# Patient Record
Sex: MALE | Race: ASIAN | Employment: UNEMPLOYED | URBAN - METROPOLITAN AREA
[De-identification: names, ages, dates, MRNs, and addresses within clinical notes are randomized per-mention and may not be internally consistent; named-entity substitution may affect disease eponyms.]

---

## 2017-01-27 RX ORDER — CHOLECALCIFEROL (VITAMIN D3) 125 MCG
CAPSULE ORAL
Qty: 100 CAP | Refills: 0 | Status: SHIPPED | OUTPATIENT
Start: 2017-01-27 | End: 2017-02-28 | Stop reason: SDUPTHER

## 2017-02-28 DIAGNOSIS — J06.9 VIRAL URI: ICD-10-CM

## 2017-02-28 RX ORDER — MONTELUKAST SODIUM 10 MG/1
TABLET ORAL
Qty: 30 TAB | Refills: 1 | Status: SHIPPED | OUTPATIENT
Start: 2017-02-28 | End: 2017-05-10 | Stop reason: ALTCHOICE

## 2017-02-28 RX ORDER — CHOLECALCIFEROL (VITAMIN D3) 125 MCG
CAPSULE ORAL
Qty: 100 CAP | Refills: 0 | Status: SHIPPED | OUTPATIENT
Start: 2017-02-28 | End: 2017-05-25 | Stop reason: SDUPTHER

## 2017-05-10 ENCOUNTER — OFFICE VISIT (OUTPATIENT)
Dept: FAMILY MEDICINE CLINIC | Age: 67
End: 2017-05-10

## 2017-05-10 VITALS
DIASTOLIC BLOOD PRESSURE: 88 MMHG | WEIGHT: 122 LBS | HEIGHT: 64 IN | HEART RATE: 64 BPM | TEMPERATURE: 97.6 F | RESPIRATION RATE: 14 BRPM | OXYGEN SATURATION: 100 % | SYSTOLIC BLOOD PRESSURE: 136 MMHG | BODY MASS INDEX: 20.83 KG/M2

## 2017-05-10 DIAGNOSIS — R27.8 SENSORY ATAXIA: ICD-10-CM

## 2017-05-10 DIAGNOSIS — E55.9 VITAMIN D DEFICIENCY: ICD-10-CM

## 2017-05-10 DIAGNOSIS — Z23 NEED FOR PNEUMOCOCCAL VACCINATION: ICD-10-CM

## 2017-05-10 DIAGNOSIS — E78.1 HYPERTRIGLYCERIDEMIA: ICD-10-CM

## 2017-05-10 DIAGNOSIS — I10 ESSENTIAL HYPERTENSION: Primary | ICD-10-CM

## 2017-05-10 DIAGNOSIS — E78.5 HYPERLIPIDEMIA WITH TARGET LDL LESS THAN 130: ICD-10-CM

## 2017-05-10 NOTE — MR AVS SNAPSHOT
Visit Information Date & Time Provider Department Dept. Phone Encounter #  
 5/10/2017  8:00 AM Derrick Connor MD [address] [place] [address] [phone] [id number] Follow-up Instructions Return in about 4 months (around 9/10/2017) for fasting, physical.  
  
Upcoming Health Maintenance Date Due ZOSTER VACCINE AGE 60> 1/1/2012 GLAUCOMA SCREENING Q2Y 1/1/2017 Pneumococcal 65+ Low/Medium Risk (1 of 2 - PCV13) 1/1/2017 MEDICARE YEARLY EXAM 1/1/2017 INFLUENZA AGE 9 TO ADULT 8/1/2017 COLONOSCOPY 1/21/2018 DTaP/Tdap/Td series (2 - Td) 6/27/2024 Allergies as of 5/10/2017  Review Complete On: 5/10/2017 By: Derrick Connor MD  
 No Known Allergies Current Immunizations  Reviewed on 8/4/2016 Name Date Influenza Vaccine 12/10/2015 Influenza Vaccine (Quad) PF 12/10/2015 Pneumococcal Polysaccharide (PPSV-23)  Incomplete Td 6/27/2014, 7/2/2012 Tdap 6/13/2013 Not reviewed this visit You Were Diagnosed With   
  
 Codes Comments Essential hypertension    -  Primary ICD-10-CM: I10 
ICD-9-CM: 401.9 Hyperlipidemia with target LDL less than 130     ICD-10-CM: E78.5 ICD-9-CM: 272.4 Hypertriglyceridemia     ICD-10-CM: E78.1 ICD-9-CM: 272.1 Vitamin D deficiency     ICD-10-CM: E55.9 ICD-9-CM: 268.9 Sensory ataxia     ICD-10-CM: R27.8 ICD-9-CM: 781.3 Need for pneumococcal vaccination     ICD-10-CM: A13 ICD-9-CM: V03.82 Vitals BP Pulse Temp Resp Height(growth percentile) Weight(growth percentile) 136/88 (BP 1 Location: Right arm, BP Patient Position: Sitting) 64 97.6 °F (36.4 °C) (Oral) 14 5' 4\" (1.626 m) 122 lb (55.3 kg) SpO2 BMI Smoking Status 100% 20.94 kg/m2 Never Smoker Vitals History BMI and BSA Data Body Mass Index Body Surface Area  
 20.94 kg/m 2 1.58 m 2 Preferred Pharmacy Pharmacy Name Phone [facility]/PHARMACY #[facility] ROSE, [address] [facility] [phone] Your Updated Medication List  
  
   
This list is accurate as of: 5/10/17  8:35 AM.  Always use your most recent med list.  
  
  
  
  
 atenolol 25 mg tablet Commonly known as:  TENORMIN  
TAKE 1 TABLET BY MOUTH DAILY. atorvastatin 10 mg tablet Commonly known as:  LIPITOR  
TAKE 1 TABLET BY MOUTH EVERY DAY  
  
 cholecalciferol 5,000 unit capsule Commonly known as:  VITAMIN D3  
TAKE ONE CAPSULE BY MOUTH EVERY DAY  
  
 cromolyn 4 % ophthalmic solution Commonly known as:  OPTICROM Administer 1 Drop to both eyes four (4) times daily. Use in affected eye(s)  
  
 cyanocobalamin 500 mcg tablet Commonly known as:  VITAMIN B12  
TAKE 1 TAB BY MOUTH DAILY. naproxen 375 mg tablet Commonly known as:  NAPROSYN Take 1 Tab by mouth two (2) times daily (with meals). We Performed the Following LIPID PANEL [58346 CPT(R)] METABOLIC PANEL, COMPREHENSIVE [64213 CPT(R)] PNEUMOCOCCAL POLYSACCHARIDE VACCINE, 23-VALENT, ADULT OR IMMUNOSUPPRESSED PT DOSE, [92920 CPT(R)] SD IMMUNIZ ADMIN,1 SINGLE/COMB VAC/TOXOID [id number] CPT(R)] VITAMIN B12 & FOLATE [97069 CPT(R)] VITAMIN D, 25 HYDROXY [id number] CPT(R)] Follow-up Instructions Return in about 4 months (around 9/10/2017) for fasting, physical.  
  
  
Patient Instructions DASH Diet: Care Instructions Your Care Instructions The DASH diet is an eating plan that can help lower your blood pressure. DASH stands for Dietary Approaches to Stop Hypertension. Hypertension is high blood pressure. The DASH diet focuses on eating foods that are high in calcium, potassium, and magnesium. These nutrients can lower blood pressure. The foods that are highest in these nutrients are fruits, vegetables, low-fat dairy products, nuts, seeds, and legumes.  But taking calcium, potassium, and magnesium supplements instead of eating foods that are high in those nutrients does not have the same effect. The DASH diet also includes whole grains, fish, and poultry. The DASH diet is one of several lifestyle changes your doctor may recommend to lower your high blood pressure. Your doctor may also want you to decrease the amount of sodium in your diet. Lowering sodium while following the DASH diet can lower blood pressure even further than just the DASH diet alone. Follow-up care is a key part of your treatment and safety. Be sure to make and go to all appointments, and call your doctor if you are having problems. It's also a good idea to know your test results and keep a list of the medicines you take. How can you care for yourself at home? Following the DASH diet · Eat 4 to 5 servings of fruit each day. A serving is 1 medium-sized piece of fruit, ½ cup chopped or canned fruit, 1/4 cup dried fruit, or 4 ounces (½ cup) of fruit juice. Choose fruit more often than fruit juice. · Eat 4 to 5 servings of vegetables each day. A serving is 1 cup of lettuce or raw leafy vegetables, ½ cup of chopped or cooked vegetables, or 4 ounces (½ cup) of vegetable juice. Choose vegetables more often than vegetable juice. · Get 2 to 3 servings of low-fat and fat-free dairy each day. A serving is 8 ounces of milk, 1 cup of yogurt, or 1 ½ ounces of cheese. · Eat 6 to 8 servings of grains each day. A serving is 1 slice of bread, 1 ounce of dry cereal, or ½ cup of cooked rice, pasta, or cooked cereal. Try to choose whole-grain products as much as possible. · Limit lean meat, poultry, and fish to 2 servings each day. A serving is 3 ounces, about the size of a deck of cards. · Eat 4 to 5 servings of nuts, seeds, and legumes (cooked dried beans, lentils, and split peas) each week. A serving is 1/3 cup of nuts, 2 tablespoons of seeds, or ½ cup of cooked beans or peas. · Limit fats and oils to 2 to 3 servings each day. A serving is 1 teaspoon of vegetable oil or 2 tablespoons of salad dressing. · Limit sweets and added sugars to 5 servings or less a week. A serving is 1 tablespoon jelly or jam, ½ cup sorbet, or 1 cup of lemonade. · Eat less than 2,300 milligrams (mg) of sodium a day. If you limit your sodium to 1,500 mg a day, you can lower your blood pressure even more. Tips for success · Start small. Do not try to make dramatic changes to your diet all at once. You might feel that you are missing out on your favorite foods and then be more likely to not follow the plan. Make small changes, and stick with them. Once those changes become habit, add a few more changes. · Try some of the following: ¨ Make it a goal to eat a fruit or vegetable at every meal and at snacks. This will make it easy to get the recommended amount of fruits and vegetables each day. ¨ Try yogurt topped with fruit and nuts for a snack or healthy dessert. ¨ Add lettuce, tomato, cucumber, and onion to sandwiches. ¨ Combine a ready-made pizza crust with low-fat mozzarella cheese and lots of vegetable toppings. Try using tomatoes, squash, spinach, broccoli, carrots, cauliflower, and onions. ¨ Have a variety of cut-up vegetables with a low-fat dip as an appetizer instead of chips and dip. ¨ Sprinkle sunflower seeds or chopped almonds over salads. Or try adding chopped walnuts or almonds to cooked vegetables. ¨ Try some vegetarian meals using beans and peas. Add garbanzo or kidney beans to salads. Make burritos and tacos with mashed knapp beans or black beans. Where can you learn more? Go to http://bri-riley.info/. Enter C119 in the search box to learn more about \"DASH Diet: Care Instructions. \" Current as of: March 23, 2016 Content Version: 11.2 © 2797-4234 [employer], [employer].  Care instructions adapted under license by [address] (which disclaims liability or warranty for this information). If you have questions about a medical condition or this instruction, always ask your healthcare professional. [address] any warranty or liability for your use of this information. Introducing \[facility]\"" & HEALTH SERVICES! Staci Mulligan introduces [employer]iCure patient portal. Now you can access parts of your medical record, email your doctor's office, and request medication refills online. 1. In your internet browser, go to [email or web address] 2. Click on the First Time User? Click Here link in the Sign In box. You will see the New Member Sign Up page. 3. Enter your CartiCure Access Code exactly as it appears below. You will not need to use this code after youve completed the sign-up process. If you do not sign up before the expiration date, you must request a new code. · [employer] Access Code: [email or web address] Expires: 8/8/2017  8:35 AM 
 
4. Enter the last four digits of your Social Security Number (xxxx) and Date of Birth (mm/dd/yyyy) as indicated and click Submit. You will be taken to the next sign-up page. 5. Create a CartiCure ID. This will be your CartiCure login ID and cannot be changed, so think of one that is secure and easy to remember. 6. Create a Carti[employer] password. You can change your password at any time. 7. Enter your Password Reset Question and Answer. This can be used at a later time if you forget your password. 8. Enter your e-mail address. You will receive e-mail notification when new information is available in [address] 9. Click Sign Up. You can now view and download portions of your medical record. 10. Click the Download Summary menu link to download a portable copy of your medical information. If you have questions, please visit the Frequently Asked Questions section of the [employer] website.  Remember, CartiCure is NOT to be used for urgent needs. For medical emergencies, dial 911. Now available from your iPhone and Android! Please provide this summary of care documentation to your next provider. Your primary care clinician is listed as Lonnie Coleman. If you have any questions after today's visit, please call [phone].

## 2017-05-10 NOTE — PROGRESS NOTES
Chief Complaint   Patient presents with    Cholesterol Problem     Follow up, fasting     Hypertension    Immunization/Injection     Pneumovax 23       1. Have you been to the ER, urgent care clinic since your last visit? Hospitalized since your last visit? No    2. Have you seen or consulted any other health care providers outside of the [address] [place] [address] since your last visit? Include any pap smears or colon screening. No    Angelica QUINONES Gabriel Ohara  is a 74 y.o.  male  who present for Pneumovax 23 immunizations/injections. He/she denies any symptoms , reactions or allergies that would exclude them from being immunized today. Risks and adverse reactions were discussed and the VIS was given if applicable to them. All questions were addressed. He/She was observed for 10 min post injection. There were no reactions observed.     Adelina Alvarez LPN

## 2017-05-10 NOTE — PATIENT INSTRUCTIONS

## 2017-05-10 NOTE — PROGRESS NOTES
HISTORY OF PRESENT ILLNESS  Angelica Btaes is a 74 y.o. male. He was seen for 4 months follow up on HTN, dyslipidemia, sensory ataxia. Significant improvement in gait. HPI  Cardiovascular Review  The patient has hypertension and hyperlipidemia. He reports taking medications as instructed, no medication side effects noted, no TIA's, no chest pain on exertion, no dyspnea on exertion, no swelling of ankles, no orthostatic dizziness or lightheadedness, no orthopnea or paroxysmal nocturnal dyspnea, no palpitations, no intermittent claudication symptoms, no muscle aches or pain. Diet and Lifestyle: not attempting to follow a low fat, low cholesterol diet, not attempting to follow a low sodium diet, sedentary, alcohol intake 3-4 beers/day. Lab review: labs reviewed and discussed with patient. 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 He had MRI of head, Cervical spine and lumbar spine. 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 He was also referred for PT. He has negative RPR, normal homocystine and methylmalonic acid levels. He  had EMG and NCV which showed sensory neuropathy in both LE. Lab Results   Component Value Date/Time    VITAMIN D, 25-HYDROXY 56.2 12/06/2016 09:52 AM         Review of Systems   Constitutional: Negative for chills, fever and malaise/fatigue. HENT: Negative for congestion, ear pain, sore throat and tinnitus. Eyes: Negative for blurred vision, double vision, pain and discharge. Respiratory: Negative for cough, shortness of breath and wheezing.     Cardiovascular: Negative for chest pain, palpitations and leg swelling. Gastrointestinal: Negative for abdominal pain, blood in stool, constipation, diarrhea, nausea and vomiting. Genitourinary: Negative for dysuria, frequency, hematuria and urgency. Musculoskeletal: Negative for back pain, joint pain and myalgias. Skin: Negative for rash. Neurological: Negative for dizziness, tremors, seizures and headaches. Endo/Heme/Allergies: Negative for polydipsia. Does not bruise/bleed easily. Psychiatric/Behavioral: Negative for depression and substance abuse. The patient is not nervous/anxious. Physical Exam   Constitutional: He is oriented to person, place, and time. He appears well-developed and well-nourished. HENT:   Head: Normocephalic and atraumatic. Nose: Nose normal.   Mouth/Throat: Oropharynx is clear and moist.   Neck: Normal range of motion. Neck supple. No JVD present. Cardiovascular: Normal rate, regular rhythm, normal heart sounds and intact distal pulses. Exam reveals no gallop and no friction rub. No murmur heard. Pulmonary/Chest: Effort normal and breath sounds normal. He has no wheezes. Abdominal: Soft. Bowel sounds are normal.   Musculoskeletal: Normal range of motion. He exhibits no edema or tenderness. Neurological: He is alert and oriented to person, place, and time. He displays abnormal reflex. No cranial nerve deficit. Coordination normal.   Reflex Scores:       Tricep reflexes are 2+ on the right side and 2+ on the left side. Bicep reflexes are 2+ on the right side and 2+ on the left side. Brachioradialis reflexes are 2+ on the right side and 2+ on the left side. Patellar reflexes are 3+ on the right side and 3+ on the left side. Achilles reflexes are 3+ on the right side and 3+ on the left side. Decreased sensations on both lower extremities. Exaggerated knee reflex on left side. He is using cane to support when he walks. Gait has been significantly improved consistently. Skin: Skin is warm and dry. No rash noted. Psychiatric: He has a normal mood and affect. Nursing note and vitals reviewed. ASSESSMENT and PLAN  Mon was seen today for cholesterol problem, hypertension and immunization/injection. Diagnoses and all orders for this visit:    Essential hypertension  -     METABOLIC PANEL, COMPREHENSIVE  -     LIPID PANEL    Hyperlipidemia with target LDL less than 144  -     METABOLIC PANEL, COMPREHENSIVE  -     LIPID PANEL    Hypertriglyceridemia  -     METABOLIC PANEL, COMPREHENSIVE  -     LIPID PANEL    Vitamin D deficiency  -     VITAMIN D, 25 HYDROXY    Sensory ataxia  -     VITAMIN B12 & FOLATE    Need for pneumococcal vaccination  -     PNEUMOCOCCAL POLYSACCHARIDE VACCINE, 23-VALENT, ADULT OR IMMUNOSUPPRESSED PT DOSE,  -     [place] IMMUNIZ ADMIN,1 SINGLE/COMB VAC/TOXOID    Discussed lifestyle issues and health guidance given  Patient was given an after visit summary which includes diagnoses, vital signs, current medications, instructions and references & authorized prescriptions . Results of labs will be conveyed to patient, once available. Pt verbalized instructions I provided and expressed understanding of discussion that was held today.   Follow-up Disposition:  Return in about 4 months (around 9/10/2017) for fasting, physical.

## 2017-05-11 LAB
25(OH)D3+25(OH)D2 SERPL-MCNC: 66.7 NG/ML (ref 30–100)
ALBUMIN SERPL-MCNC: 4.5 G/DL (ref 3.6–4.8)
ALBUMIN/GLOB SERPL: 1.6 {RATIO} (ref 1.2–2.2)
ALP SERPL-CCNC: 74 IU/L (ref 39–117)
ALT SERPL-CCNC: 34 IU/L (ref 0–44)
AST SERPL-CCNC: 28 IU/L (ref 0–40)
BILIRUB SERPL-MCNC: 1.1 MG/DL (ref 0–1.2)
BUN SERPL-MCNC: 6 MG/DL (ref 8–27)
BUN/CREAT SERPL: 6 (ref 10–24)
CALCIUM SERPL-MCNC: 9.6 MG/DL (ref 8.6–10.2)
CHLORIDE SERPL-SCNC: 101 MMOL/L (ref 96–106)
CHOLEST SERPL-MCNC: 119 MG/DL (ref 100–199)
CO2 SERPL-SCNC: 24 MMOL/L (ref 18–29)
CREAT SERPL-MCNC: 0.98 MG/DL (ref 0.76–1.27)
FOLATE SERPL-MCNC: 9 NG/ML
GLOBULIN SER CALC-MCNC: 2.8 G/DL (ref 1.5–4.5)
GLUCOSE SERPL-MCNC: 94 MG/DL (ref 65–99)
HDLC SERPL-MCNC: 40 MG/DL
INTERPRETATION, 910389: NORMAL
LDLC SERPL CALC-MCNC: 25 MG/DL (ref 0–99)
POTASSIUM SERPL-SCNC: 4.2 MMOL/L (ref 3.5–5.2)
PROT SERPL-MCNC: 7.3 G/DL (ref 6–8.5)
SODIUM SERPL-SCNC: 141 MMOL/L (ref 134–144)
TRIGL SERPL-MCNC: 271 MG/DL (ref 0–149)
VIT B12 SERPL-MCNC: 607 PG/ML (ref 211–946)
VLDLC SERPL CALC-MCNC: 54 MG/DL (ref 5–40)

## 2017-05-18 ENCOUNTER — HOSPITAL ENCOUNTER (EMERGENCY)
Age: 67
Discharge: HOME | End: 2017-05-18
Attending: EMERGENCY MEDICINE
Payer: MEDICAID

## 2017-05-18 ENCOUNTER — APPOINTMENT (OUTPATIENT)
Dept: CT IMAGING | Age: 67
End: 2017-05-18
Attending: EMERGENCY MEDICINE
Payer: MEDICAID

## 2017-05-18 VITALS
SYSTOLIC BLOOD PRESSURE: 153 MMHG | RESPIRATION RATE: 22 BRPM | HEIGHT: 64 IN | BODY MASS INDEX: 19.72 KG/M2 | DIASTOLIC BLOOD PRESSURE: 76 MMHG | HEART RATE: 64 BPM | WEIGHT: 115.5 LBS | OXYGEN SATURATION: 99 %

## 2017-05-18 DIAGNOSIS — F10.920 ALCOHOL INTOXICATION, UNCOMPLICATED (HCC): ICD-10-CM

## 2017-05-18 DIAGNOSIS — S20.211A CONTUSION OF CHEST WALL, RIGHT, INITIAL ENCOUNTER: ICD-10-CM
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
## 2017-05-18 LAB
ALBUMIN SERPL BCP-MCNC: 3.9 G/DL (ref 3.5–5)
ALBUMIN/GLOB SERPL: 0.9 {RATIO} (ref 1.1–2.2)
ALP SERPL-CCNC: 79 U/L (ref 45–117)
ALT SERPL-CCNC: 49 U/L (ref 12–78)
ANION GAP BLD CALC-SCNC: 10 MMOL/L (ref 5–15)
APTT PPP: 27.3 SEC (ref 22.1–32.5)
AST SERPL W P-5'-P-CCNC: 41 U/L (ref 15–37)
ATRIAL RATE: 54 BPM
BASOPHILS # BLD AUTO: 0 K/UL (ref 0–0.1)
BASOPHILS # BLD: 0 % (ref 0–1)
BILIRUB SERPL-MCNC: 0.4 MG/DL (ref 0.2–1)
BUN SERPL-MCNC: 10 MG/DL (ref 6–20)
BUN/CREAT SERPL: 9 (ref 12–20)
CALCIUM SERPL-MCNC: 9.9 MG/DL (ref 8.5–10.1)
CALCULATED P AXIS, ECG09: 61 DEGREES
CALCULATED R AXIS, ECG10: 42 DEGREES
CALCULATED T AXIS, ECG11: 18 DEGREES
CHLORIDE SERPL-SCNC: 107 MMOL/L (ref 97–108)
CO2 SERPL-SCNC: 26 MMOL/L (ref 21–32)
CREAT SERPL-MCNC: 1.09 MG/DL (ref 0.7–1.3)
DIAGNOSIS, 93000: NORMAL
EOSINOPHIL # BLD: 0.1 K/UL (ref 0–0.4)
EOSINOPHIL NFR BLD: 3 % (ref 0–7)
ERYTHROCYTE [DISTWIDTH] IN BLOOD BY AUTOMATED COUNT: 13.1 % (ref 11.5–14.5)
ETHANOL SERPL-MCNC: 218 MG/DL
GLOBULIN SER CALC-MCNC: 4.3 G/DL (ref 2–4)
GLUCOSE SERPL-MCNC: 112 MG/DL (ref 65–100)
HCT VFR BLD AUTO: 43 % (ref 36.6–50.3)
HGB BLD-MCNC: 15 G/DL (ref 12.1–17)
INR PPP: 0.9 (ref 0.9–1.1)
LYMPHOCYTES # BLD AUTO: 46 % (ref 12–49)
LYMPHOCYTES # BLD: 2.1 K/UL (ref 0.8–3.5)
MCH RBC QN AUTO: 29.6 PG (ref 26–34)
MCHC RBC AUTO-ENTMCNC: 34.9 G/DL (ref 30–36.5)
MCV RBC AUTO: 85 FL (ref 80–99)
MONOCYTES # BLD: 0.3 K/UL (ref 0–1)
MONOCYTES NFR BLD AUTO: 7 % (ref 5–13)
NEUTS SEG # BLD: 2 K/UL (ref 1.8–8)
NEUTS SEG NFR BLD AUTO: 44 % (ref 32–75)
P-R INTERVAL, ECG05: 154 MS
PLATELET # BLD AUTO: 170 K/UL (ref 150–400)
POTASSIUM SERPL-SCNC: 3.3 MMOL/L (ref 3.5–5.1)
PROT SERPL-MCNC: 8.2 G/DL (ref 6.4–8.2)
PROTHROMBIN TIME: 9.6 SEC (ref 9–11.1)
Q-T INTERVAL, ECG07: 414 MS
QRS DURATION, ECG06: 90 MS
QTC CALCULATION (BEZET), ECG08: 392 MS
RBC # BLD AUTO: 5.06 M/UL (ref 4.1–5.7)
SODIUM SERPL-SCNC: 143 MMOL/L (ref 136–145)
THERAPEUTIC RANGE,PTTT: NORMAL SECS (ref 58–77)
TROPONIN I SERPL-MCNC: <0.04 NG/ML
VENTRICULAR RATE, ECG03: 54 BPM
WBC # BLD AUTO: 4.5 K/UL (ref 4.1–11.1)

## 2017-05-18 PROCEDURE — 80053 COMPREHEN METABOLIC PANEL: CPT | Performed by: EMERGENCY MEDICINE

## 2017-05-18 PROCEDURE — 80307 DRUG TEST PRSMV CHEM ANLYZR: CPT | Performed by: EMERGENCY MEDICINE

## 2017-05-18 PROCEDURE — 85610 PROTHROMBIN TIME: CPT | Performed by: EMERGENCY MEDICINE

## 2017-05-18 PROCEDURE — 71250 CT THORAX DX C-: CPT

## 2017-05-18 PROCEDURE — 96374 THER/PROPH/DIAG INJ IV PUSH: CPT

## 2017-05-18 PROCEDURE — 93005 ELECTROCARDIOGRAM TRACING: CPT

## 2017-05-18 PROCEDURE — 84484 ASSAY OF TROPONIN QUANT: CPT | Performed by: EMERGENCY MEDICINE

## 2017-05-18 PROCEDURE — 85025 COMPLETE CBC W/AUTO DIFF WBC: CPT | Performed by: EMERGENCY MEDICINE

## 2017-05-18 PROCEDURE — 70450 CT HEAD/BRAIN W/O DYE: CPT

## 2017-05-18 PROCEDURE — 99284 EMERGENCY DEPT VISIT MOD MDM: CPT

## 2017-05-18 PROCEDURE — 74011250636 HC RX REV CODE- 250/636: Performed by: EMERGENCY MEDICINE

## 2017-05-18 PROCEDURE — 85730 THROMBOPLASTIN TIME PARTIAL: CPT | Performed by: EMERGENCY MEDICINE

## 2017-05-18 PROCEDURE — 96361 HYDRATE IV INFUSION ADD-ON: CPT

## 2017-05-18 PROCEDURE — 36415 COLL VENOUS BLD VENIPUNCTURE: CPT | Performed by: EMERGENCY MEDICINE

## 2017-05-18 RX ORDER — KETOROLAC TROMETHAMINE 30 MG/ML
15 INJECTION, SOLUTION INTRAMUSCULAR; INTRAVENOUS
Status: COMPLETED | OUTPATIENT
Start: 2017-05-18 | End: 2017-05-18

## 2017-05-18 RX ADMIN — KETOROLAC TROMETHAMINE 15 MG: 30 INJECTION, SOLUTION INTRAMUSCULAR at 02:16

## 2017-05-18 RX ADMIN — SODIUM CHLORIDE 500 ML: 900 INJECTION, SOLUTION INTRAVENOUS at 02:15

## 2017-05-18 NOTE — DISCHARGE INSTRUCTIONS
Acute Alcohol Intoxication: Care Instructions  Your Care Instructions  You have had treatment to help your body rid itself of alcohol. Too much alcohol upsets the body's fluid balance. Your doctor may have given you fluids and vitamins. For some people, drinking too much alcohol is a one-time event. For others, it is an ongoing problem. In either case, it is serious. It can be life-threatening. Follow-up care is a key part of your treatment and safety. Be sure to make and go to all appointments, and call your doctor if you are having problems. It's also a good idea to know your test results and keep a list of the medicines you take. How can you care for yourself at home? · Be safe with medicines. Take your medicines exactly as prescribed. Call your doctor if you think you are having a problem with your medicine. · Your doctor may have prescribed disulfiram (Antabuse). Do not drink any alcohol while you are taking this medicine. You may have severe or even life-threatening side effects from even small amounts of alcohol. · If you were given medicine to prevent nausea, be sure to take it exactly as prescribed. · Before you take any medicine, tell your doctor if:  ¨ You have had a bad reaction to any medicines in the past.  ¨ You are taking other medicines, including over-the-counter ones, or have other health problems. ¨ You are or could be pregnant. · Be prepared to have some symptoms of withdrawal in the next few days. · Drink plenty of liquids in the next few days. · Seek help if you need it to stop drinking. Getting counseling and joining a support group can help you stay sober. Try a support group such as Alcoholics Anonymous. · Avoid alcohol when you take medicines. It can react with many medicines and cause serious problems. When should you call for help? Call 911 anytime you think you may need emergency care.  For example, call if:  · You feel confused and are seeing things that are not there.  · You are thinking about killing yourself or hurting others. · You have a seizure. · You vomit blood or what looks like coffee grounds. Call your doctor now or seek immediate medical care if:  · You have trembling, restlessness, sweating, and other withdrawal symptoms that are new or that get worse. · Your withdrawal symptoms come back after not bothering you for days or weeks. · You can't stop vomiting. Watch closely for changes in your health, and be sure to contact your doctor if:  · You need help to stop drinking. Where can you learn more? Go to http://bri-riley.info/. Enter T102 in the search box to learn more about \"Acute Alcohol Intoxication: Care Instructions. \"  Current as of: November 3, 2016  Content Version: 11.2  © 4260-7379 [employer]. Care instructions adapted under license by [employer] (which disclaims liability or warranty for this information). If you have questions about a medical condition or this instruction, always ask your healthcare professional. [place] [address] any warranty or liability for your use of this information. Chest Contusion: Care Instructions  Your Care Instructions  A chest contusion, or bruise, is caused by a fall or direct blow to the chest. Car crashes, falls, getting punched, and injury from bicycle handlebars are common causes of chest contusions. A very forceful blow to the chest can injure the heart or blood vessels in the chest, the lungs, the airway, the liver, or the spleen. Pain may be caused by an injury to muscles, cartilage, or ribs. Deep breathing, coughing, or sneezing can increase your pain. Lying on the injured area also can cause pain. Follow-up care is a key part of your treatment and safety. Be sure to make and go to all appointments, and call your doctor if you are having problems.  It's also a good idea to know your test results and keep a list of the medicines you take.  How can you care for yourself at home? · Rest and protect the injured or sore area. Stop, change, or take a break from any activity that may be causing your pain. · Put ice or a cold pack on the area for 10 to 20 minutes at a time. Put a thin cloth between the ice and your skin. · After 2 or 3 days, if your swelling is gone, apply a heating pad set on low or a warm cloth to your chest. Some doctors suggest that you go back and forth between hot and cold. Put a thin cloth between the heating pad and your skin. · Do not wrap or tape your ribs for support. This may cause you to take smaller breaths, which could increase your risk of pneumonia and lung collapse. · Ask your doctor if you can take an over-the-counter pain medicine, such as acetaminophen (Tylenol), ibuprofen (Advil, Motrin), or naproxen (Aleve). Be safe with medicines. Read and follow all instructions on the label. · Take your medicines exactly as prescribed. Call your doctor if you think you are having a problem with your medicine. · Gentle stretching and massage may help you feel better after a few days of rest. Stretch slowly to the point just before discomfort begins, then hold the stretch for at least 15 to 30 seconds. Do this 3 or 4 times per day. · As your pain gets better, slowly return to your normal activities. Be patient, because chest bruises can take weeks or months to heal. Any increased pain may be a sign that you need to rest a while longer. When should you call for help? Call 911 anytime you think you may need emergency care. For example, call if:  · You have severe trouble breathing. · You cough up blood. Call your doctor now or seek immediate medical care if:  · You have belly pain. · You are dizzy or lightheaded, or you feel like you may faint. · You develop new symptoms with the chest pain. · Your chest pain gets worse. · You have a fever. · You have some shortness of breath.   · You have a cough that brings up mucus from the lungs. Watch closely for changes in your health, and be sure to contact your doctor if:  · Your chest pain is not improving after 1 week. Where can you learn more? Go to http://bri-riley.info/. Enter I174 in the search box to learn more about \"Chest Contusion: Care Instructions. \"  Current as of: May 27, 2016  Content Version: 11.2  © 7114-8891 [employer]. Care instructions adapted under license by [employer] (which disclaims liability or warranty for this information). If you have questions about a medical condition or this instruction, always ask your healthcare professional. [place] [address] any warranty or liability for your use of this information. We hope that we have addressed all of your medical concerns. The examination and treatment you received in the Emergency Department were for an emergent problem and were not intended as complete care. It is important that you follow up with your healthcare provider(s) for ongoing care. If your symptoms worsen or do not improve as expected, and you are unable to reach your usual health care provider(s), you should return to the Emergency Department. Today's healthcare is undergoing tremendous change, and patient satisfaction surveys are one of the many tools to assess the quality of medical care. You may receive a survey from the [employer] regarding your experience in the Emergency Department. I hope that your experience has been completely positive, particularly the medical care that I provided. As such, please participate in the survey; anything less than excellent does not meet my expectations or intentions. [facility] and [facility] participate in nationally recognized quality of care measures.   If your blood pressure is greater than 120/80, as reported below, we urge that you seek medical care to address the potential of high blood pressure, commonly known as hypertension. Hypertension can be hereditary or can be caused by certain medical conditions, pain, stress, or \"white coat syndrome. \"       Please make an appointment with your health care provider(s) for follow up of your Emergency Department visit. VITALS:   Patient Vitals for the past 8 hrs:   Pulse Resp BP SpO2   05/18/17 0300 64 22 153/76 99 %   05/18/17 0154 (!) 57 16 165/84 97 %          Thank you for allowing us to provide you with medical care today. We realize that you have many choices for your emergency care needs. Please choose us in the future for any continued health care needs. Melissa Coleman MD    [facility]   Office: [phone]            Recent Results (from the past 24 hour(s))   EKG, 12 LEAD, INITIAL    Collection Time: 05/18/17  2:10 AM   Result Value Ref Range    Ventricular Rate 54 BPM    Atrial Rate 54 BPM    P-R Interval 154 ms    QRS Duration 90 ms    Q-T Interval 414 ms    QTC Calculation (Bezet) 392 ms    Calculated P Axis 61 degrees    Calculated R Axis 42 degrees    Calculated T Axis 18 degrees    Diagnosis       Sinus bradycardia  When compared with ECG of 13-FEB-2015 22:02,  Vent. rate has decreased BY  55 BPM  QT has shortened     CBC WITH AUTOMATED DIFF    Collection Time: 05/18/17  2:14 AM   Result Value Ref Range    WBC 4.5 4.1 - 11.1 K/uL    RBC 5.06 4.10 - 5.70 M/uL    HGB 15.0 12.1 - 17.0 g/dL    HCT 43.0 36.6 - 50.3 %    MCV 85.0 80.0 - 99.0 FL    MCH 29.6 26.0 - 34.0 PG    MCHC 34.9 30.0 - 36.5 g/dL    RDW 13.1 11.5 - 14.5 %    PLATELET 303 304 - 978 K/uL    NEUTROPHILS 44 32 - 75 %    LYMPHOCYTES 46 12 - 49 %    MONOCYTES 7 5 - 13 %    EOSINOPHILS 3 0 - 7 %    BASOPHILS 0 0 - 1 %    ABS. NEUTROPHILS 2.0 1.8 - 8.0 K/UL    ABS. LYMPHOCYTES 2.1 0.8 - 3.5 K/UL    ABS. MONOCYTES 0.3 0.0 - 1.0 K/UL    ABS. EOSINOPHILS 0.1 0.0 - 0.4 K/UL    ABS.  BASOPHILS 0.0 0.0 - 0.1 K/UL   METABOLIC PANEL, COMPREHENSIVE    Collection Time: 05/18/17  2:14 AM   Result Value Ref Range    Sodium 143 136 - 145 mmol/L    Potassium 3.3 (L) 3.5 - 5.1 mmol/L    Chloride 107 97 - 108 mmol/L    CO2 26 21 - 32 mmol/L    Anion gap 10 5 - 15 mmol/L    Glucose 112 (H) 65 - 100 mg/dL    BUN 10 6 - 20 MG/DL    Creatinine 1.09 0.70 - 1.30 MG/DL    BUN/Creatinine ratio 9 (L) 12 - 20      GFR est AA >60 >60 ml/min/1.73m2    GFR est non-AA >60 >60 ml/min/1.73m2    Calcium 9.9 8.5 - 10.1 MG/DL    Bilirubin, total 0.4 0.2 - 1.0 MG/DL    ALT (SGPT) 49 12 - 78 U/L    AST (SGOT) 41 (H) 15 - 37 U/L    Alk. phosphatase 79 45 - 117 U/L    Protein, total 8.2 6.4 - 8.2 g/dL    Albumin 3.9 3.5 - 5.0 g/dL    Globulin 4.3 (H) 2.0 - 4.0 g/dL    A-G Ratio 0.9 (L) 1.1 - 2.2     ETHYL ALCOHOL    Collection Time: 05/18/17  2:14 AM   Result Value Ref Range    ALCOHOL(ETHYL),SERUM 218 (H) <10 MG/DL   TROPONIN I    Collection Time: 05/18/17  2:14 AM   Result Value Ref Range    Troponin-I, Qt. <0.04 <0.05 ng/mL   PROTHROMBIN TIME + INR    Collection Time: 05/18/17  2:14 AM   Result Value Ref Range    INR 0.9 0.9 - 1.1      Prothrombin time 9.6 9.0 - 11.1 sec   PTT    Collection Time: 05/18/17  2:14 AM   Result Value Ref Range    aPTT 27.3 22.1 - 32.5 sec    aPTT, therapeutic range     58.0 - 77.0 SECS       Ct Head Wo Cont    Result Date: 5/18/2017  Indication:  Fall, head injury Comparison: None Findings: 5 mm axial images were obtained from the skull base through the vertex. CT dose reduction was achieved through the use of a standardized protocol tailored for this examination and automatic exposure control for dose modulation. The ventricles and cortical sulci are appropriate in size and configuration. There is no evidence of intracranial hemorrhage, mass, mass effect, or acute infarct. No extra-axial fluid collections are seen. The visualized paranasal sinuses and mastoid air cells are clear.  The orbital structures are unremarkable. No osseous abnormalities are seen. Impression: No acute intracranial abnormality. Ct Chest Wo Cont    Result Date: 5/18/2017  INDICATION: Right chest wall pain, fall COMPARISON: CT chest 8/14/2014 TECHNIQUE:  5 mm axial images were obtained through the chest. Coronal and sagittal reconstructions were generated. CT dose reduction was achieved through use of a standardized protocol tailored for this examination and automatic exposure control for dose modulation. The absence of intravenous contrast reduces the sensitivity for evaluation of the mediastinum and upper abdominal organs. FINDINGS: THYROID: No nodule. MEDIASTINUM: No mass or lymphadenopathy. NITIN: No mass or lymphadenopathy. THORACIC AORTA: No aneurysm. MAIN PULMONARY ARTERY: Normal in caliber. TRACHEA/BRONCHI: Patent. ESOPHAGUS: No wall thickening or dilatation. HEART: Mild cardiomegaly. Coronary artery atherosclerosis is noted. PLEURA: No effusion or pneumothorax. LUNGS: No pulmonary mass or focal airspace disease. 4 mm right middle lobe anterior pulmonary nodule image 4-33 is not significantly changed INCIDENTALLY IMAGED UPPER ABDOMEN: No focal abnormality. BONES: No destructive bone lesion. IMPRESSION: No acute thoracic pathology.

## 2017-05-18 NOTE — ED NOTES
I have reviewed discharge instructions with the patient. The patient verbalized understanding. Time allotted for questions. VSS. Pt wheeled out of unit with son.

## 2017-05-18 NOTE — ED PROVIDER NOTES
HPI Comments: 74 y.o. male with past medical history significant for HTN, stroke who presents accompanied by son with chief complaint of rib pain s/p GLF. Per son's translation, patient had an unwitnessed fall in the bathroom and hit his right ribs/flank on something earlier today. Per son, patient is unsure what he hit though he denies hitting his head. Per son, patient believes he was unconscious for an unspecified amount of time because he does not remember what happened to him. Son denies other complaints. There are no other acute medical concerns at this time. Social hx: never smoker, social alcohol drinker  PCP: Kenji Alcocer MD    Note written by Daylin Hamlin, as dictated by Jesus Alberto Maxwell MD 2:06 AM     The history is provided by a relative. A  was used. Past Medical History:   Diagnosis Date    B12 deficiency 08/08/13    level 0    Hypercholesterolemia     Hypertension     Stroke [facility])        History reviewed. No pertinent surgical history. History reviewed. No pertinent family history. Social History     Social History    Marital status:      Spouse name: N/A    Number of children: N/A    Years of education: N/A     Occupational History    Not on file. Social History Main Topics    Smoking status: Never Smoker    Smokeless tobacco: Current User     Types: Snuff    Alcohol use 0.0 oz/week     0 Standard drinks or equivalent per week      Comment: Social    Drug use: No    Sexual activity: Not on file     Other Topics Concern    Not on file     Social History Narrative         ALLERGIES: Review of patient's allergies indicates no known allergies. Review of Systems   Constitutional: Negative for diaphoresis and fever. HENT: Negative for facial swelling. Eyes: Negative for visual disturbance. Respiratory: Negative for cough. Cardiovascular: Negative for chest pain. Gastrointestinal: Negative for abdominal pain. Genitourinary: Positive for flank pain. Negative for dysuria. Musculoskeletal: Negative for joint swelling. Skin: Negative for rash. Neurological: Negative for headaches. Hematological: Negative for adenopathy. Psychiatric/Behavioral: Negative for suicidal ideas. Vitals:    05/18/17 0154   BP: 165/84   Pulse: (!) 57   Resp: 16   SpO2: 97%   Weight: 52.4 kg (115 lb 8 oz)   Height: 5' 4\" (1.626 m)            Physical Exam   Constitutional: He is oriented to person, place, and time. He appears well-developed and well-nourished. No distress. HENT:   Head: Normocephalic and atraumatic. Mouth/Throat: Oropharynx is clear and moist.   Eyes: Pupils are equal, round, and reactive to light. Neck: Normal range of motion. Neck supple. Cardiovascular: Normal rate, regular rhythm, normal heart sounds and intact distal pulses. Pulmonary/Chest: Effort normal and breath sounds normal. Tachypnea noted. No respiratory distress. Abdominal: Soft. Bowel sounds are normal. He exhibits no distension. There is no tenderness. Musculoskeletal: Normal range of motion. He exhibits no edema. Tender over right flank   Neurological: He is alert and oriented to person, place, and time. Skin: Skin is warm and dry. Nursing note and vitals reviewed. Note written by Daylin Cottrell, as dictated by Kamar Finnegan MD 2:06 AM      MDM  Number of Diagnoses or Management Options  Alcohol intoxication, uncomplicated [facility]):   Contusion of chest wall, right, initial encounter:   Fall, initial encounter:   Diagnosis management comments: A:  Pt fell in bathroom with contusion/pain to R chest wall. Denies head injury but patient isn't sure. VS stable. Exam reassuring.  + odor of alcohol noted. P:  ecg  Labs  Head ct  Ct chest    ED Course       Procedures    ED EKG interpretation:  Rhythm: normal sinus rhythm. Rate (approx.): 54. Axis: normal.  ST segment:  No concerning ST elevations or depressions.  This EKG was interpreted by Hermelindo Benson MD,ED Provider. Labs and imaging studies unremarkable. VS remain stable. Pt stable for discharge home with son.

## 2017-05-19 ENCOUNTER — PATIENT OUTREACH (OUTPATIENT)
Dept: FAMILY MEDICINE CLINIC | Age: 67
End: 2017-05-19

## 2017-05-19 NOTE — PROGRESS NOTES
NNTOC-ED    Patient listed on discharge [facility]) report dated 17. Patient discharged from [facility] for fall, chest wall contusion and ETOH incoxication. Contacted patient to perform post ED/UC discharge assessment. Verified  and address with patient's son-in-law, Hayder Che (listed on HIPPA)  as identifiers. Provided introduction to self, and explanation of the NN role. Mr. Donnie Jacob states that the patient is doing well and is taking his medication. Performed medication reconciliation with Mr. Donnie Jacob, and he verbalizes understanding of administration of home medications. Reviewed discharge instructions with Mr. Donnie Jacob, and he verbalizes understand of discharge instructions and follow-up care. Mr. Donnie Jacob requests an appointment with the PCP. This writer informed him that a message would be sent over to the PCP's nurse regarding the appointment, and he verbalized understanding. Future Appointments  Date Time Provider Trisha Tirado   2017 9:40 AM MD Carmencita Arnold   Reviewed red flags with Mr. Donnie Jacob, and he verbalizes understanding. No other clinical/social/functional needs noted. Patient given an opportunity to ask questions. Contact information provided to the patient for future reference or further questions. Red Flags:  Call 911 anytime you think you may need emergency care. For example, call if:  · You feel confused and are seeing things that are not there. · You are thinking about killing yourself or hurting others. · You have a seizure. · You vomit blood or what looks like coffee grounds. · You have severe trouble breathing. · You cough up blood. Call your doctor now or seek immediate medical care if:  · You have trembling, restlessness, sweating, and other withdrawal symptoms that are new or that get worse. · Your withdrawal symptoms come back after not bothering you for days or weeks. · You can't stop vomiting. · You have belly pain.   · You are dizzy or lightheaded, or you feel like you may faint. · You develop new symptoms with the chest pain. · Your chest pain gets worse. · You have a fever. · You have some shortness of breath. · You have a cough that brings up mucus from the lungs. Watch closely for changes in your health, and be sure to contact your doctor if:  · You need help to stop drinking. · Your chest pain is not improving after 1 week.

## 2017-05-22 ENCOUNTER — APPOINTMENT (OUTPATIENT)
Dept: CT IMAGING | Age: 67
End: 2017-05-22
Attending: EMERGENCY MEDICINE
Payer: MEDICAID

## 2017-05-22 ENCOUNTER — APPOINTMENT (OUTPATIENT)
Dept: GENERAL RADIOLOGY | Age: 67
End: 2017-05-22
Attending: EMERGENCY MEDICINE
Payer: MEDICAID

## 2017-05-22 ENCOUNTER — HOSPITAL ENCOUNTER (EMERGENCY)
Age: 67
Discharge: HOME | End: 2017-05-22
Attending: EMERGENCY MEDICINE | Admitting: EMERGENCY MEDICINE
Payer: MEDICAID

## 2017-05-22 VITALS
SYSTOLIC BLOOD PRESSURE: 141 MMHG | DIASTOLIC BLOOD PRESSURE: 69 MMHG | OXYGEN SATURATION: 98 % | TEMPERATURE: 97.8 F | HEART RATE: 51 BPM | HEIGHT: 64 IN | RESPIRATION RATE: 16 BRPM

## 2017-05-22 DIAGNOSIS — R07.89 CHEST WALL PAIN: Primary | ICD-10-CM

## 2017-05-22 LAB
ALBUMIN SERPL BCP-MCNC: 3.9 G/DL (ref 3.5–5)
ALBUMIN/GLOB SERPL: 1 {RATIO} (ref 1.1–2.2)
ALP SERPL-CCNC: 73 U/L (ref 45–117)
ALT SERPL-CCNC: 31 U/L (ref 12–78)
ANION GAP BLD CALC-SCNC: 10 MMOL/L (ref 5–15)
ARTERIAL PATENCY WRIST A: ABNORMAL
AST SERPL W P-5'-P-CCNC: 20 U/L (ref 15–37)
ATRIAL RATE: 63 BPM
BASE DEFICIT BLDV-SCNC: 2 MMOL/L
BASOPHILS # BLD AUTO: 0 K/UL (ref 0–0.1)
BASOPHILS # BLD: 0 % (ref 0–1)
BDY SITE: ABNORMAL
BILIRUB SERPL-MCNC: 0.9 MG/DL (ref 0.2–1)
BUN SERPL-MCNC: 16 MG/DL (ref 6–20)
BUN/CREAT SERPL: 13 (ref 12–20)
CALCIUM SERPL-MCNC: 10 MG/DL (ref 8.5–10.1)
CALCULATED P AXIS, ECG09: 53 DEGREES
CALCULATED R AXIS, ECG10: 31 DEGREES
CALCULATED T AXIS, ECG11: 33 DEGREES
CHLORIDE SERPL-SCNC: 105 MMOL/L (ref 97–108)
CK MB CFR SERPL CALC: 1.6 % (ref 0–2.5)
CK MB SERPL-MCNC: 1.1 NG/ML (ref 5–25)
CK SERPL-CCNC: 68 U/L (ref 39–308)
CO2 SERPL-SCNC: 24 MMOL/L (ref 21–32)
CREAT SERPL-MCNC: 1.28 MG/DL (ref 0.7–1.3)
DIAGNOSIS, 93000: NORMAL
EOSINOPHIL # BLD: 0.1 K/UL (ref 0–0.4)
EOSINOPHIL NFR BLD: 2 % (ref 0–7)
ERYTHROCYTE [DISTWIDTH] IN BLOOD BY AUTOMATED COUNT: 12.9 % (ref 11.5–14.5)
ETHANOL SERPL-MCNC: <10 MG/DL
GAS FLOW.O2 O2 DELIVERY SYS: ABNORMAL L/MIN
GLOBULIN SER CALC-MCNC: 3.9 G/DL (ref 2–4)
GLUCOSE SERPL-MCNC: 189 MG/DL (ref 65–100)
HCO3 BLDV-SCNC: 22.1 MMOL/L (ref 23–28)
HCT VFR BLD AUTO: 41.9 % (ref 36.6–50.3)
HGB BLD-MCNC: 14.8 G/DL (ref 12.1–17)
LYMPHOCYTES # BLD AUTO: 28 % (ref 12–49)
LYMPHOCYTES # BLD: 1.8 K/UL (ref 0.8–3.5)
MCH RBC QN AUTO: 29.8 PG (ref 26–34)
MCHC RBC AUTO-ENTMCNC: 35.3 G/DL (ref 30–36.5)
MCV RBC AUTO: 84.5 FL (ref 80–99)
MONOCYTES # BLD: 0.4 K/UL (ref 0–1)
MONOCYTES NFR BLD AUTO: 6 % (ref 5–13)
NEUTS SEG # BLD: 4.1 K/UL (ref 1.8–8)
NEUTS SEG NFR BLD AUTO: 64 % (ref 32–75)
O2/TOTAL GAS SETTING VFR VENT: 21 %
P-R INTERVAL, ECG05: 140 MS
PCO2 BLDV: 30.3 MMHG (ref 41–51)
PH BLDV: 7.47 [PH] (ref 7.32–7.42)
PLATELET # BLD AUTO: 194 K/UL (ref 150–400)
PO2 BLDV: 34 MMHG (ref 25–40)
POTASSIUM SERPL-SCNC: 4 MMOL/L (ref 3.5–5.1)
PROT SERPL-MCNC: 7.8 G/DL (ref 6.4–8.2)
Q-T INTERVAL, ECG07: 400 MS
QRS DURATION, ECG06: 86 MS
QTC CALCULATION (BEZET), ECG08: 409 MS
RBC # BLD AUTO: 4.96 M/UL (ref 4.1–5.7)
SALICYLATES SERPL-MCNC: <1.7 MG/DL (ref 2.8–20)
SAO2 % BLDV: 70 % (ref 65–88)
SODIUM SERPL-SCNC: 139 MMOL/L (ref 136–145)
SPECIMEN TYPE: ABNORMAL
TOTAL RESP. RATE, ITRR: 28
TROPONIN I SERPL-MCNC: <0.04 NG/ML
VENTRICULAR RATE, ECG03: 63 BPM
WBC # BLD AUTO: 6.4 K/UL (ref 4.1–11.1)

## 2017-05-22 PROCEDURE — 93005 ELECTROCARDIOGRAM TRACING: CPT

## 2017-05-22 PROCEDURE — 82803 BLOOD GASES ANY COMBINATION: CPT

## 2017-05-22 PROCEDURE — 84484 ASSAY OF TROPONIN QUANT: CPT | Performed by: EMERGENCY MEDICINE

## 2017-05-22 PROCEDURE — 80053 COMPREHEN METABOLIC PANEL: CPT | Performed by: EMERGENCY MEDICINE

## 2017-05-22 PROCEDURE — 74011636320 HC RX REV CODE- 636/320: Performed by: EMERGENCY MEDICINE

## 2017-05-22 PROCEDURE — 99284 EMERGENCY DEPT VISIT MOD MDM: CPT

## 2017-05-22 PROCEDURE — 85025 COMPLETE CBC W/AUTO DIFF WBC: CPT | Performed by: EMERGENCY MEDICINE

## 2017-05-22 PROCEDURE — 71010 XR CHEST PORT: CPT

## 2017-05-22 PROCEDURE — 71275 CT ANGIOGRAPHY CHEST: CPT

## 2017-05-22 PROCEDURE — 96374 THER/PROPH/DIAG INJ IV PUSH: CPT

## 2017-05-22 PROCEDURE — 80307 DRUG TEST PRSMV CHEM ANLYZR: CPT | Performed by: EMERGENCY MEDICINE

## 2017-05-22 PROCEDURE — 74011000258 HC RX REV CODE- 258: Performed by: EMERGENCY MEDICINE

## 2017-05-22 PROCEDURE — 74011250636 HC RX REV CODE- 250/636: Performed by: EMERGENCY MEDICINE

## 2017-05-22 PROCEDURE — 36415 COLL VENOUS BLD VENIPUNCTURE: CPT | Performed by: EMERGENCY MEDICINE

## 2017-05-22 PROCEDURE — 82550 ASSAY OF CK (CPK): CPT | Performed by: EMERGENCY MEDICINE

## 2017-05-22 RX ORDER — MORPHINE SULFATE 4 MG/ML
4 INJECTION, SOLUTION INTRAMUSCULAR; INTRAVENOUS
Status: COMPLETED | OUTPATIENT
Start: 2017-05-22 | End: 2017-05-22

## 2017-05-22 RX ORDER — SODIUM CHLORIDE 0.9 % (FLUSH) 0.9 %
10 SYRINGE (ML) INJECTION
Status: COMPLETED | OUTPATIENT
Start: 2017-05-22 | End: 2017-05-22

## 2017-05-22 RX ADMIN — SODIUM CHLORIDE 1000 ML: 900 INJECTION, SOLUTION INTRAVENOUS at 14:33

## 2017-05-22 RX ADMIN — Medication 4 MG: at 14:34

## 2017-05-22 RX ADMIN — IOPAMIDOL 80 ML: 755 INJECTION, SOLUTION INTRAVENOUS at 15:27

## 2017-05-22 RX ADMIN — SODIUM CHLORIDE 100 ML: 900 INJECTION, SOLUTION INTRAVENOUS at 15:27

## 2017-05-22 RX ADMIN — Medication 10 ML: at 15:27

## 2017-05-22 NOTE — DISCHARGE INSTRUCTIONS
Chest Pain: Care Instructions  Your Care Instructions  There are many things that can cause chest pain. Some are not serious and will get better on their own in a few days. But some kinds of chest pain need more testing and treatment. Your doctor may have recommended a follow-up visit in the next 8 to 12 hours. If you are not getting better, you may need more tests or treatment. Even though your doctor has released you, you still need to watch for any problems. The doctor carefully checked you, but sometimes problems can develop later. If you have new symptoms or if your symptoms do not get better, get medical care right away. If you have worse or different chest pain or pressure that lasts more than 5 minutes or you passed out (lost consciousness), call 911 or seek other emergency help right away. A medical visit is only one step in your treatment. Even if you feel better, you still need to do what your doctor recommends, such as going to all suggested follow-up appointments and taking medicines exactly as directed. This will help you recover and help prevent future problems. How can you care for yourself at home? · Rest until you feel better. · Take your medicine exactly as prescribed. Call your doctor if you think you are having a problem with your medicine. · Do not drive after taking a prescription pain medicine. When should you call for help? Call 911 if:  · You passed out (lost consciousness). · You have severe difficulty breathing. · You have symptoms of a heart attack. These may include:  ¨ Chest pain or pressure, or a strange feeling in your chest.  ¨ Sweating. ¨ Shortness of breath. ¨ Nausea or vomiting. ¨ Pain, pressure, or a strange feeling in your back, neck, jaw, or upper belly or in one or both shoulders or arms. ¨ Lightheadedness or sudden weakness. ¨ A fast or irregular heartbeat.   After you call 911, the  may tell you to chew 1 adult-strength or 2 to 4 low-dose aspirin. Wait for an ambulance. Do not try to drive yourself. Call your doctor today if:  · You have any trouble breathing. · Your chest pain gets worse. · You are dizzy or lightheaded, or you feel like you may faint. · You are not getting better as expected. · You are having new or different chest pain. Where can you learn more? Go to http://bri-riley.info/. Enter A120 in the search box to learn more about \"Chest Pain: Care Instructions. \"  Current as of: May 27, 2016  Content Version: 11.2  © 3965-7230 [employer]. Care instructions adapted under license by [employer] (which disclaims liability or warranty for this information). If you have questions about a medical condition or this instruction, always ask your healthcare professional. [address] any warranty or liability for your use of this information. We hope that we have addressed all of your medical concerns. The examination and treatment you received in the Emergency Department were for an emergent problem and were not intended as complete care. It is important that you follow up with your healthcare provider(s) for ongoing care. If your symptoms worsen or do not improve as expected, and you are unable to reach your usual health care provider(s), you should return to the Emergency Department. Today's healthcare is undergoing tremendous change, and patient satisfaction surveys are one of the many tools to assess the quality of medical care. You may receive a survey from the [employer] organization regarding your experience in the Emergency Department. I hope that your experience has been completely positive, particularly the medical care that I provided. As such, please participate in the survey; anything less than excellent does not meet my expectations or intentions.         [facility] and [facility] participate in nationally recognized quality of care measures. If your blood pressure is greater than 120/80, as reported below, we urge that you seek medical care to address the potential of high blood pressure, commonly known as hypertension. Hypertension can be hereditary or can be caused by certain medical conditions, pain, stress, or \"white coat syndrome. \"       Please make an appointment with your health care provider(s) for follow up of your Emergency Department visit. VITALS:   Patient Vitals for the past 8 hrs:   Temp Pulse Resp BP SpO2   05/22/17 1540 - (!) 54 20 150/77 99 %   05/22/17 1423 97.4 °F (36.3 °C) 64 (!) 32 148/79 100 %          Thank you for allowing us to provide you with medical care today. We realize that you have many choices for your emergency care needs. Please choose us in the future for any continued health care needs.       Sherita Marcial MD    [facility], [facility].   Office: [phone]            Recent Results (from the past 24 hour(s))   EKG, 12 LEAD, INITIAL    Collection Time: 05/22/17  2:21 PM   Result Value Ref Range    Ventricular Rate 63 BPM    Atrial Rate 63 BPM    P-R Interval 140 ms    QRS Duration 86 ms    Q-T Interval 400 ms    QTC Calculation (Bezet) 409 ms    Calculated P Axis 53 degrees    Calculated R Axis 31 degrees    Calculated T Axis 33 degrees    Diagnosis       Normal sinus rhythm  When compared with ECG of 18-MAY-2017 02:10,  T wave amplitude has decreased in Lateral leads     CBC WITH AUTOMATED DIFF    Collection Time: 05/22/17  2:22 PM   Result Value Ref Range    WBC 6.4 4.1 - 11.1 K/uL    RBC 4.96 4.10 - 5.70 M/uL    HGB 14.8 12.1 - 17.0 g/dL    HCT 41.9 36.6 - 50.3 %    MCV 84.5 80.0 - 99.0 FL    MCH 29.8 26.0 - 34.0 PG    MCHC 35.3 30.0 - 36.5 g/dL    RDW 12.9 11.5 - 14.5 %    PLATELET 956 649 - 838 K/uL    NEUTROPHILS 64 32 - 75 %    LYMPHOCYTES 28 12 - 49 %    MONOCYTES 6 5 - 13 %    EOSINOPHILS 2 0 - 7 %    BASOPHILS 0 0 - 1 %    ABS. NEUTROPHILS 4.1 1.8 - 8.0 K/UL    ABS. LYMPHOCYTES 1.8 0.8 - 3.5 K/UL    ABS. MONOCYTES 0.4 0.0 - 1.0 K/UL    ABS. EOSINOPHILS 0.1 0.0 - 0.4 K/UL    ABS. BASOPHILS 0.0 0.0 - 0.1 K/UL   METABOLIC PANEL, COMPREHENSIVE    Collection Time: 05/22/17  2:22 PM   Result Value Ref Range    Sodium 139 136 - 145 mmol/L    Potassium 4.0 3.5 - 5.1 mmol/L    Chloride 105 97 - 108 mmol/L    CO2 24 21 - 32 mmol/L    Anion gap 10 5 - 15 mmol/L    Glucose 189 (H) 65 - 100 mg/dL    BUN 16 6 - 20 MG/DL    Creatinine 1.28 0.70 - 1.30 MG/DL    BUN/Creatinine ratio 13 12 - 20      GFR est AA >60 >60 ml/min/1.73m2    GFR est non-AA 56 (L) >60 ml/min/1.73m2    Calcium 10.0 8.5 - 10.1 MG/DL    Bilirubin, total 0.9 0.2 - 1.0 MG/DL    ALT (SGPT) 31 12 - 78 U/L    AST (SGOT) 20 15 - 37 U/L    Alk. phosphatase 73 45 - 117 U/L    Protein, total 7.8 6.4 - 8.2 g/dL    Albumin 3.9 3.5 - 5.0 g/dL    Globulin 3.9 2.0 - 4.0 g/dL    A-G Ratio 1.0 (L) 1.1 - 2.2     TROPONIN I    Collection Time: 05/22/17  2:22 PM   Result Value Ref Range    Troponin-I, Qt. <0.04 <0.05 ng/mL   CK W/ CKMB & INDEX    Collection Time: 05/22/17  2:22 PM   Result Value Ref Range    CK 68 39 - 308 U/L    CK - MB 1.1 <3.6 NG/ML    CK-MB Index 1.6 0 - 2.5     SALICYLATE    Collection Time: 05/22/17  2:22 PM   Result Value Ref Range    SALICYLATE <8.5 (L) 2.8 - 20.0 MG/DL       Cta Chest W Or W Wo Cont    Result Date: 5/22/2017  INDICATION:   R sided cp, tachypnea COMPARISON: None. TECHNIQUE: Precontrast  images were obtained to localize the volume for acquisition. Multislice helical CT arteriography was performed from the diaphragm to the thoracic inlet during uneventful rapid bolus intravenous administration of 80 mL of Isovue-370. Lung and soft tissue windows were generated. Post processing was performed and coronal reformatted images were also generated. 3 D imaging was performed.   CT dose reduction was achieved through use of a standardized protocol tailored for this examination and automatic exposure control for dose modulation. FINDINGS: Thyroid gland is unremarkable. Lungs demonstrate slight dependent atelectasis. No pulmonary masses are identified. There is a 4 mm nodule image 55 right middle lobe which is unchanged from 2014. There is a 2 mm there is a 2 mm nodule image 60 right midlung which is unchanged. The pulmonary arteries are well enhanced and no pulmonary emboli are identified. There is no mediastinal or hilar adenopathy or mass. The aorta enhances normally without evidence of aneurysm or dissection. There is a small hiatal hernia. IMPRESSION: No acute abnormality identified. There is no pulmonary emboli identified. There is slight dependent atelectasis lung bases. [place] Meter Chest Port    Result Date: 5/22/2017  EXAM:  XR CHEST PORT INDICATION:  fall, sob COMPARISON:  2015 FINDINGS: A portable AP radiograph of the chest was obtained at 1429 hours. The patient is on a cardiac monitor. The lungs are clear. The cardiac and mediastinal contours and pulmonary vascularity are normal.  The bones and soft tissues are grossly within normal limits.      IMPRESSION: No acute abnormality identified

## 2017-05-22 NOTE — ED NOTES
Using translation phone discharge instructions reviewed with and given to pt. and son. No obvious distress noted at time of discharge. Ambulatory on own accord for discharge.

## 2017-05-22 NOTE — ED PROVIDER NOTES
HPI Comments: 74 y.o. male with past medical history significant for hypertension, CVA, B12 deficiency and hypercholesterolemia who presents from home with chief complaint of shortness of breath. Patient does not speak [place], history is provided by the son-in-law. Patient was seen in the ED 4 days ago for an unwitnessed fall in the restroom. Per son-in-law, patient hit his right ribs on the side of the bath tub but did not hit his head. Patient had head and chest CTs done during the visit without acute findings. He was discharged home at that time. Patient arrives today for sudden onset shortness of breath that began this morning at 0500 (9 hours PTA) with associated nausea w/o vomiting and right sided rib pain. Per son-in-law, patient had not had any issues until this morning. Patient denies the presence of a fever or abdominal pain. There are no other acute medical concerns at this time. Social hx: Nonsmoker. Social alcohol use. PCP: Ismael Martinez MD    Note written by nuris Guzmán, as dictated by Faye Berumen MD 2:30 PM      The history is provided by the patient. Past Medical History:   Diagnosis Date    B12 deficiency 08/08/13    level 0    Hypercholesterolemia     Hypertension     Stroke [facility])        History reviewed. No pertinent surgical history. History reviewed. No pertinent family history. Social History     Social History    Marital status:      Spouse name: N/A    Number of children: N/A    Years of education: N/A     Occupational History    Not on file.      Social History Main Topics    Smoking status: Never Smoker    Smokeless tobacco: Current User     Types: Snuff    Alcohol use 0.0 oz/week     0 Standard drinks or equivalent per week      Comment: Social    Drug use: No    Sexual activity: Not on file     Other Topics Concern    Not on file     Social History Narrative         ALLERGIES: Review of patient's allergies indicates no known allergies. Review of Systems   Constitutional: Negative for diaphoresis and fever. HENT: Negative for facial swelling. Eyes: Negative for visual disturbance. Respiratory: Positive for shortness of breath. Negative for cough. Cardiovascular: Negative for chest pain. Gastrointestinal: Positive for nausea. Negative for abdominal pain and vomiting. Genitourinary: Negative for dysuria. Musculoskeletal: Negative for joint swelling. Right sided rib pain   Skin: Negative for rash. Neurological: Negative for headaches. Hematological: Negative for adenopathy. Psychiatric/Behavioral: Negative for suicidal ideas. All other systems reviewed and are negative. There were no vitals filed for this visit. Physical Exam   Constitutional: He is oriented to person, place, and time. He appears well-developed and well-nourished. No distress. HENT:   Head: Normocephalic and atraumatic. Mouth/Throat: Oropharynx is clear and moist.   Eyes: Pupils are equal, round, and reactive to light. Neck: Normal range of motion. Neck supple. Cardiovascular: Normal rate, regular rhythm, normal heart sounds and intact distal pulses. Pulmonary/Chest: Effort normal and breath sounds normal. Tachypnea noted. Bradypneic: Lungs clear. No respiratory distress. Tachypneic. Lungs clear. Abdominal: Soft. Bowel sounds are normal. He exhibits no distension. There is no tenderness. Musculoskeletal: Normal range of motion. He exhibits no edema. Neurological: He is alert and oriented to person, place, and time. Skin: Skin is warm and dry. Nursing note and vitals reviewed. Note written by nuris Lowe, as dictated by Angeles Graham MD 2:31 PM      MDM  Number of Diagnoses or Management Options  Diagnosis management comments: A:  72yo M woke up this AM with sob. tachypnic in ED. Other VS stable. Pt had fall in bathroom injurying R side and was seen here on 5/18.   CXR and CT chest w/o contrast neg at that time. DDx:  Pneumonia, effusion, occult fracture or injury, PE, acidosis    P:  Labs  cxr  ecg  cta chest  reassess    ED Course       Procedures    ED EKG interpretation:  Rhythm: normal sinus rhythm. Rate (approx.): 63.  Axis: normal.  ST segment:  No concerning ST elevations or depressions. This EKG was interpreted by Ab Short MD,ED Provider. Labs unremarkable. Trop neg    Portable Chest Xray:  Portable chest xray showing no signs of acute disease. This CXR was interpreted by Ab Short MD, ED Provider. CT Angio Chest for PE:  IMPRESSION  IMPRESSION: No acute abnormality identified. There is no pulmonary emboli  identified. There is slight dependent atelectasis lung bases. Shelvy Setting 4:05 PM  Patient resting comfortably with no complaints at this time. VS remain stable. Repeat physical exam is unremarkable. Pt ambulatory without difficulty and tolerating po's well. Pt now with normal VS.  NO complaints. Likely intoxicated similarly to previous visit. Stable for discharge home.

## 2017-05-22 NOTE — ED TRIAGE NOTES
Shortness or breath started this am, pt luo and tachypneic in triage, denies cp, denies fever, +nausea, pt had a fall 2 days ago and hit his right rib

## 2017-05-23 ENCOUNTER — PATIENT OUTREACH (OUTPATIENT)
Dept: FAMILY MEDICINE CLINIC | Age: 67
End: 2017-05-23

## 2017-05-23 NOTE — PROGRESS NOTES
NNTOC-ED    Patient listed on discharge [facility]) report dated 2017. Patient discharged from [facility] for chest wall pain. Contacted patient to perform post ED/UC discharge assessment. Verified  and address with patient's son-in-law Sherwin Shipley(listed on HIPPA) as identifiers. Provided introduction to self, and explanation of the NN role. Mr. Esa Gomez states, \"He's not right. They said everything was find with the test yesterday. We need him to be seen by his doctor. \"  This writer informed Mr. Esa Gomez that a message will be sent to the provider's nurse regarding scheduling an appointment. Performed medication reconciliation with patient, and patient verbalizes understanding of administration of home medications. Unable to review discharge instructions with Mr. Esa Gomez at this time, as he was \"in the hospital\" and did not have the papers with him. He has requested an appointment with the PCP. This write has sent a message to the nurse for the PCP regarding the requested appointment, and contacted the office to assist with facilitating the follow-up. Mr. Araya Look understanding of follow-up care. Future Appointments  Date Time Provider Trisha Tirado   2017 9:40 AM [facility]   Unable to review red flags with Mr. Esa Gomez at this time, as he was in the hospital and was not with the patient. No other clinical/social/functional needs noted. Patient given an opportunity to ask questions. Contact information provided to the patient for future reference or further questions. Spoke with MELISSA Stewart regarding the patient's need for a sooner appointment.

## 2017-05-24 ENCOUNTER — PATIENT OUTREACH (OUTPATIENT)
Dept: FAMILY MEDICINE CLINIC | Age: 67
End: 2017-05-24

## 2017-05-24 RX ORDER — ATORVASTATIN CALCIUM 10 MG/1
TABLET, FILM COATED ORAL
Qty: 30 TAB | Refills: 0 | Status: SHIPPED | OUTPATIENT
Start: 2017-05-24 | End: 2017-07-14 | Stop reason: SDUPTHER

## 2017-05-24 NOTE — PROGRESS NOTES
NN STEPAN note for:  Rama Flood, 74 y.o., 1950-NN spoke with  to coordinate an earlier appointment for this patient. Patient is scheduled for 17. Pt scheduled on 2017 at 5:00pm with Dr Kayce Dunbar for ED Follow up, Per Chucky Gaytan. 10:05 AM- NN called patient to inform him of follow up visit. Patient verified by , Name and address.  NN contact information given for questions or concerns.

## 2017-05-24 NOTE — PROGRESS NOTES
NN STEPAN note for:  Shanta Dalal, 74 y.o., 1950    Call received from Marline Montgomery RN  regarding need to schedule an appointment for this patient on 17, after she was unable to get an appointment scheduled. Patient has had two ED admissions this month. He has not followed up with his PCP after either Ed admission . Patient had Ed admission of 17 and 17. For a fall and Chest wall pain. Last seen by PCP on 5/10/17. Patient has extreme weakness,  Message sent to PCP requesting  If  We could bring him in for a follow-up in the next few days and evaluate for a home care and pulmonary consult? Please see most recent Ed note below:     Reference Note by:  Imelda Joe MD   Emergency Medicine   Expand All Collapse All        HPI Comments: 74 y.o. male with past medical history significant for HTN, stroke who presents accompanied by son with chief complaint of rib pain s/p GLF. Per son's translation, patient had an unwitnessed fall in the bathroom and hit his right ribs/flank on something earlier today. Per son, patient is unsure what he hit though he denies hitting his head. Per son, patient believes he was unconscious for an unspecified amount of time because he does not remember what happened to him. Son denies other complaints. There are no other acute medical concerns at this time.       Social hx: never smoker, social alcohol drinker   PCP: Rebecca Lopez MD     17-NN spoke with  to coordinate an earlier appointment for this patient. Patient is scheduled for 17. Pt scheduled on 2017 at 5:00pm with Dr Pardeep Perez for ED Follow up, Per Frankie Damon. 10:05 AM- NN called patient to inform him of follow up visit. Patient verified by , Name and address. NN contact information given for questions or concerns.

## 2017-05-24 NOTE — TELEPHONE ENCOUNTER
Received faxed refill request for this medication from the pharmacy that is on file.     atorvastatin (LIPITOR) 10 mg tablet [Pharmacy Med Name: ATORVASTATIN 10 MG TABLET]  TAKE 1 TABLET BY MOUTH EVERY DAY, Normal, Disp-30 Tab, R-5

## 2017-05-29 ENCOUNTER — APPOINTMENT (OUTPATIENT)
Dept: CT IMAGING | Age: 67
End: 2017-05-29
Attending: EMERGENCY MEDICINE
Payer: MEDICAID

## 2017-05-29 ENCOUNTER — HOSPITAL ENCOUNTER (EMERGENCY)
Age: 67
Discharge: HOME | End: 2017-05-29
Attending: EMERGENCY MEDICINE
Payer: MEDICAID

## 2017-05-29 ENCOUNTER — APPOINTMENT (OUTPATIENT)
Dept: GENERAL RADIOLOGY | Age: 67
End: 2017-05-29
Attending: EMERGENCY MEDICINE
Payer: MEDICAID

## 2017-05-29 VITALS
OXYGEN SATURATION: 100 % | TEMPERATURE: 98 F | SYSTOLIC BLOOD PRESSURE: 161 MMHG | RESPIRATION RATE: 18 BRPM | DIASTOLIC BLOOD PRESSURE: 86 MMHG | HEART RATE: 62 BPM

## 2017-05-29 DIAGNOSIS — R07.9 ACUTE CHEST PAIN: ICD-10-CM

## 2017-05-29 DIAGNOSIS — M25.511 ACUTE PAIN OF RIGHT SHOULDER: Primary | ICD-10-CM

## 2017-05-29 LAB
ALBUMIN SERPL BCP-MCNC: 3.9 G/DL (ref 3.5–5)
ALBUMIN/GLOB SERPL: 1 {RATIO} (ref 1.1–2.2)
ALP SERPL-CCNC: 78 U/L (ref 45–117)
ALT SERPL-CCNC: 27 U/L (ref 12–78)
ANION GAP BLD CALC-SCNC: 12 MMOL/L (ref 5–15)
AST SERPL W P-5'-P-CCNC: 19 U/L (ref 15–37)
BASOPHILS # BLD AUTO: 0 K/UL (ref 0–0.1)
BASOPHILS # BLD: 0 % (ref 0–1)
BILIRUB SERPL-MCNC: 1.6 MG/DL (ref 0.2–1)
BUN SERPL-MCNC: 7 MG/DL (ref 6–20)
BUN/CREAT SERPL: 6 (ref 12–20)
CALCIUM SERPL-MCNC: 9.8 MG/DL (ref 8.5–10.1)
CHLORIDE SERPL-SCNC: 109 MMOL/L (ref 97–108)
CK SERPL-CCNC: 81 U/L (ref 39–308)
CO2 SERPL-SCNC: 18 MMOL/L (ref 21–32)
CREAT SERPL-MCNC: 1.14 MG/DL (ref 0.7–1.3)
EOSINOPHIL # BLD: 0.1 K/UL (ref 0–0.4)
EOSINOPHIL NFR BLD: 1 % (ref 0–7)
ERYTHROCYTE [DISTWIDTH] IN BLOOD BY AUTOMATED COUNT: 13.1 % (ref 11.5–14.5)
GLOBULIN SER CALC-MCNC: 4.1 G/DL (ref 2–4)
GLUCOSE SERPL-MCNC: 99 MG/DL (ref 65–100)
HCT VFR BLD AUTO: 39.4 % (ref 36.6–50.3)
HGB BLD-MCNC: 14 G/DL (ref 12.1–17)
LYMPHOCYTES # BLD AUTO: 22 % (ref 12–49)
LYMPHOCYTES # BLD: 1.7 K/UL (ref 0.8–3.5)
MCH RBC QN AUTO: 29.9 PG (ref 26–34)
MCHC RBC AUTO-ENTMCNC: 35.5 G/DL (ref 30–36.5)
MCV RBC AUTO: 84 FL (ref 80–99)
MONOCYTES # BLD: 0.9 K/UL (ref 0–1)
MONOCYTES NFR BLD AUTO: 11 % (ref 5–13)
NEUTS SEG # BLD: 5.2 K/UL (ref 1.8–8)
NEUTS SEG NFR BLD AUTO: 66 % (ref 32–75)
PLATELET # BLD AUTO: 208 K/UL (ref 150–400)
POTASSIUM SERPL-SCNC: 3.5 MMOL/L (ref 3.5–5.1)
PROT SERPL-MCNC: 8 G/DL (ref 6.4–8.2)
RBC # BLD AUTO: 4.69 M/UL (ref 4.1–5.7)
SODIUM SERPL-SCNC: 139 MMOL/L (ref 136–145)
TROPONIN I SERPL-MCNC: <0.04 NG/ML
WBC # BLD AUTO: 7.9 K/UL (ref 4.1–11.1)

## 2017-05-29 PROCEDURE — 96374 THER/PROPH/DIAG INJ IV PUSH: CPT

## 2017-05-29 PROCEDURE — 93005 ELECTROCARDIOGRAM TRACING: CPT

## 2017-05-29 PROCEDURE — 74011250636 HC RX REV CODE- 250/636: Performed by: EMERGENCY MEDICINE

## 2017-05-29 PROCEDURE — 80053 COMPREHEN METABOLIC PANEL: CPT | Performed by: EMERGENCY MEDICINE

## 2017-05-29 PROCEDURE — 85025 COMPLETE CBC W/AUTO DIFF WBC: CPT | Performed by: EMERGENCY MEDICINE

## 2017-05-29 PROCEDURE — 96361 HYDRATE IV INFUSION ADD-ON: CPT

## 2017-05-29 PROCEDURE — 36415 COLL VENOUS BLD VENIPUNCTURE: CPT | Performed by: EMERGENCY MEDICINE

## 2017-05-29 PROCEDURE — 99284 EMERGENCY DEPT VISIT MOD MDM: CPT

## 2017-05-29 PROCEDURE — 71275 CT ANGIOGRAPHY CHEST: CPT

## 2017-05-29 PROCEDURE — 74011000258 HC RX REV CODE- 258: Performed by: EMERGENCY MEDICINE

## 2017-05-29 PROCEDURE — 84484 ASSAY OF TROPONIN QUANT: CPT | Performed by: EMERGENCY MEDICINE

## 2017-05-29 PROCEDURE — 74011636320 HC RX REV CODE- 636/320: Performed by: EMERGENCY MEDICINE

## 2017-05-29 PROCEDURE — 82550 ASSAY OF CK (CPK): CPT | Performed by: EMERGENCY MEDICINE

## 2017-05-29 PROCEDURE — 71020 XR CHEST PA LAT: CPT

## 2017-05-29 RX ORDER — SODIUM CHLORIDE 0.9 % (FLUSH) 0.9 %
10 SYRINGE (ML) INJECTION
Status: COMPLETED | OUTPATIENT
Start: 2017-05-29 | End: 2017-05-29

## 2017-05-29 RX ORDER — CHOLECALCIFEROL (VITAMIN D3) 125 MCG
CAPSULE ORAL
Qty: 100 CAP | Refills: 0 | Status: SHIPPED | OUTPATIENT
Start: 2017-05-29 | End: 2017-09-03 | Stop reason: SDUPTHER

## 2017-05-29 RX ORDER — HYDROCODONE BITARTRATE AND ACETAMINOPHEN 5; 325 MG/1; MG/1
1 TABLET ORAL
Qty: 20 TAB | Refills: 0 | Status: SHIPPED | OUTPATIENT
Start: 2017-05-29 | End: 2017-09-11 | Stop reason: ALTCHOICE

## 2017-05-29 RX ORDER — MORPHINE SULFATE 2 MG/ML
4 INJECTION, SOLUTION INTRAMUSCULAR; INTRAVENOUS
Status: COMPLETED | OUTPATIENT
Start: 2017-05-29 | End: 2017-05-29

## 2017-05-29 RX ADMIN — IOPAMIDOL 75 ML: 755 INJECTION, SOLUTION INTRAVENOUS at 10:51

## 2017-05-29 RX ADMIN — SODIUM CHLORIDE 1000 ML: 900 INJECTION, SOLUTION INTRAVENOUS at 11:05

## 2017-05-29 RX ADMIN — Medication 10 ML: at 10:51

## 2017-05-29 RX ADMIN — SODIUM CHLORIDE 100 ML: 900 INJECTION, SOLUTION INTRAVENOUS at 10:51

## 2017-05-29 RX ADMIN — Medication 4 MG: at 11:05

## 2017-05-29 NOTE — ED NOTES
Discharge instructions given to patient by MD and nurse. Pt has been given counseling on medication use and verbalizes understanding. IV d/c. Pt ambulated off of unit in no signs of distress.

## 2017-05-29 NOTE — ED TRIAGE NOTES
Triage: Sister-in-law speaking with  on blue phone #[phone]  d/t pt unable to speak for himself d/t pain and SOB. Per sister-in-law, pt c/o SOB with intermittent dry cough starting fifteen days ago and right shoulder pain starting three days ago. Denies chest pain, fever, N/V/D, injury or fall. On blue phone x24 minutes.

## 2017-05-29 NOTE — ED PROVIDER NOTES
HPI Comments: 74 y.o. male with past medical history significant for HTN, stroke, hypercholesterolemia, and B12 insuffiency who presents from home with chief complaint of SOB. Pt does not speak english so history is provided by his family member and through use of a blue phone. Pt states that he has had SOB for 3-4 weeks. He has also had and intermittent cough for the past 2 weeks and R shoulder pain for the past couple days that he has never had before. The shoulder pain radiates into his his and in to his R shoulder. He has not taken any medications for the pain and the pain is not worsened by movement. He denies any chest pain, fever, nausea, vomiting, or diarrhea. There are no other acute medical concerns at this time. PCP: Chai Perdomo MD    Note written by Daylin Hloder, as dictated by Yuly Hardy MD 10:54 AM      The history is provided by the patient. A  was used [employer] used). Past Medical History:   Diagnosis Date    B12 deficiency 08/08/13    level 0    Hypercholesterolemia     Hypertension     Stroke [facility])        History reviewed. No pertinent surgical history. History reviewed. No pertinent family history. Social History     Social History    Marital status:      Spouse name: N/A    Number of children: N/A    Years of education: N/A     Occupational History    Not on file. Social History Main Topics    Smoking status: Never Smoker    Smokeless tobacco: Current User     Types: Snuff    Alcohol use 0.0 oz/week     0 Standard drinks or equivalent per week      Comment: Social    Drug use: No    Sexual activity: Not on file     Other Topics Concern    Not on file     Social History Narrative         ALLERGIES: Review of patient's allergies indicates no known allergies. Review of Systems   Constitutional: Negative for chills and fever. HENT: Positive for rhinorrhea. Negative for sore throat.     Respiratory: Positive for cough and shortness of breath. Cardiovascular: Negative for chest pain. Gastrointestinal: Negative for abdominal pain, diarrhea, nausea and vomiting. Genitourinary: Negative for dysuria and hematuria. Musculoskeletal: Positive for arthralgias and neck pain. Negative for myalgias. Skin: Negative for pallor and rash. Neurological: Negative for dizziness, weakness and light-headedness. All other systems reviewed and are negative. Vitals:    05/29/17 0947 05/29/17 1000 05/29/17 1015   BP: 131/66     Pulse: 95     Resp: 16     Temp: 97.5 °F (36.4 °C)     SpO2: 99% 100% 100%            Physical Exam   Const:  Appears uncomfortable, well developed, well nourished  Head:  Atraumatic, normocephalic  Eyes:  PERRL, conjunctiva normal, no scleral icterus  Neck:  Supple, trachea midline  Cardiovascular:  RRR, no murmurs, no gallops, no rubs  Resp:  No resp distress, no increased work of breathing, no wheezes, no rhonchi, no rales,  Abd:  Soft, non-tender, non-distended, no rebound, no guarding, no CVA tenderness  :  Deferred  MSK:  No pedal edema, normal ROM, No c-spine, elbow, or shoulder tenderness. Full ROM of c-spine, elbow, and shoulder tenderness. No ecchymosis. Neuro:  Alert and oriented x3, no cranial nerve defect  Skin:  Warm, dry, intact  Psych: normal mood and affect, behavior is normal, judgement and thought content is normal    Note written by Daylin Díaz, as dictated by Jewell Bonds MD 10:57 AM    MDM  Number of Diagnoses or Management Options  Acute chest pain:   Acute pain of right shoulder:      Amount and/or Complexity of Data Reviewed  Clinical lab tests: ordered and reviewed  Tests in the radiology section of CPT®: ordered and reviewed  Review and summarize past medical records: yes    Patient Progress  Patient progress: stable    ED Course     Pt. Presents to the ER with right shoulder/chest pain and SOB. He is well appearing in the ER.   Pt. Says that he is pain free at the time of discharge. No PE or CT. No pneumonia. Pt. Has f/u with her PCP this week. I will start her on norco.  Pt. To f/u with his PCP or return to the ER with worsening sx.       Procedures

## 2017-05-29 NOTE — DISCHARGE INSTRUCTIONS
Chest Pain: Care Instructions  Your Care Instructions  There are many things that can cause chest pain. Some are not serious and will get better on their own in a few days. But some kinds of chest pain need more testing and treatment. Your doctor may have recommended a follow-up visit in the next 8 to 12 hours. If you are not getting better, you may need more tests or treatment. Even though your doctor has released you, you still need to watch for any problems. The doctor carefully checked you, but sometimes problems can develop later. If you have new symptoms or if your symptoms do not get better, get medical care right away. If you have worse or different chest pain or pressure that lasts more than 5 minutes or you passed out (lost consciousness), call 911 or seek other emergency help right away. A medical visit is only one step in your treatment. Even if you feel better, you still need to do what your doctor recommends, such as going to all suggested follow-up appointments and taking medicines exactly as directed. This will help you recover and help prevent future problems. How can you care for yourself at home? · Rest until you feel better. · Take your medicine exactly as prescribed. Call your doctor if you think you are having a problem with your medicine. · Do not drive after taking a prescription pain medicine. When should you call for help? Call 911 if:  · You passed out (lost consciousness). · You have severe difficulty breathing. · You have symptoms of a heart attack. These may include:  ¨ Chest pain or pressure, or a strange feeling in your chest.  ¨ Sweating. ¨ Shortness of breath. ¨ Nausea or vomiting. ¨ Pain, pressure, or a strange feeling in your back, neck, jaw, or upper belly or in one or both shoulders or arms. ¨ Lightheadedness or sudden weakness. ¨ A fast or irregular heartbeat.   After you call 911, the  may tell you to chew 1 adult-strength or 2 to 4 low-dose aspirin. Wait for an ambulance. Do not try to drive yourself. Call your doctor today if:  · You have any trouble breathing. · Your chest pain gets worse. · You are dizzy or lightheaded, or you feel like you may faint. · You are not getting better as expected. · You are having new or different chest pain. Where can you learn more? Go to http://bri-riley.info/. Enter A120 in the search box to learn more about \"Chest Pain: Care Instructions. \"  Current as of: May 27, 2016  Content Version: 11.2  © 7106-2588 [employer]. Care instructions adapted under license by [employer] (which disclaims liability or warranty for this information). If you have questions about a medical condition or this instruction, always ask your healthcare professional. [address] any warranty or liability for your use of this information. We hope that we have addressed all of your medical concerns. The examination and treatment you received in the Emergency Department were for an emergent problem and were not intended as complete care. It is important that you follow up with your healthcare provider(s) for ongoing care. If your symptoms worsen or do not improve as expected, and you are unable to reach your usual health care provider(s), you should return to the Emergency Department. Today's healthcare is undergoing tremendous change, and patient satisfaction surveys are one of the many tools to assess the quality of medical care. You may receive a survey from the [employer] organization regarding your experience in the Emergency Department. I hope that your experience has been completely positive, particularly the medical care that I provided. As such, please participate in the survey; anything less than excellent does not meet my expectations or intentions.         [facility] and 8 [facility] participate in nationally recognized quality of care measures. If your blood pressure is greater than 120/80, as reported below, we urge that you seek medical care to address the potential of high blood pressure, commonly known as hypertension. Hypertension can be hereditary or can be caused by certain medical conditions, pain, stress, or \"white coat syndrome. \"       Please make an appointment with your health care provider(s) for follow up of your Emergency Department visit. VITALS:   Patient Vitals for the past 8 hrs:   Temp Pulse Resp BP SpO2   05/29/17 1155 - 63 25 - 100 %   05/29/17 1145 - - - - 100 %   05/29/17 1130 - - - 142/75 100 %   05/29/17 1123 98.3 °F (36.8 °C) (!) 59 16 155/77 100 %   05/29/17 1030 - - - 141/78 100 %   05/29/17 1015 - - - - 100 %   05/29/17 1000 - - - - 100 %   05/29/17 0947 97.5 °F (36.4 °C) 95 16 131/66 99 %          Thank you for allowing us to provide you with medical care today. We realize that you have many choices for your emergency care needs. Please choose us in the future for any continued health care needs. Mo Orlandoing Chelsy Scale, 16 [facility].   Office: [phone]            Recent Results (from the past 24 hour(s))   CBC WITH AUTOMATED DIFF    Collection Time: 05/29/17 10:10 AM   Result Value Ref Range    WBC 7.9 4.1 - 11.1 K/uL    RBC 4.69 4.10 - 5.70 M/uL    HGB 14.0 12.1 - 17.0 g/dL    HCT 39.4 36.6 - 50.3 %    MCV 84.0 80.0 - 99.0 FL    MCH 29.9 26.0 - 34.0 PG    MCHC 35.5 30.0 - 36.5 g/dL    RDW 13.1 11.5 - 14.5 %    PLATELET 964 155 - 643 K/uL    NEUTROPHILS 66 32 - 75 %    LYMPHOCYTES 22 12 - 49 %    MONOCYTES 11 5 - 13 %    EOSINOPHILS 1 0 - 7 %    BASOPHILS 0 0 - 1 %    ABS. NEUTROPHILS 5.2 1.8 - 8.0 K/UL    ABS. LYMPHOCYTES 1.7 0.8 - 3.5 K/UL    ABS. MONOCYTES 0.9 0.0 - 1.0 K/UL    ABS. EOSINOPHILS 0.1 0.0 - 0.4 K/UL    ABS.  BASOPHILS 0.0 0.0 - 0.1 K/UL   METABOLIC PANEL, COMPREHENSIVE    Collection Time: 05/29/17 10:10 AM   Result Value Ref Range    Sodium 139 136 - 145 mmol/L    Potassium 3.5 3.5 - 5.1 mmol/L    Chloride 109 (H) 97 - 108 mmol/L    CO2 18 (L) 21 - 32 mmol/L    Anion gap 12 5 - 15 mmol/L    Glucose 99 65 - 100 mg/dL    BUN 7 6 - 20 MG/DL    Creatinine 1.14 0.70 - 1.30 MG/DL    BUN/Creatinine ratio 6 (L) 12 - 20      GFR est AA >60 >60 ml/min/1.73m2    GFR est non-AA >60 >60 ml/min/1.73m2    Calcium 9.8 8.5 - 10.1 MG/DL    Bilirubin, total 1.6 (H) 0.2 - 1.0 MG/DL    ALT (SGPT) 27 12 - 78 U/L    AST (SGOT) 19 15 - 37 U/L    Alk. phosphatase 78 45 - 117 U/L    Protein, total 8.0 6.4 - 8.2 g/dL    Albumin 3.9 3.5 - 5.0 g/dL    Globulin 4.1 (H) 2.0 - 4.0 g/dL    A-G Ratio 1.0 (L) 1.1 - 2.2     CK W/ REFLX CKMB    Collection Time: 05/29/17 10:10 AM   Result Value Ref Range    CK 81 39 - 308 U/L   TROPONIN I    Collection Time: 05/29/17 10:10 AM   Result Value Ref Range    Troponin-I, Qt. <0.04 <0.05 ng/mL   EKG, 12 LEAD, INITIAL    Collection Time: 05/29/17 10:20 AM   Result Value Ref Range    Ventricular Rate 76 BPM    Atrial Rate 76 BPM    P-R Interval 136 ms    QRS Duration 92 ms    Q-T Interval 388 ms    QTC Calculation (Bezet) 436 ms    Calculated P Axis 65 degrees    Calculated R Axis 45 degrees    Calculated T Axis 28 degrees    Diagnosis       Normal sinus rhythm  When compared with ECG of 22-MAY-2017 14:21,  No significant change was found         Xr Chest Pa Lat    Result Date: 5/29/2017  EXAM:  XR CHEST PA LAT INDICATION:   sob COMPARISON: 5/22/2017. FINDINGS: AP and lateral radiographs of the chest demonstrate lungs clear of an acute process. Heart size is upper limits of normal.  Bony structures are intact. IMPRESSION: No acute abnormality is identified. Cta Chest W Or W Wo Cont    Result Date: 5/29/2017  EXAM:  CTA CHEST W OR W WO CONT INDICATION:   Shortness of breath and shoulder pain. Dry cough. COMPARISON: 5/22/2017. CONTRAST:  75 mL of Isovue-370.  TECHNIQUE: Precontrast  images were obtained to localize the volume for acquisition. Multislice helical CT arteriography was performed from the diaphragm to the thoracic inlet during uneventful rapid bolus intravenous contrast administration. Lung and soft tissue windows were generated. Coronal and sagittal images were generated and 3D post processing consisting of coronal maximum intensity images was performed. CT dose reduction was achieved through use of a standardized protocol tailored for this examination and automatic exposure control for dose modulation. FINDINGS: LUNGS:  Calcified granulomas noted in the right middle lobe. There is no soft tissue nodule. No acute findings seen in the lung parenchyma. Jose Ferrer PLEURA: No pleural effusion or pneumothorax. TRACHEA/BRONCHI: Patent. PULMONARY ARTERIES: The pulmonary arteries are well enhanced and no pulmonary emboli are identified. MEDIASTINUM/NITIN: There is no mediastinal or hilar adenopathy or mass. AORTA: The aorta enhances normally without evidence of aneurysm or dissection. UPPER ABDOMEN: The visualized portions of the upper abdominal organs are normal. BONES: No sclerotic or lytic lesion. IMPRESSION: No acute finding or evidence of pulmonary embolus. Jose Ferrer

## 2017-05-29 NOTE — ED NOTES
Bedside and Verbal shift change report given to Ruth Mcgregor RN (oncoming nurse) by Nhi Navarro RN (offgoing nurse). Report included the following information ED Summary, MAR and Recent Results.

## 2017-05-30 ENCOUNTER — TELEPHONE (OUTPATIENT)
Dept: FAMILY MEDICINE CLINIC | Age: 67
End: 2017-05-30

## 2017-05-30 LAB
ATRIAL RATE: 76 BPM
CALCULATED P AXIS, ECG09: 65 DEGREES
CALCULATED R AXIS, ECG10: 45 DEGREES
CALCULATED T AXIS, ECG11: 28 DEGREES
DIAGNOSIS, 93000: NORMAL
P-R INTERVAL, ECG05: 136 MS
Q-T INTERVAL, ECG07: 388 MS
QRS DURATION, ECG06: 92 MS
QTC CALCULATION (BEZET), ECG08: 436 MS
VENTRICULAR RATE, ECG03: 76 BPM

## 2017-05-30 NOTE — TELEPHONE ENCOUNTER
----- Message from Mikey Fagan MD sent at 5/29/2017  1:00 PM EDT -----  Regarding: RE: Re-admit to [facility],  Actually my appointment slots for 05/31/2017 has been blocked from 5 PM onwards as I have provider meeting to attend, and that was reason it was blocked by [facility]. thanks  ----- Message -----     From: Gregorio Lo LPN     Sent: 6/56/7230   9:43 AM       To: Maribel Seay RN, Mikey Fagan MD  Subject: Roopa Augustin: Re-admit to Ed                               Pt scheduled on 05/31/2017 at 5:00pm with Dr Terra Cohen for ED Follow up, Per Rocky Chang.        ----- Message -----     From: Maribel Seay RN     Sent: 5/23/2017   2:52 PM       To: Kaylynn Vera RN, #  Subject: Re-admit to Ed                                   Patient had Ed admission of 5/18/17 and 5/22/17. For a fall and Chest wall pain. Last seen by PCP on 5/10/17. Patient has extreme weakness, can we bring him in for a follow-up in the next few days and evaluate for a home care and pulmonary consult? Please see note below. ... Dash Espinoza MD   Emergency Medicine   Expand All Collapse All      Hide copied text  Hover for attribution information  HPI Comments: 74 y.o. male with past medical history significant for HTN, stroke who presents accompanied by son with chief complaint of rib pain s/p GLF. Per son's translation, patient had an unwitnessed fall in the bathroom and hit his right ribs/flank on something earlier today. Per son, patient is unsure what he hit though he denies hitting his head. Per son, patient believes he was unconscious for an unspecified amount of time because he does not remember what happened to him. Son denies other complaints.  There are no other acute medical concerns at this time.     Social hx: never smoker, social alcohol drinker  PCP: Mikey Fagan MD      Thanks

## 2017-05-30 NOTE — TELEPHONE ENCOUNTER
Chief Complaint   Patient presents with   [facility] follow up ( transitional care ) at 4:20 pm 5/31/17 with Dr. Denton Ying. Patient's son Manny Esqueda was informed of appointment time with Dr. Denton Ying for hospital follow up. May 31, 2017 at 4:20 pm.  Manny Esqueda had no other questions at time of call.

## 2017-05-31 ENCOUNTER — OFFICE VISIT (OUTPATIENT)
Dept: FAMILY MEDICINE CLINIC | Age: 67
End: 2017-05-31

## 2017-05-31 VITALS
TEMPERATURE: 97.1 F | BODY MASS INDEX: 20.66 KG/M2 | DIASTOLIC BLOOD PRESSURE: 74 MMHG | OXYGEN SATURATION: 99 % | HEIGHT: 64 IN | SYSTOLIC BLOOD PRESSURE: 120 MMHG | HEART RATE: 60 BPM | WEIGHT: 121 LBS | RESPIRATION RATE: 16 BRPM

## 2017-05-31 DIAGNOSIS — S20.211A CONTUSION OF RIGHT CHEST WALL, INITIAL ENCOUNTER: ICD-10-CM

## 2017-05-31 DIAGNOSIS — Z09 HOSPITAL DISCHARGE FOLLOW-UP: Primary | ICD-10-CM

## 2017-05-31 DIAGNOSIS — J06.9 VIRAL URI: ICD-10-CM

## 2017-05-31 DIAGNOSIS — F10.920 ALCOHOL INTOXICATION, UNCOMPLICATED (HCC): ICD-10-CM

## 2017-05-31 RX ORDER — MONTELUKAST SODIUM 10 MG/1
TABLET ORAL
Refills: 1 | COMMUNITY
Start: 2017-04-25 | End: 2017-06-01 | Stop reason: SDUPTHER

## 2017-05-31 RX ORDER — ETODOLAC 400 MG/1
400 TABLET, FILM COATED ORAL 2 TIMES DAILY
Qty: 20 TAB | Refills: 0 | Status: SHIPPED | OUTPATIENT
Start: 2017-05-31 | End: 2017-09-11 | Stop reason: ALTCHOICE

## 2017-05-31 RX ORDER — DICLOFENAC SODIUM 30 MG/G
GEL TOPICAL 2 TIMES DAILY
Qty: 100 G | Refills: 0 | Status: SHIPPED | OUTPATIENT
Start: 2017-05-31 | End: 2017-09-11

## 2017-05-31 NOTE — TELEPHONE ENCOUNTER
Received faxed refill request for this medication from the pharmacy that is on file.     montelukast (SINGULAIR) 10 mg tablet  Normal   PLEASE SEND ASAP   Disp-30 Tab, R-1

## 2017-05-31 NOTE — MR AVS SNAPSHOT
Visit Information Date & Time Provider Department Dept. Phone Encounter #  
 5/31/2017  4:20 PM Ellie Frost, [address] [facility] [phone] [id number] Follow-up Instructions Return if symptoms worsen or fail to improve. Your Appointments 9/11/2017  9:40 AM  
COMPLETE 40 with Ellie Frost MD  
[facility]) Appt Note: cpe  
 [phone]  
[phone]  
  
   
 [address] [address] Upcoming Health Maintenance Date Due ZOSTER VACCINE AGE 60> 1/1/2012 GLAUCOMA SCREENING Q2Y 1/1/2017 MEDICARE YEARLY EXAM 1/1/2017 INFLUENZA AGE 9 TO ADULT 8/1/2017 COLONOSCOPY 1/21/2018 Pneumococcal 65+ Low/Medium Risk (2 of 2 - PCV13) 5/10/2018 DTaP/Tdap/Td series (2 - Td) 6/27/2024 Allergies as of 5/31/2017  Review Complete On: 5/31/2017 By: Ellie Frost MD  
 No Known Allergies Current Immunizations  Reviewed on 5/10/2017 Name Date Influenza Vaccine 12/10/2015 Influenza Vaccine (Quad) PF 12/10/2015 Pneumococcal Polysaccharide (PPSV-23) 5/10/2017 Td 6/27/2014, 7/2/2012 Tdap 6/13/2013 Not reviewed this visit You Were Diagnosed With   
  
 Codes Comments Hospital discharge follow-up    -  Primary ICD-10-CM: Citlaly Bello ICD-9-CM: V67.59 Alcohol intoxication, uncomplicated ([facility] 75.)     M-62-VX: F10.120 ICD-9-CM: 305.00 Contusion of right chest wall, initial encounter     ICD-10-CM: S20.211A ICD-9-CM: 922.1 Vitals BP Pulse Temp Resp Height(growth percentile) Weight(growth percentile) 120/74 (BP 1 Location: Left arm, BP Patient Position: Sitting) 60 97.1 °F (36.2 °C) (Oral) 16 5' 4\" (1.626 m) 121 lb (54.9 kg) SpO2 BMI Smoking Status 99% 20.77 kg/m2 Never Smoker Vitals History BMI and BSA Data Body Mass Index Body Surface Area  20.77 kg/m 2 1.57 m 2  
  
  
 Preferred Pharmacy Pharmacy Name Phone [facility]/PHARMACY #[id number]- [address] [employer] [facility] [phone] Your Updated Medication List  
  
   
This list is accurate as of: 5/31/17  4:52 PM.  Always use your most recent med list.  
  
  
  
  
 atenolol 25 mg tablet Commonly known as:  TENORMIN  
TAKE 1 TABLET BY MOUTH DAILY. atorvastatin 10 mg tablet Commonly known as:  LIPITOR  
TAKE 1 TABLET BY MOUTH EVERY DAY  
  
 cholecalciferol 5,000 unit capsule Commonly known as:  VITAMIN D3  
TAKE ONE CAPSULE BY MOUTH EVERY DAY  
  
 cromolyn 4 % ophthalmic solution Commonly known as:  OPTICROM Administer 1 Drop to both eyes four (4) times daily. Use in affected eye(s)  
  
 cyanocobalamin 500 mcg tablet Commonly known as:  VITAMIN B12  
TAKE 1 TAB BY MOUTH DAILY. diclofenac 3 % topical gel Commonly known as:  Jayde Opal Apply  to affected area two (2) times a day. etodolac 400 mg tablet Commonly known as:  LODINE Take 400 mg by mouth two (2) times a day. HYDROcodone-acetaminophen 5-325 mg per tablet Commonly known as:  Sherley Safe Take 1 Tab by mouth every four (4) hours as needed for Pain. Max Daily Amount: 6 Tabs. montelukast 10 mg tablet Commonly known as:  SINGULAIR  
TAKE 1 TABLET BY MOUTH EVERY DAY Prescriptions Sent to Pharmacy Refills  
 diclofenac (SOLARAZE) 3 % topical gel 0 Sig: Apply  to affected area two (2) times a day. Class: Normal  
 Pharmacy: [facility]/pharmacy #[id number]- [place] [address] [facility]: [phone] Route: Topical  
 etodolac (LODINE) 400 mg tablet 0 Sig: Take 400 mg by mouth two (2) times a day. Class: Normal  
 Pharmacy: [facility]/pharmacy #[id number]- [place] [address] [facility]: [phone] Route: Oral  
  
Follow-up Instructions Return if symptoms worsen or fail to improve. Patient Instructions Chest Contusion: Care Instructions Your Care Instructions A chest contusion, or bruise, is caused by a fall or direct blow to the chest. Car crashes, falls, getting punched, and injury from bicycle handlebars are common causes of chest contusions. A very forceful blow to the chest can injure the heart or blood vessels in the chest, the lungs, the airway, the liver, or the spleen. Pain may be caused by an injury to muscles, cartilage, or ribs. Deep breathing, coughing, or sneezing can increase your pain. Lying on the injured area also can cause pain. Follow-up care is a key part of your treatment and safety. Be sure to make and go to all appointments, and call your doctor if you are having problems. It's also a good idea to know your test results and keep a list of the medicines you take. How can you care for yourself at home? · Rest and protect the injured or sore area. Stop, change, or take a break from any activity that may be causing your pain. · Put ice or a cold pack on the area for 10 to 20 minutes at a time. Put a thin cloth between the ice and your skin. · After 2 or 3 days, if your swelling is gone, apply a heating pad set on low or a warm cloth to your chest. Some doctors suggest that you go back and forth between hot and cold. Put a thin cloth between the heating pad and your skin. · Do not wrap or tape your ribs for support. This may cause you to take smaller breaths, which could increase your risk of pneumonia and lung collapse. · Ask your doctor if you can take an over-the-counter pain medicine, such as acetaminophen (Tylenol), ibuprofen (Advil, Motrin), or naproxen (Aleve). Be safe with medicines. Read and follow all instructions on the label. · Take your medicines exactly as prescribed. Call your doctor if you think you are having a problem with your medicine.  
· Gentle stretching and massage may help you feel better after a few days of rest. Stretch slowly to the point just before discomfort begins, then hold the stretch for at least 15 to 30 seconds. Do this 3 or 4 times per day. · As your pain gets better, slowly return to your normal activities. Be patient, because chest bruises can take weeks or months to heal. Any increased pain may be a sign that you need to rest a while longer. When should you call for help? Call 911 anytime you think you may need emergency care. For example, call if: 
· You have severe trouble breathing. · You cough up blood. Call your doctor now or seek immediate medical care if: 
· You have belly pain. · You are dizzy or lightheaded, or you feel like you may faint. · You develop new symptoms with the chest pain. · Your chest pain gets worse. · You have a fever. · You have some shortness of breath. · You have a cough that brings up mucus from the lungs. Watch closely for changes in your health, and be sure to contact your doctor if: 
· Your chest pain is not improving after 1 week. Where can you learn more? Go to http://bri-riley.info/. Enter I174 in the search box to learn more about \"Chest Contusion: Care Instructions. \" Current as of: May 27, 2016 Content Version: 11.2 © 1373-5118 [employer]. Care instructions adapted under license by [employer] (which disclaims liability or warranty for this information). If you have questions about a medical condition or this instruction, always ask your healthcare professional. [place] [address] any warranty or liability for your use of this information. Introducing [facility]! [employer] introduces [employer] patient portal. Now you can access parts of your medical record, email your doctor's office, and request medication refills online. 1. In your internet browser, go to [email or web address] 2. Click on the First Time User? Click Here link in the Sign In box.  You will see the New Member Sign Up page. 3. Enter your link bird Access Code exactly as it appears below. You will not need to use this code after youve completed the sign-up process. If you do not sign up before the expiration date, you must request a new code. · link bird Access Code: [email or web address] Expires: 8/8/2017  8:35 AM 
 
4. Enter the last four digits of your Social Security Number (xxxx) and Date of Birth (mm/dd/yyyy) as indicated and click Submit. You will be taken to the next sign-up page. 5. Create a link bird ID. This will be your link bird login ID and cannot be changed, so think of one that is secure and easy to remember. 6. Create a link bird password. You can change your password at any time. 7. Enter your Password Reset Question and Answer. This can be used at a later time if you forget your password. 8. Enter your e-mail address. You will receive e-mail notification when new information is available in [address]. 9. Click Sign Up. You can now view and download portions of your medical record. 10. Click the Download Summary menu link to download a portable copy of your medical information. If you have questions, please visit the Frequently Asked Questions section of the link bird website. Remember, link bird is NOT to be used for urgent needs. For medical emergencies, dial 911. Now available from your iPhone and Android! Please provide this summary of care documentation to your next provider. Your primary care clinician is listed as Jerry Oneal. If you have any questions after today's visit, please call [phone].

## 2017-05-31 NOTE — PATIENT INSTRUCTIONS
Chest Contusion: Care Instructions  Your Care Instructions  A chest contusion, or bruise, is caused by a fall or direct blow to the chest. Car crashes, falls, getting punched, and injury from bicycle handlebars are common causes of chest contusions. A very forceful blow to the chest can injure the heart or blood vessels in the chest, the lungs, the airway, the liver, or the spleen. Pain may be caused by an injury to muscles, cartilage, or ribs. Deep breathing, coughing, or sneezing can increase your pain. Lying on the injured area also can cause pain. Follow-up care is a key part of your treatment and safety. Be sure to make and go to all appointments, and call your doctor if you are having problems. It's also a good idea to know your test results and keep a list of the medicines you take. How can you care for yourself at home? · Rest and protect the injured or sore area. Stop, change, or take a break from any activity that may be causing your pain. · Put ice or a cold pack on the area for 10 to 20 minutes at a time. Put a thin cloth between the ice and your skin. · After 2 or 3 days, if your swelling is gone, apply a heating pad set on low or a warm cloth to your chest. Some doctors suggest that you go back and forth between hot and cold. Put a thin cloth between the heating pad and your skin. · Do not wrap or tape your ribs for support. This may cause you to take smaller breaths, which could increase your risk of pneumonia and lung collapse. · Ask your doctor if you can take an over-the-counter pain medicine, such as acetaminophen (Tylenol), ibuprofen (Advil, Motrin), or naproxen (Aleve). Be safe with medicines. Read and follow all instructions on the label. · Take your medicines exactly as prescribed. Call your doctor if you think you are having a problem with your medicine.   · Gentle stretching and massage may help you feel better after a few days of rest. Stretch slowly to the point just before discomfort begins, then hold the stretch for at least 15 to 30 seconds. Do this 3 or 4 times per day. · As your pain gets better, slowly return to your normal activities. Be patient, because chest bruises can take weeks or months to heal. Any increased pain may be a sign that you need to rest a while longer. When should you call for help? Call 911 anytime you think you may need emergency care. For example, call if:  · You have severe trouble breathing. · You cough up blood. Call your doctor now or seek immediate medical care if:  · You have belly pain. · You are dizzy or lightheaded, or you feel like you may faint. · You develop new symptoms with the chest pain. · Your chest pain gets worse. · You have a fever. · You have some shortness of breath. · You have a cough that brings up mucus from the lungs. Watch closely for changes in your health, and be sure to contact your doctor if:  · Your chest pain is not improving after 1 week. Where can you learn more? Go to http://bri-riley.info/. Enter I174 in the search box to learn more about \"Chest Contusion: Care Instructions. \"  Current as of: May 27, 2016  Content Version: 11.2  © 6905-4703 Healthwise, Incorporated. Care instructions adapted under license by [employer] (which disclaims liability or warranty for this information). If you have questions about a medical condition or this instruction, always ask your healthcare professional. [place] [address] any warranty or liability for your use of this information.

## 2017-05-31 NOTE — PROGRESS NOTES
HISTORY OF PRESENT ILLNESS  Angelica Mata is a 74 y.o. male. He was seen for follow up on recent 3 ER visits, back to back. 05/18/ 05/22/ and 05/29[facility] to [facility] on 05/18,05/22 and 05/29/2017  We reviewed the active problem list, medication list, notes from last encounter, lab results, imaging, ER summary. He presented with fall on right side in bath tub, initially on 05/18/2017. He was unconscious and doesn't remember how long he stayed there. In ER, CT head and CT chest, Xray chest, EKG,CBC and CMP were normal. Alcohol level was high. He has h/o alcohol addiction. He was discharged with narcotics. Again on 05/22/2017, he was brought to ER as relative observed him with SOB. Again, xray chest, CTA chest,EKG, CBC,CMP normal..he was discharged with pain RX. Again he was presented to ER with chest pain and rt side shoulder pain. Xray chest, EKG, CTA chest, CBC,CMP all normal gain. Vitals stayed stable with O2 sat in 100%. He is taking his Norco as directed & without any side effects. He reports symptoms are improved. Review of Systems   Constitutional: Negative for chills, fever and malaise/fatigue. HENT: Negative for congestion, ear pain, sore throat and tinnitus. Eyes: Negative for blurred vision, double vision, pain and discharge. Respiratory: Negative for cough, shortness of breath and wheezing. Cardiovascular: Positive for chest pain. Negative for palpitations and leg swelling. Gastrointestinal: Negative for abdominal pain, blood in stool, constipation, diarrhea, nausea and vomiting. Genitourinary: Negative for dysuria, frequency, hematuria and urgency. Musculoskeletal: Positive for neck pain. Negative for back pain, joint pain and myalgias. Skin: Negative for rash. Neurological: Negative for dizziness, tremors, seizures and headaches. Endo/Heme/Allergies: Negative for polydipsia.  Does not bruise/bleed easily. Psychiatric/Behavioral: Negative for depression and substance abuse. The patient is not nervous/anxious. Physical Exam   Constitutional: He is oriented to person, place, and time. He appears well-developed and well-nourished. HENT:   Head: Normocephalic and atraumatic. Right Ear: External ear normal.   Mouth/Throat: Oropharynx is clear and moist. No oropharyngeal exudate. Eyes: Conjunctivae and EOM are normal. Pupils are equal, round, and reactive to light. No scleral icterus. Neck: Normal range of motion. Neck supple. No JVD present. Muscular tenderness present. No thyromegaly present. Cardiovascular: Normal rate, regular rhythm, normal heart sounds and intact distal pulses. No murmur heard. Pulmonary/Chest: Effort normal and breath sounds normal. He has no wheezes. He exhibits tenderness. Abdominal: Soft. Bowel sounds are normal. He exhibits no distension and no mass. Musculoskeletal: Normal range of motion. He exhibits no edema or tenderness. Lymphadenopathy:     He has no cervical adenopathy. Neurological: He is alert and oriented to person, place, and time. He has normal reflexes. No cranial nerve deficit. Skin: Skin is warm and dry. No rash noted. He is not diaphoretic. Psychiatric: He has a normal mood and affect. Nursing note and vitals reviewed. ASSESSMENT and PLAN  Mon was seen today for hospital follow up and dizziness. Diagnoses and all orders for this visit:    Hospital discharge follow-up    Alcohol intoxication, uncomplicated ([address]    Contusion of right chest wall, initial encounter  -     diclofenac (SOLARAZE) 3 % topical gel; Apply  to affected area two (2) times a day. -     etodolac (LODINE) 400 mg tablet;  Take 400 mg by mouth two (2) times a day.    had long discussion on stopping alcohol and maintaining balance due to his h/o neuropathy  Discussed chest PT and blowing balloons at home along with heating pad  Discussed lifestyle issues and health guidance given  Patient was given an after visit summary which includes diagnoses, vital signs, current medications, instructions and references & authorized prescriptions . Pt verbalized instructions I provided and expressed understanding of discussion that was held today. Follow-up Disposition:  Return if symptoms worsen or fail to improve.

## 2017-05-31 NOTE — PROGRESS NOTES
Chief Complaint   Patient presents with   [facility] Follow Up     ER Follow up. Falling in tub , side is tender     Dizziness     1. Have you been to the ER, urgent care clinic since your last visit? Yes , ER- [facility]. Hospitalized since your last visit? No    2. Have you seen or consulted any other health care providers outside of the [address] [place] [address] since your last visit? Include any pap smears or colon screening.  No

## 2017-06-01 RX ORDER — MONTELUKAST SODIUM 10 MG/1
TABLET ORAL
Qty: 30 TAB | Refills: 1 | Status: SHIPPED | OUTPATIENT
Start: 2017-06-01 | End: 2017-08-16 | Stop reason: SDUPTHER

## 2017-06-06 ENCOUNTER — TELEPHONE (OUTPATIENT)
Dept: FAMILY MEDICINE CLINIC | Age: 67
End: 2017-06-06

## 2017-06-07 NOTE — TELEPHONE ENCOUNTER
----- Message from Jayce Willett sent at 6/6/2017  9:19 PM EDT -----  Regarding: Dr. Catarina Schlatter,     Pt states someone need to contact his insurance company for approval for \"Diclofenac\". [facility] [phone]. Pt states the pharmacy states he needs approval prior to issuing the medication. Best contact number is [phone].     Thanks,  Gamal Srinivasan

## 2017-06-16 ENCOUNTER — TELEPHONE (OUTPATIENT)
Dept: FAMILY MEDICINE CLINIC | Age: 67
End: 2017-06-16

## 2017-06-19 ENCOUNTER — PATIENT OUTREACH (OUTPATIENT)
Dept: FAMILY MEDICINE CLINIC | Age: 67
End: 2017-06-19

## 2017-06-19 NOTE — PROGRESS NOTES
Transitions of care complete. Per EMR review, there have been no further ED/UC or hospital admissions within 30 days post discharge. Patient has attended follow-up appointment with PCP on 5/31/17. There has been no outreach from patient to this writer. Resolving current episode.

## 2017-07-14 RX ORDER — ATORVASTATIN CALCIUM 10 MG/1
TABLET, FILM COATED ORAL
Qty: 30 TAB | Refills: 0 | Status: SHIPPED | COMMUNITY
Start: 2017-07-14 | End: 2017-08-11 | Stop reason: SDUPTHER

## 2017-07-16 DIAGNOSIS — J06.9 VIRAL URI: ICD-10-CM

## 2017-07-16 RX ORDER — CROMOLYN SODIUM 40 MG/ML
SOLUTION/ DROPS OPHTHALMIC
Qty: 10 ML | Refills: 1 | Status: SHIPPED | OUTPATIENT
Start: 2017-07-16 | End: 2017-08-29 | Stop reason: SDUPTHER

## 2017-08-11 RX ORDER — ATORVASTATIN CALCIUM 10 MG/1
TABLET, FILM COATED ORAL
Qty: 30 TAB | Refills: 0 | Status: SHIPPED | COMMUNITY
Start: 2017-08-11 | End: 2017-09-14 | Stop reason: SDUPTHER

## 2017-08-12 RX ORDER — ATENOLOL 25 MG/1
TABLET ORAL
Qty: 30 TAB | Refills: 4 | Status: SHIPPED | COMMUNITY
Start: 2017-08-12 | End: 2017-12-18 | Stop reason: SDUPTHER

## 2017-08-16 DIAGNOSIS — J06.9 VIRAL URI: ICD-10-CM

## 2017-08-16 RX ORDER — MONTELUKAST SODIUM 10 MG/1
TABLET ORAL
Qty: 30 TAB | Refills: 1 | Status: SHIPPED | OUTPATIENT
Start: 2017-08-16 | End: 2017-09-11 | Stop reason: ALTCHOICE

## 2017-08-29 DIAGNOSIS — J06.9 VIRAL URI: ICD-10-CM

## 2017-08-29 RX ORDER — CROMOLYN SODIUM 40 MG/ML
SOLUTION/ DROPS OPHTHALMIC
Qty: 10 ML | Refills: 1 | Status: SHIPPED | OUTPATIENT
Start: 2017-08-29 | End: 2018-02-10 | Stop reason: SDUPTHER

## 2017-09-03 RX ORDER — CHOLECALCIFEROL (VITAMIN D3) 125 MCG
CAPSULE ORAL
Qty: 100 CAP | Refills: 0 | Status: SHIPPED | COMMUNITY
Start: 2017-09-03 | End: 2017-12-17 | Stop reason: SDUPTHER

## 2017-09-11 ENCOUNTER — OFFICE VISIT (OUTPATIENT)
Dept: FAMILY MEDICINE CLINIC | Age: 67
End: 2017-09-11

## 2017-09-11 VITALS
HEIGHT: 64 IN | SYSTOLIC BLOOD PRESSURE: 116 MMHG | OXYGEN SATURATION: 96 % | RESPIRATION RATE: 16 BRPM | HEART RATE: 60 BPM | WEIGHT: 123 LBS | DIASTOLIC BLOOD PRESSURE: 78 MMHG | BODY MASS INDEX: 21 KG/M2 | TEMPERATURE: 97.6 F

## 2017-09-11 DIAGNOSIS — Z23 ENCOUNTER FOR IMMUNIZATION: ICD-10-CM

## 2017-09-11 DIAGNOSIS — Z13.5 GLAUCOMA SCREENING: ICD-10-CM

## 2017-09-11 DIAGNOSIS — K14.8 TONGUE MASS: ICD-10-CM

## 2017-09-11 DIAGNOSIS — Z00.00 ROUTINE PHYSICAL EXAMINATION: Primary | ICD-10-CM

## 2017-09-11 NOTE — PATIENT INSTRUCTIONS

## 2017-09-11 NOTE — MR AVS SNAPSHOT
Visit Information Date & Time Provider Department Dept. Phone Encounter #  
 9/11/2017  9:40 AM Bing Lawrence  [facility] [phone] [id number] Upcoming Health Maintenance Date Due ZOSTER VACCINE AGE 60> 11/1/2011 GLAUCOMA SCREENING Q2Y 1/1/2017 INFLUENZA AGE 9 TO ADULT 8/1/2017 Pneumococcal 65+ Low/Medium Risk (2 of 2 - PCV13) 5/10/2018 MEDICARE YEARLY EXAM 9/12/2018 COLONOSCOPY 1/21/2020 DTaP/Tdap/Td series (2 - Td) 6/27/2024 Allergies as of 9/11/2017  Review Complete On: 9/11/2017 By: Jackie Rees LPN No Known Allergies Current Immunizations  Reviewed on 5/10/2017 Name Date Influenza High Dose Vaccine PF  Incomplete Influenza Vaccine 12/10/2015 Influenza Vaccine (Quad) PF 12/10/2015 Pneumococcal Polysaccharide (PPSV-23) 5/10/2017 Td 6/27/2014, 7/2/2012 Tdap 6/13/2013 Not reviewed this visit You Were Diagnosed With   
  
 Codes Comments Routine physical examination    -  Primary ICD-10-CM: Z00.00 ICD-9-CM: V70.0 Tongue mass     ICD-10-CM: R22.0 ICD-9-CM: 784.2 Glaucoma screening     ICD-10-CM: Z13.5 ICD-9-CM: V80.1 Encounter for immunization     ICD-10-CM: S75 ICD-9-CM: V03.89 Vitals BP Pulse Temp Resp Height(growth percentile) Weight(growth percentile) 116/78 (BP 1 Location: Right arm, BP Patient Position: Sitting) 60 97.6 °F (36.4 °C) (Oral) 16 5' 4\" (1.626 m) 123 lb (55.8 kg) SpO2 BMI Smoking Status 96% 21.11 kg/m2 Never Smoker Vitals History BMI and BSA Data Body Mass Index Body Surface Area  
 21.11 kg/m 2 1.59 m 2 Preferred Pharmacy Pharmacy Name Phone [id number] [place] [address] [phone] Your Updated Medication List  
  
   
This list is accurate as of: 9/11/17 10:27 AM.  Always use your most recent med list.  
  
  
  
  
 atenolol 25 mg tablet Commonly known as:  TENORMIN  
TAKE 1 TABLET BY MOUTH DAILY. atorvastatin 10 mg tablet Commonly known as:  LIPITOR  
TAKE 1 TABLET BY MOUTH EVERY DAY  
  
 cholecalciferol 5,000 unit capsule Commonly known as:  VITAMIN D3  
TAKE ONE CAPSULE BY MOUTH EVERY DAY  
  
 cromolyn 4 % ophthalmic solution Commonly known as:  OPTICROM  
PLACE 1 DROP IN BOTH EYES 4 TIMES A DAY  
  
 cyanocobalamin 500 mcg tablet Commonly known as:  VITAMIN B12  
TAKE 1 TAB BY MOUTH DAILY. We Performed the Following CBC WITH AUTOMATED DIFF [93865 CPT(R)] HEMOGLOBIN A1C WITH EAG [42056 CPT(R)] INFLUENZA VIRUS VACCINE, HIGH DOSE SEASONAL, PRESERVATIVE FREE [18451 CPT(R)] LIPID PANEL [05331 CPT(R)] METABOLIC PANEL, COMPREHENSIVE [69215 CPT(R)] CO IMMUNIZ ADMIN,1 SINGLE/COMB VAC/TOXOID A5423638 CPT(R)] PSA W/ REFLX FREE PSA [08798 CPT(R)] REFERRAL TO OPHTHALMOLOGY [REF57 Custom] Comments:  
 Please evaluate patient for gluacoma screening. REFERRAL TO ORAL MAXILLOFACIAL SURGERY [REF59 Custom] Comments:  
 Please evaluate patient for new oral lesion. TSH 3RD GENERATION [61943 CPT(R)] URINALYSIS W/ RFLX MICROSCOPIC [23146 CPT(R)] Referral Information Referral ID Referred By Referred To  
  
 [id number] [address], [address] [place] [address] Oral & Facial Surgery [address] [place] [address] Visits Status Start Date End Date 1 New Request 9/11/17 9/11/18 If your referral has a status of pending review or denied, additional information will be sent to support the outcome of this decision. Referral ID Referred By Referred To  
 [id number] Cezar Avalos [facility] [address] [facility], [address] [place] [address] Visits Status Start Date End Date 1 New Request 9/11/17 9/11/18  If your referral has a status of pending review or denied, additional information will be sent to support the outcome of this decision. Patient Instructions Well Visit, Over 72: Care Instructions Your Care Instructions Physical exams can help you stay healthy. Your doctor has checked your overall health and may have suggested ways to take good care of yourself. He or she also may have recommended tests. At home, you can help prevent illness with healthy eating, regular exercise, and other steps. Follow-up care is a key part of your treatment and safety. Be sure to make and go to all appointments, and call your doctor if you are having problems. It's also a good idea to know your test results and keep a list of the medicines you take. How can you care for yourself at home? · Reach and stay at a healthy weight. This will lower your risk for many problems, such as obesity, diabetes, heart disease, and high blood pressure. · Get at least 30 minutes of exercise on most days of the week. Walking is a good choice. You also may want to do other activities, such as running, swimming, cycling, or playing tennis or team sports. · Do not smoke. Smoking can make health problems worse. If you need help quitting, talk to your doctor about stop-smoking programs and medicines. These can increase your chances of quitting for good. · Protect your skin from too much sun. When you're outdoors from 10 a.m. to 4 p.m., stay in the shade or cover up with clothing and a hat with a wide brim. Wear sunglasses that block UV rays. Even when it's cloudy, put broad-spectrum sunscreen (SPF 30 or higher) on any exposed skin. · See a dentist one or two times a year for checkups and to have your teeth cleaned. · Wear a seat belt in the car. · Limit alcohol to 2 drinks a day for men and 1 drink a day for women. Too much alcohol can cause health problems. Follow your doctor's advice about when to have certain tests. These tests can spot problems early. For men and women · Cholesterol. Your doctor will tell you how often to have this done based on your overall health and other things that can increase your risk for heart attack and stroke. · Blood pressure. Have your blood pressure checked during a routine doctor visit. Your doctor will tell you how often to check your blood pressure based on your age, your blood pressure results, and other factors. · Diabetes. Ask your doctor whether you should have tests for diabetes. · Vision. Experts recommend that you have yearly exams for glaucoma and other age-related eye problems. · Hearing. Tell your doctor if you notice any change in your hearing. You can have tests to find out how well you hear. · Colon cancer tests. Keep having colon cancer tests as your doctor recommends. You can have one of several types of tests. · Heart attack and stroke risk. At least every 4 to 6 years, you should have your risk for heart attack and stroke assessed. Your doctor uses factors such as your age, blood pressure, cholesterol, and whether you smoke or have diabetes to show what your risk for a heart attack or stroke is over the next 10 years. · Osteoporosis. Talk to your doctor about whether you should have a bone density test to find out whether you have thinning bones. Also ask your doctor about whether you should take calcium and vitamin D supplements. For women · Pap test and pelvic exam. You may no longer need a Pap test. Talk with your doctor about whether to stop or continue to have Pap tests. · Breast exam and mammogram. Ask how often you should have a mammogram, which is an X-ray of your breasts. A mammogram can spot breast cancer before it can be felt and when it is easiest to treat. · Thyroid disease. Talk to your doctor about whether to have your thyroid checked as part of a regular physical exam. Women have an increased chance of a thyroid problem. For men · Prostate exam. Talk to your doctor about whether you should have a blood test (called a PSA test) for prostate cancer. Experts disagree on whether men should have this test. Some experts recommend that you discuss the benefits and risks of the test with your doctor. · Abdominal aortic aneurysm. Ask your doctor whether you should have a test to check for an aneurysm. You may need a test if you ever smoked or if your parent, brother, sister, or child has had an aneurysm. When should you call for help? Watch closely for changes in your health, and be sure to contact your doctor if you have any problems or symptoms that concern you. Where can you learn more? Go to http://bri-riley.info/. Enter Z306 in the search box to learn more about \"Well Visit, Over 65: Care Instructions. \" Current as of: July 19, 2016 Content Version: 11.3 © 0417-4057 [employer]. Care instructions adapted under license by [employer] (which disclaims liability or warranty for this information). If you have questions about a medical condition or this instruction, always ask your healthcare professional. [place] [address] any warranty or liability for your use of this information. Introducing [facility]! [employer] introduces NotesFirst patient portal. Now you can access parts of your medical record, email your doctor's office, and request medication refills online. 1. In your internet browser, go to [email or web address]. [email or web address]/Homeschooling Through the Ages 2. Click on the First Time User? Click Here link in the Sign In box. You will see the New Member Sign Up page. 3. Enter your NotesFirst Access Code exactly as it appears below. You will not need to use this code after youve completed the sign-up process. If you do not sign up before the expiration date, you must request a new code. · NotesFirst Access Code: [email or web address] Expires: 12/10/2017 10:27 AM 
 
4.  Enter the last four digits of your Social Security Number (xxxx) and Date of Birth (mm/dd/yyyy) as indicated and click Submit. You will be taken to the next sign-up page. 5. Create a iPAYst ID. This will be your iPAYst login ID and cannot be changed, so think of one that is secure and easy to remember. 6. Create a iPAYst password. You can change your password at any time. 7. Enter your Password Reset Question and Answer. This can be used at a later time if you forget your password. 8. Enter your e-mail address. You will receive e-mail notification when new information is available in [address] 9. Click Sign Up. You can now view and download portions of your medical record. 10. Click the Download Summary menu link to download a portable copy of your medical information. If you have questions, please visit the Frequently Asked Questions section of the iPAYst website. Remember, iPAYst is NOT to be used for urgent needs. For medical emergencies, dial 911. Now available from your iPhone and Android! Please provide this summary of care documentation to your next provider. Your primary care clinician is listed as Inell Leas. If you have any questions after today's visit, please call [phone].

## 2017-09-11 NOTE — PROGRESS NOTES
Chief Complaint   Patient presents with    Complete Physical     Fasting     Immunization/Injection     Influenza High dose           1. Have you been to the ER, urgent care clinic since your last visit? Hospitalized since your last visit? No    2. Have you seen or consulted any other health care providers outside of the [address] [place] [address] since your last visit? Include any pap smears or colon screening. No    Angelica QUINONES Edin Phoenix  is a 74 y.o.  male  who present for Influenza High dose  immunizations/injections. He/she denies any symptoms , reactions or allergies that would exclude them from being immunized today. Risks and adverse reactions were discussed and the VIS was given if applicable to them. All questions were addressed. He/She was observed for 10 min post injection. There were no reactions observed.     Morelia Holder LPN

## 2017-09-11 NOTE — PROGRESS NOTES
HISTORY OF PRESENT ILLNESS  Angelica Nguyễn is a 74 y.o. male. He was seen for annual physical exam.  HPI  Health Maintenance  Immunizations:     Influenza: will get this done today. Tetanus: up to date. Shingles: declined as not covered by insurance. Pneumonia: up to date. Cancer screening:     Prostate: reviewed guidelines, UTD, will do today. Colon: guidelines reviewed, UTD. Patient Care Team:  Anjel Spencer MD as PCP - General (Family Practice)  Dash Dixon RN as Ambulatory Care Navigator       The following sections were reviewed & updated as appropriate: PMH, PSH, FH, and SH. 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 5XXA    Vitamin D deficiency E55.9    Pulmonary nodule seen on imaging study R91.1    Colon cancer screening Z12.11    Tachycardia R00.0    Alcohol intoxication ([facility] [address] F10.129    Routine physical examination Z00.00    Osteoarthritis of knee, unilateral M17.10    Need for pneumococcal vaccination [address] discharge follow-up Z09    Contusion of right chest wall S20.211A    Tongue mass R22.0          Prior to Admission medications    Medication Sig Start Date End Date Taking? Authorizing Provider   cholecalciferol (VITAMIN D3) 5,000 unit capsule TAKE ONE CAPSULE BY MOUTH EVERY DAY 9/3/17  Yes Anjel Spencer MD   cromolyn (OPTICROM) 4 % ophthalmic solution PLACE 1 DROP IN BOTH EYES 4 TIMES A DAY 8/29/17  Yes Anjel Spencer MD   atenolol (TENORMIN) 25 mg tablet TAKE 1 TABLET BY MOUTH DAILY.  8/12/17  Yes Anjel Spencer MD   atorvastatin (LIPITOR) 10 mg tablet TAKE 1 TABLET BY MOUTH EVERY DAY 8/11/17  Yes Anjel Spencer MD   cyanocobalamin (VITAMIN B12) 500 mcg tablet TAKE 1 TAB BY MOUTH DAILY. 8/31/16  Yes Carly Alcaraz MD          No Known Allergies     Review of Systems   Constitutional: Negative for chills, fever and malaise/fatigue. HENT: Negative for congestion, ear pain, sore throat and tinnitus. Eyes: Negative for blurred vision, double vision, pain and discharge. Respiratory: Negative for cough, shortness of breath and wheezing. Cardiovascular: Negative for chest pain, palpitations and leg swelling. Gastrointestinal: Negative for abdominal pain, blood in stool, constipation, diarrhea, nausea and vomiting. Genitourinary: Negative for dysuria, frequency, hematuria and urgency. Musculoskeletal: Negative for back pain, joint pain and myalgias. Skin: Negative for rash. Neurological: Negative for dizziness, tremors, seizures and headaches. Endo/Heme/Allergies: Negative for polydipsia. Does not bruise/bleed easily. Psychiatric/Behavioral: Negative for depression and substance abuse. The patient is not nervous/anxious. Physical Exam   Constitutional: He is oriented to person, place, and time. He appears well-developed and well-nourished. HENT:   Head: Normocephalic and atraumatic. Right Ear: External ear normal.   Left Ear: External ear normal.   Nose: Nose normal.   Mouth/Throat: Oropharynx is clear and moist. No oropharyngeal exudate. Eyes: Conjunctivae and EOM are normal. Pupils are equal, round, and reactive to light. Neck: Normal range of motion. Neck supple. No thyromegaly present. Cardiovascular: Normal rate, regular rhythm, normal heart sounds and intact distal pulses. No murmur heard. Pulmonary/Chest: Effort normal and breath sounds normal. No respiratory distress. He has no wheezes. Abdominal: Soft. Bowel sounds are normal. He exhibits no distension and no mass. Genitourinary: Testes normal and penis normal. Right testis shows no mass. Left testis shows no mass. Musculoskeletal: Normal range of motion. He exhibits no edema or tenderness. Neurological: He is alert and oriented to person, place, and time. He has normal strength and normal reflexes. No sensory deficit. Cranial nerves 2-12 normal   Skin: Skin is warm and dry. No rash noted. Psychiatric: He has a normal mood and affect. His behavior is normal. Thought content normal.   Nursing note and vitals reviewed. ASSESSMENT and PLAN  Diagnoses and all orders for this visit:    1. Routine physical examination  -     CBC WITH AUTOMATED DIFF  -     METABOLIC PANEL, COMPREHENSIVE  -     LIPID PANEL  -     TSH 3RD GENERATION  -     URINALYSIS W/ RFLX MICROSCOPIC  -     HEMOGLOBIN A1C WITH EAG  -     PSA W/ REFLX FREE PSA    2. Tongue mass  -     REFERRAL TO ORAL MAXILLOFACIAL SURGERY    3. Glaucoma screening  -     REFERRAL TO OPHTHALMOLOGY    4. Encounter for immunization  -     Influenza virus vaccine (FLUZONE HIGH DOSE) 65 years and older (77617)  -     MS IMMUNIZ ADMIN,1 SINGLE/COMB VAC/TOXOID    Discussed lifestyle issues and health guidance given  Patient was given an after visit summary which includes diagnoses, vital signs, current medications, instructions and references & authorized prescriptions . Results of labs will be conveyed to patient, once available. Pt verbalized instructions I provided and expressed understanding of discussion that was held today. Follow-up Disposition:  Return in about 4 months (around 1/11/2018) for fasting, follow up.

## 2017-09-11 NOTE — LETTER
9/14/2017 3:33 PM 
 
Mr. Ireland  [address] [facility] [address] Dear Marcos Alba: 
 
Please find your most recent results below. Resulted Orders CBC WITH AUTOMATED DIFF Result Value Ref Range WBC 6.1 3.4 - 10.8 x10E3/uL  
 RBC 4.84 4.14 - 5.80 x10E6/uL HGB 14.7 12.6 - 17.7 g/dL HCT 43.5 37.5 - 51.0 % MCV 90 79 - 97 fL  
 MCH 30.4 26.6 - 33.0 pg  
 MCHC 33.8 31.5 - 35.7 g/dL  
 RDW 13.3 12.3 - 15.4 % PLATELET 411 514 - 439 x10E3/uL NEUTROPHILS 66 % Lymphocytes 22 % MONOCYTES 8 % EOSINOPHILS 3 % BASOPHILS 1 %  
 ABS. NEUTROPHILS 4.1 1.4 - 7.0 x10E3/uL Abs Lymphocytes 1.3 0.7 - 3.1 x10E3/uL  
 ABS. MONOCYTES 0.5 0.1 - 0.9 x10E3/uL  
 ABS. EOSINOPHILS 0.2 0.0 - 0.4 x10E3/uL  
 ABS. BASOPHILS 0.0 0.0 - 0.2 x10E3/uL IMMATURE GRANULOCYTES 0 %  
 ABS. IMM. GRANS. 0.0 0.0 - 0.1 x10E3/uL Narrative Performed at:  [address]  [id number] : Ebony Hawley MD, Phone:  [phone] METABOLIC PANEL, COMPREHENSIVE Result Value Ref Range Glucose 92 65 - 99 mg/dL BUN 6 (L) 8 - 27 mg/dL Creatinine 0.98 0.76 - 1.27 mg/dL GFR est non-AA 81 >59 mL/min/1.73 GFR est AA 93 >59 mL/min/1.73  
 BUN/Creatinine ratio 6 (L) 10 - 24 Sodium 140 134 - 144 mmol/L Potassium 4.2 3.5 - 5.2 mmol/L Chloride 100 96 - 106 mmol/L  
 CO2 25 18 - 29 mmol/L Calcium 9.4 8.6 - 10.2 mg/dL Protein, total 7.6 6.0 - 8.5 g/dL Albumin 4.7 3.6 - 4.8 g/dL GLOBULIN, TOTAL 2.9 1.5 - 4.5 g/dL A-G Ratio 1.6 1.2 - 2.2 Bilirubin, total 0.7 0.0 - 1.2 mg/dL Alk. phosphatase 84 39 - 117 IU/L  
 AST (SGOT) 25 0 - 40 IU/L  
 ALT (SGPT) 37 0 - 44 IU/L Narrative Performed at:  [address]  [id number] : Ebony Hawley MD, Phone:  [phone] LIPID PANEL Result Value Ref Range Cholesterol, total 106 100 - 199 mg/dL Triglyceride 170 (H) 0 - 149 mg/dL HDL Cholesterol 30 (L) >39 mg/dL VLDL, calculated 34 5 - 40 mg/dL LDL, calculated 42 0 - 99 mg/dL Narrative Performed at:  [address]  [id number] : Cathi Miller MD, Phone:  [phone] TSH 3RD GENERATION Result Value Ref Range TSH 3.100 0.450 - 4.500 uIU/mL Narrative Performed at:  [address]  [id number] : Cathi Miller MD, Phone:  [phone] URINALYSIS W/ RFLX MICROSCOPIC Result Value Ref Range Specific Gravity 1.008 1.005 - 1.030  
 pH (UA) 6.5 5.0 - 7.5 Color Yellow Yellow Appearance Clear Clear Leukocyte Esterase Negative Negative Protein 1+ (A) Negative/Trace Glucose Negative Negative Ketone Negative Negative Blood Negative Negative Bilirubin Negative Negative Urobilinogen 0.2 0.2 - 1.0 mg/dL Nitrites Negative Negative Microscopic Examination See additional order Comment:  
   Microscopic was indicated and was performed. Narrative Performed at:  [address]  [id number] : Cathi Miller MD, Phone:  [phone] HEMOGLOBIN A1C WITH EAG Result Value Ref Range Hemoglobin A1c 5.1 4.8 - 5.6 % Comment:  
            Pre-diabetes: 5.7 - 6.4 Diabetes: >6.4 Glycemic control for adults with diabetes: <7.0 Estimated average glucose 100 mg/dL Narrative Performed at:  [address]  [id number] : Cathi Miller MD, Phone:  [phone] PSA W/ REFLX FREE PSA Result Value Ref Range Prostate Specific Ag 0.7 0.0 - 4.0 ng/mL Comment:  
   Roche ECLIA methodology.  
According to the American Urological Association, Serum PSA should 
decrease and remain at undetectable levels after radical 
 prostatectomy. The AUA defines biochemical recurrence as an initial 
PSA value 0.2 ng/mL or greater followed by a subsequent confirmatory PSA value 0.2 ng/mL or greater. Values obtained with different assay methods or kits cannot be used 
interchangeably. Results cannot be interpreted as absolute evidence 
of the presence or absence of malignant disease. Reflex Criteria Comment Comment:  
   The percent free PSA is performed on a reflex basis only when the 
total PSA is between 4.0 and 10.0 ng/mL. Narrative Performed at:  [address]  [id number] : Sravan George MD, Phone:  [phone] MICROSCOPIC EXAMINATION Result Value Ref Range WBC None seen 0 - 5 /hpf  
 RBC 0-2 0 - 2 /hpf Epithelial cells 0-10 0 - 10 /hpf Casts None seen None seen /lpf Mucus Present Not Estab. Bacteria Few None seen/Few Narrative Performed at:  [address]  [id number] : Sravan George MD, Phone:  [phone] CVD REPORT Result Value Ref Range INTERPRETATION Note Comment:  
   Supplement report is available. Narrative Performed at:  [address] [place] [address]  [id number] : Michael Montague PhD, Phone:  [phone] RECOMMENDATIONS: 
All results including blood count, kidney and liver function, thyroid profile, cholesterol, average blood sugar ( diabetes screening), urine, prostate cancer screening  are normal  
 
Please call me if you have any questions: [phone] Sincerely, Raymond Aragon MD

## 2017-09-12 LAB
ALBUMIN SERPL-MCNC: 4.7 G/DL (ref 3.6–4.8)
ALBUMIN/GLOB SERPL: 1.6 {RATIO} (ref 1.2–2.2)
ALP SERPL-CCNC: 84 IU/L (ref 39–117)
ALT SERPL-CCNC: 37 IU/L (ref 0–44)
APPEARANCE UR: CLEAR
AST SERPL-CCNC: 25 IU/L (ref 0–40)
BACTERIA #/AREA URNS HPF: NORMAL /[HPF]
BASOPHILS # BLD AUTO: 0 X10E3/UL (ref 0–0.2)
BASOPHILS NFR BLD AUTO: 1 %
BILIRUB SERPL-MCNC: 0.7 MG/DL (ref 0–1.2)
BILIRUB UR QL STRIP: NEGATIVE
BUN SERPL-MCNC: 6 MG/DL (ref 8–27)
BUN/CREAT SERPL: 6 (ref 10–24)
CALCIUM SERPL-MCNC: 9.4 MG/DL (ref 8.6–10.2)
CASTS URNS QL MICRO: NORMAL /LPF
CHLORIDE SERPL-SCNC: 100 MMOL/L (ref 96–106)
CHOLEST SERPL-MCNC: 106 MG/DL (ref 100–199)
CO2 SERPL-SCNC: 25 MMOL/L (ref 18–29)
COLOR UR: YELLOW
CREAT SERPL-MCNC: 0.98 MG/DL (ref 0.76–1.27)
EOSINOPHIL # BLD AUTO: 0.2 X10E3/UL (ref 0–0.4)
EOSINOPHIL NFR BLD AUTO: 3 %
EPI CELLS #/AREA URNS HPF: NORMAL /HPF
ERYTHROCYTE [DISTWIDTH] IN BLOOD BY AUTOMATED COUNT: 13.3 % (ref 12.3–15.4)
EST. AVERAGE GLUCOSE BLD GHB EST-MCNC: 100 MG/DL
GLOBULIN SER CALC-MCNC: 2.9 G/DL (ref 1.5–4.5)
GLUCOSE SERPL-MCNC: 92 MG/DL (ref 65–99)
GLUCOSE UR QL: NEGATIVE
HBA1C MFR BLD: 5.1 % (ref 4.8–5.6)
HCT VFR BLD AUTO: 43.5 % (ref 37.5–51)
HDLC SERPL-MCNC: 30 MG/DL
HGB BLD-MCNC: 14.7 G/DL (ref 12.6–17.7)
HGB UR QL STRIP: NEGATIVE
IMM GRANULOCYTES # BLD: 0 X10E3/UL (ref 0–0.1)
IMM GRANULOCYTES NFR BLD: 0 %
INTERPRETATION, 910389: NORMAL
KETONES UR QL STRIP: NEGATIVE
LDLC SERPL CALC-MCNC: 42 MG/DL (ref 0–99)
LEUKOCYTE ESTERASE UR QL STRIP: NEGATIVE
LYMPHOCYTES # BLD AUTO: 1.3 X10E3/UL (ref 0.7–3.1)
LYMPHOCYTES NFR BLD AUTO: 22 %
MCH RBC QN AUTO: 30.4 PG (ref 26.6–33)
MCHC RBC AUTO-ENTMCNC: 33.8 G/DL (ref 31.5–35.7)
MCV RBC AUTO: 90 FL (ref 79–97)
MICRO URNS: ABNORMAL
MONOCYTES # BLD AUTO: 0.5 X10E3/UL (ref 0.1–0.9)
MONOCYTES NFR BLD AUTO: 8 %
MUCOUS THREADS URNS QL MICRO: PRESENT
NEUTROPHILS # BLD AUTO: 4.1 X10E3/UL (ref 1.4–7)
NEUTROPHILS NFR BLD AUTO: 66 %
NITRITE UR QL STRIP: NEGATIVE
PH UR STRIP: 6.5 [PH] (ref 5–7.5)
PLATELET # BLD AUTO: 205 X10E3/UL (ref 150–379)
POTASSIUM SERPL-SCNC: 4.2 MMOL/L (ref 3.5–5.2)
PROT SERPL-MCNC: 7.6 G/DL (ref 6–8.5)
PROT UR QL STRIP: ABNORMAL
PSA SERPL-MCNC: 0.7 NG/ML (ref 0–4)
RBC # BLD AUTO: 4.84 X10E6/UL (ref 4.14–5.8)
RBC #/AREA URNS HPF: NORMAL /HPF
REFLEX CRITERIA: NORMAL
SODIUM SERPL-SCNC: 140 MMOL/L (ref 134–144)
SP GR UR: 1.01 (ref 1–1.03)
TRIGL SERPL-MCNC: 170 MG/DL (ref 0–149)
TSH SERPL DL<=0.005 MIU/L-ACNC: 3.1 UIU/ML (ref 0.45–4.5)
UROBILINOGEN UR STRIP-MCNC: 0.2 MG/DL (ref 0.2–1)
VLDLC SERPL CALC-MCNC: 34 MG/DL (ref 5–40)
WBC # BLD AUTO: 6.1 X10E3/UL (ref 3.4–10.8)
WBC #/AREA URNS HPF: NORMAL /HPF

## 2017-09-12 NOTE — PROGRESS NOTES
Please inform patient or son,  As discussed all results including blood count, kidney and liver function, thyroid profile, cholesterol, average blood sugar ( diabetes screening), urine, prostate cancer screening  are normal

## 2017-09-12 NOTE — PROGRESS NOTES
LM for patient or [facility] (has total access to info per HIPPA) to return call

## 2017-09-14 RX ORDER — ATORVASTATIN CALCIUM 10 MG/1
TABLET, FILM COATED ORAL
Qty: 30 TAB | Refills: 0 | Status: SHIPPED | OUTPATIENT
Start: 2017-09-14 | End: 2017-10-17 | Stop reason: SDUPTHER

## 2017-09-19 ENCOUNTER — TELEPHONE (OUTPATIENT)
Dept: FAMILY MEDICINE CLINIC | Age: 67
End: 2017-09-19

## 2017-09-19 DIAGNOSIS — K14.8 TONGUE MASS: Primary | ICD-10-CM

## 2017-09-19 NOTE — TELEPHONE ENCOUNTER
Seth ALBRIGHT    - [phone]     -  Son stated the eye doctor patient was referred to is not taking any new patients-  Please advise-

## 2017-09-19 NOTE — TELEPHONE ENCOUNTER
Spoke with Pt's son Seth([employer] verified). He states that he had tried calling the Oral Surgeon(Dr. Charla Pak's ) office, but they are not accepting new patients. Will like another name that they could call please.

## 2017-09-20 NOTE — TELEPHONE ENCOUNTER
Attempted to call pt's son Christopher Quintana) with new Oral surgeon's information, unsuccessful, left him a voicemail message to call office back.

## 2017-09-21 NOTE — TELEPHONE ENCOUNTER
I have called Pt's son(Seth) and given information on Dr. Napoleon Britton. He verbalized understanding to call and make appt.

## 2017-10-17 DIAGNOSIS — J06.9 VIRAL URI: ICD-10-CM

## 2017-10-17 RX ORDER — ATORVASTATIN CALCIUM 10 MG/1
TABLET, FILM COATED ORAL
Qty: 30 TAB | Refills: 0 | Status: SHIPPED | OUTPATIENT
Start: 2017-10-17 | End: 2017-11-19 | Stop reason: SDUPTHER

## 2017-10-17 RX ORDER — MONTELUKAST SODIUM 10 MG/1
TABLET ORAL
Qty: 30 TAB | Refills: 1 | Status: SHIPPED | OUTPATIENT
Start: 2017-10-17 | End: 2017-11-05 | Stop reason: SDUPTHER

## 2017-10-25 ENCOUNTER — OFFICE VISIT (OUTPATIENT)
Dept: SURGERY | Age: 67
End: 2017-10-25

## 2017-10-25 VITALS
TEMPERATURE: 97.8 F | SYSTOLIC BLOOD PRESSURE: 120 MMHG | OXYGEN SATURATION: 98 % | WEIGHT: 126 LBS | HEIGHT: 64 IN | DIASTOLIC BLOOD PRESSURE: 80 MMHG | BODY MASS INDEX: 21.51 KG/M2 | RESPIRATION RATE: 16 BRPM | HEART RATE: 70 BPM

## 2017-10-25 DIAGNOSIS — K14.8 TONGUE MASS: Primary | ICD-10-CM

## 2017-10-25 NOTE — PROGRESS NOTES
Surgery Consult    Subjective: Rob Dhaliwal is a 74 y.o.  male who was referred by Dr Dolly Martin for evaluation of a lesion at the base of his tongue, towards the left side. The pt had a male  present with him to interpret for him. The states that the pt denies it being painful at the present time. They are unsure if it is even present. The pt does not experience any pain when eating, nor is it tender to touch. Chief Complaint   Patient presents with    Mass     lesion at base of tongue       Patient Active Problem List    Diagnosis Date Noted    Tongue mass 09/11/2017   [facility] discharge follow-up 05/31/2017    Contusion of right chest wall 05/31/2017    Need for pneumococcal vaccination 05/10/2017    Routine physical examination 04/08/2015    Osteoarthritis of knee, unilateral 04/08/2015    Tachycardia 02/13/2015    Alcohol intoxication ([facility] [address] 02/13/2015    Pulmonary nodule seen on imaging study 12/10/2014    Colon cancer screening 12/10/2014    Vitamin D deficiency 05/09/2014    Acute back pain 05/05/2014    Lumbar sprain 05/05/2014    Hyperlipidemia with target LDL less than 130 01/08/2014    Hypertriglyceridemia 01/07/2014    Sensory ataxia 11/07/2013    Late effect of stroke 09/10/2013    Gait disturbance 09/10/2013    Vitamin B12 deficiency 08/08/2013     Class: Chronic    Essential hypertension 08/08/2013     Class: Chronic    Numbness and tingling of both legs 08/08/2013     Class: Chronic     Past Medical History:   Diagnosis Date    Acid indigestion     B12 deficiency 08/08/13    level 208    Hypercholesterolemia     Hypertension     Stroke [facility])       History reviewed. No pertinent surgical history. Social History   Substance Use Topics    Smoking status: Never Smoker    Smokeless tobacco: Current User     Types: Snuff    Alcohol use No      Comment: Social.  Patient currently is not drinking for 4 months       History reviewed.  No pertinent family history. Current Outpatient Prescriptions   Medication Sig    atorvastatin (LIPITOR) 10 mg tablet TAKE 1 TABLET BY MOUTH EVERY DAY    montelukast (SINGULAIR) 10 mg tablet TAKE 1 TABLET BY MOUTH EVERY DAY    cholecalciferol (VITAMIN D3) 5,000 unit capsule TAKE ONE CAPSULE BY MOUTH EVERY DAY    cromolyn (OPTICROM) 4 % ophthalmic solution PLACE 1 DROP IN BOTH EYES 4 TIMES A DAY    atenolol (TENORMIN) 25 mg tablet TAKE 1 TABLET BY MOUTH DAILY.  cyanocobalamin (VITAMIN B12) 500 mcg tablet TAKE 1 TAB BY MOUTH DAILY. No current facility-administered medications for this visit. No Known Allergies     Review of Systems:    A comprehensive review of systems was negative except for that written in the History of Present Illness. Objective:     Visit Vitals    /80 (BP 1 Location: Left arm, BP Patient Position: Sitting)    Pulse 70    Temp 97.8 °F (36.6 °C) (Oral)    Resp 16    Ht 5' 4\" (1.626 m)    Wt 126 lb (57.2 kg)    SpO2 98%    BMI 21.63 kg/m2         Physical Exam:  General appearance: alert, cooperative, no distress, appears stated age  Head: Normocephalic, without obvious abnormality, atraumatic  Neurologic: Grossly normal  Mouth: Upon visual examination and bimanual palpation, could not feel anything on the mobile tongue, floor of mouth, base of tongue. There is no cervical adenopathy on either side. Assessment:       ICD-10-CM ICD-9-CM    1. Tongue mass R22.0 784. 2          Plan:     Pt will f/u in one month to reevaluate the tongue. Written by nuris Rodríguez, as dictated by Chandler Siemens Brandon Sol, MD.    Total face to face time with patient: 6 minutes. Greater than 50% of the time was spent in counseling. Signed By: Chandler Siemens Brandon Sol, MD     October 25, 2017

## 2017-10-25 NOTE — MR AVS SNAPSHOT
Visit Information Date & Time Provider Department Dept. Phone Encounter #  
 10/25/2017  1:40 PM Zayra Brooks [phone] [id number] Your Appointments 11/29/2017  9:40 AM  
ESTABLISHED PATIENT with MD Zayra Brooks [phone] ([address]) Appt Note: EP; 1mo. F/U w/Rico; Collinilla Mark; 10/25/17  
 [address] [facility] [address]  
[address] [address]  
  
    
 1/2/2018 10:40 AM  
ROUTINE CARE with Candice Bacon MD  
[facility]) Appt Note: FOLLOW UP VISIT  
 [phone]  
[phone]  
  
   
 [address] [phone] Upcoming Health Maintenance Date Due  
 GLAUCOMA SCREENING Q2Y 1/1/2017 Pneumococcal 65+ Low/Medium Risk (2 of 2 - PCV13) 5/10/2018 MEDICARE YEARLY EXAM 9/12/2018 COLONOSCOPY 1/21/2020 DTaP/Tdap/Td series (2 - Td) 6/27/2024 Allergies as of 10/25/2017  Review Complete On: 10/25/2017 By: Brendan Miller MD  
 No Known Allergies Current Immunizations  Reviewed on 9/11/2017 Name Date Influenza High Dose Vaccine PF 9/11/2017 Influenza Vaccine 12/10/2015 Influenza Vaccine (Quad) PF 12/10/2015 Pneumococcal Polysaccharide (PPSV-23) 5/10/2017 Td 6/27/2014, 7/2/2012 Tdap 6/13/2013 Not reviewed this visit You Were Diagnosed With   
  
 Codes Comments Tongue mass    -  Primary ICD-10-CM: R22.0 ICD-9-CM: 834. 2 Vitals BP Pulse Temp Resp Height(growth percentile) Weight(growth percentile) 120/80 (BP 1 Location: Left arm, BP Patient Position: Sitting) 70 97.8 °F (36.6 °C) (Oral) 16 5' 4\" (1.626 m) 126 lb (57.2 kg) SpO2 BMI Smoking Status 98% 21.63 kg/m2 Never Smoker BMI and BSA Data  Body Mass Index Body Surface Area  
 21.63 kg/m 2 1.61 m 2  
  
 Preferred Pharmacy Pharmacy Name Phone [id number] [phone] Your Updated Medication List  
  
   
This list is accurate as of: 10/25/17  1:44 PM.  Always use your most recent med list.  
  
  
  
  
 atenolol 25 mg tablet Commonly known as:  TENORMIN  
TAKE 1 TABLET BY MOUTH DAILY. atorvastatin 10 mg tablet Commonly known as:  LIPITOR  
TAKE 1 TABLET BY MOUTH EVERY DAY  
  
 cholecalciferol 5,000 unit capsule Commonly known as:  VITAMIN D3  
TAKE ONE CAPSULE BY MOUTH EVERY DAY  
  
 cromolyn 4 % ophthalmic solution Commonly known as:  OPTICROM  
PLACE 1 DROP IN BOTH EYES 4 TIMES A DAY  
  
 cyanocobalamin 500 mcg tablet Commonly known as:  VITAMIN B12  
TAKE 1 TAB BY MOUTH DAILY. montelukast 10 mg tablet Commonly known as:  SINGULAIR  
TAKE 1 TABLET BY MOUTH EVERY DAY Introducing [facility]! Ben M[employer] introduces [employer] patient portal. Now you can access parts of your medical record, email your doctor's office, and request medication refills online. 1. In your internet browser, go to [email or web address] 2. Click on the First Time User? Click Here link in the Sign In box. You will see the New Member Sign Up page. 3. Enter your [employer] Access Code exactly as it appears below. You will not need to use this code after youve completed the sign-up process. If you do not sign up before the expiration date, you must request a new code. · [employer] Access Code: [email or web address] Expires: 12/10/2017 10:27 AM 
 
4. Enter the last four digits of your Social Security Number (xxxx) and Date of Birth (mm/dd/yyyy) as indicated and click Submit. You will be taken to the next sign-up page. 5. Create a [employer] ID. This will be your [employer] login ID and cannot be changed, so think of one that is secure and easy to remember. 6. Create a [employer] password. You can change your password at any time. 7. Enter your Password Reset Question and Answer. This can be used at a later time if you forget your password. 8. Enter your e-mail address. You will receive e-mail notification when new information is available in [address] 9. Click Sign Up. You can now view and download portions of your medical record. 10. Click the Download Summary menu link to download a portable copy of your medical information. If you have questions, please visit the Frequently Asked Questions section of the [employer] website. Remember, [employer] is NOT to be used for urgent needs. For medical emergencies, dial 911. Now available from your iPhone and Android! Please provide this summary of care documentation to your next provider. Your primary care clinician is listed as Sergio Koo. If you have any questions after today's visit, please call [phone].

## 2017-10-25 NOTE — PROGRESS NOTES
1. Have you been to the ER, urgent care clinic since your last visit? Hospitalized since your last visit? No    2. Have you seen or consulted any other health care providers outside of the [address] [place] [address] since your last visit? Include any pap smears or colon screening.  No

## 2017-11-04 DIAGNOSIS — J06.9 VIRAL URI: ICD-10-CM

## 2017-11-05 RX ORDER — MONTELUKAST SODIUM 10 MG/1
TABLET ORAL
Qty: 30 TAB | Refills: 1 | Status: SHIPPED | OUTPATIENT
Start: 2017-11-05 | End: 2018-02-10 | Stop reason: SDUPTHER

## 2017-11-19 RX ORDER — ATORVASTATIN CALCIUM 10 MG/1
TABLET, FILM COATED ORAL
Qty: 30 TAB | Refills: 0 | Status: SHIPPED | COMMUNITY
Start: 2017-11-19 | End: 2017-12-17 | Stop reason: SDUPTHER

## 2017-12-17 RX ORDER — ATORVASTATIN CALCIUM 10 MG/1
TABLET, FILM COATED ORAL
Qty: 30 TAB | Refills: 0 | Status: SHIPPED | OUTPATIENT
Start: 2017-12-17 | End: 2018-01-13 | Stop reason: SDUPTHER

## 2017-12-17 RX ORDER — CHOLECALCIFEROL (VITAMIN D3) 125 MCG
CAPSULE ORAL
Qty: 100 CAP | Refills: 0 | Status: SHIPPED | COMMUNITY
Start: 2017-12-17 | End: 2018-04-23 | Stop reason: SDUPTHER

## 2017-12-18 RX ORDER — ATENOLOL 25 MG/1
TABLET ORAL
Qty: 30 TAB | Refills: 4 | Status: SHIPPED | OUTPATIENT
Start: 2017-12-18 | End: 2018-08-01 | Stop reason: SDUPTHER

## 2018-01-13 RX ORDER — ATORVASTATIN CALCIUM 10 MG/1
TABLET, FILM COATED ORAL
Qty: 30 TAB | Refills: 0 | Status: SHIPPED | OUTPATIENT
Start: 2018-01-13 | End: 2018-02-14 | Stop reason: SDUPTHER

## 2018-02-09 DIAGNOSIS — J06.9 VIRAL URI: ICD-10-CM

## 2018-02-10 DIAGNOSIS — J06.9 VIRAL URI: ICD-10-CM

## 2018-02-10 RX ORDER — CROMOLYN SODIUM 40 MG/ML
SOLUTION/ DROPS OPHTHALMIC
Qty: 10 ML | Refills: 1 | Status: SHIPPED | COMMUNITY
Start: 2018-02-10 | End: 2018-04-02 | Stop reason: SDUPTHER

## 2018-02-10 RX ORDER — MONTELUKAST SODIUM 10 MG/1
TABLET ORAL
Qty: 30 TAB | Refills: 1 | Status: SHIPPED | OUTPATIENT
Start: 2018-02-10 | End: 2018-04-05 | Stop reason: ALTCHOICE

## 2018-02-14 RX ORDER — ATORVASTATIN CALCIUM 10 MG/1
TABLET, FILM COATED ORAL
Qty: 30 TAB | Refills: 0 | Status: SHIPPED | OUTPATIENT
Start: 2018-02-14 | End: 2018-03-23 | Stop reason: SDUPTHER

## 2018-04-02 DIAGNOSIS — J06.9 VIRAL URI: ICD-10-CM

## 2018-04-02 RX ORDER — CROMOLYN SODIUM 40 MG/ML
SOLUTION/ DROPS OPHTHALMIC
Qty: 10 ML | Refills: 1 | Status: SHIPPED | OUTPATIENT
Start: 2018-04-02 | End: 2018-07-17 | Stop reason: SDUPTHER

## 2018-04-05 ENCOUNTER — OFFICE VISIT (OUTPATIENT)
Dept: FAMILY MEDICINE CLINIC | Age: 68
End: 2018-04-05

## 2018-04-05 VITALS
DIASTOLIC BLOOD PRESSURE: 80 MMHG | WEIGHT: 121 LBS | RESPIRATION RATE: 14 BRPM | BODY MASS INDEX: 20.66 KG/M2 | OXYGEN SATURATION: 98 % | HEART RATE: 64 BPM | SYSTOLIC BLOOD PRESSURE: 118 MMHG | HEIGHT: 64 IN | TEMPERATURE: 97.6 F

## 2018-04-05 DIAGNOSIS — E78.1 HYPERTRIGLYCERIDEMIA: ICD-10-CM

## 2018-04-05 DIAGNOSIS — I10 ESSENTIAL HYPERTENSION: Primary | ICD-10-CM

## 2018-04-05 DIAGNOSIS — E53.8 VITAMIN B12 DEFICIENCY: Chronic | ICD-10-CM

## 2018-04-05 DIAGNOSIS — I69.30 LATE EFFECT OF STROKE: ICD-10-CM

## 2018-04-05 DIAGNOSIS — E78.5 HYPERLIPIDEMIA WITH TARGET LDL LESS THAN 130: ICD-10-CM

## 2018-04-05 DIAGNOSIS — E55.9 VITAMIN D DEFICIENCY: ICD-10-CM

## 2018-04-05 DIAGNOSIS — R27.8 SENSORY ATAXIA: ICD-10-CM

## 2018-04-05 DIAGNOSIS — K21.9 GASTROESOPHAGEAL REFLUX DISEASE WITHOUT ESOPHAGITIS: ICD-10-CM

## 2018-04-05 RX ORDER — PHENOL/SODIUM PHENOLATE
20 AEROSOL, SPRAY (ML) MUCOUS MEMBRANE DAILY
Qty: 30 TAB | Refills: 2 | Status: SHIPPED | OUTPATIENT
Start: 2018-04-05 | End: 2018-07-10 | Stop reason: SDUPTHER

## 2018-04-05 NOTE — MR AVS SNAPSHOT
[address] 
[phone] Patient: Angelica Rosales MRN: [id number] FSQ:3/4/1927 Visit Information Date & Time Provider Department Dept. Phone Encounter #  
 4/5/2018 10:40 AM Ricardo Medina  [facility] [phone] [id number] Follow-up Instructions Return in about 4 months (around 8/5/2018) for fasting, follow up. Upcoming Health Maintenance Date Due  
 GLAUCOMA SCREENING Q2Y 1/1/2017 Pneumococcal 65+ Low/Medium Risk (2 of 2 - PCV13) 5/10/2018 COLONOSCOPY 1/21/2020 DTaP/Tdap/Td series (2 - Td) 6/27/2024 Allergies as of 4/5/2018  Review Complete On: 4/5/2018 By: Ricardo Medina MD  
 No Known Allergies Current Immunizations  Reviewed on 9/11/2017 Name Date Influenza High Dose Vaccine PF 9/11/2017 Influenza Vaccine 12/10/2015 Influenza Vaccine (Quad) PF 12/10/2015 Pneumococcal Polysaccharide (PPSV-23) 5/10/2017 Td 6/27/2014, 7/2/2012 Tdap 6/13/2013 Not reviewed this visit You Were Diagnosed With   
  
 Codes Comments Essential hypertension    -  Primary ICD-10-CM: I10 
ICD-9-CM: 401.9 Hypertriglyceridemia     ICD-10-CM: E78.1 ICD-9-CM: 272.1 Hyperlipidemia with target LDL less than 130     ICD-10-CM: E78.5 ICD-9-CM: 272.4 Sensory ataxia     ICD-10-CM: R27.8 ICD-9-CM: 250. 3 Vitamin D deficiency     ICD-10-CM: E55.9 ICD-9-CM: 268.9 Vitamin B12 deficiency     ICD-10-CM: E53.8 ICD-9-CM: 266.2 Gastroesophageal reflux disease without esophagitis     ICD-10-CM: K21.9 ICD-9-CM: 530.81 Vitals BP Pulse Temp Resp Height(growth percentile) Weight(growth percentile) 118/80 (BP 1 Location: Left arm, BP Patient Position: Sitting) 64 97.6 °F (36.4 °C) (Oral) 14 5' 4\" (1.626 m) 121 lb (54.9 kg) SpO2 BMI Smoking Status 98% 20.77 kg/m2 Never Smoker Vitals History BMI and BSA Data Body Mass Index Body Surface Area 20.77 kg/m 2 1.57 m 2 Preferred Pharmacy Pharmacy Name Phone [facility]Y #[id number]- [address] [facility] [phone] Your Updated Medication List  
  
   
This list is accurate as of 4/5/18 11:11 AM.  Always use your most recent med list.  
  
  
  
  
 atenolol 25 mg tablet Commonly known as:  TENORMIN  
TAKE 1 TABLET BY MOUTH DAILY. atorvastatin 10 mg tablet Commonly known as:  LIPITOR  
TAKE 1 TABLET BY MOUTH EVERY DAY  
  
 cholecalciferol 5,000 unit capsule Commonly known as:  VITAMIN D3  
TAKE ONE CAPSULE BY MOUTH EVERY DAY  
  
 cromolyn 4 % ophthalmic solution Commonly known as:  OPTICROM  
PLACE 1 DROP IN BOTH EYES 4 TIMES A DAY  
  
 cyanocobalamin 500 mcg tablet Commonly known as:  VITAMIN B12  
TAKE 1 TAB BY MOUTH DAILY. Omeprazole delayed release 20 mg tablet Commonly known as:  PRILOSEC D/R Take 1 Tab by mouth daily. Prescriptions Sent to Pharmacy Refills Omeprazole delayed release (PRILOSEC D/R) 20 mg tablet 2 Sig: Take 1 Tab by mouth daily. Class: Normal  
 Pharmacy: [facility]/pharmacy #[id number]- [address] [facility] Ph #: [phone] Route: Oral  
  
We Performed the Following LIPID PANEL [31252 CPT(R)] METABOLIC PANEL, COMPREHENSIVE [85618 CPT(R)] REFERRAL TO NEUROPSYCHOLOGY [BPC37 Custom] VITAMIN B12 & FOLATE [92755 CPT(R)] VITAMIN D, 25 HYDROXY [id number] CPT(R)] Follow-up Instructions Return in about 4 months (around 8/5/2018) for fasting, follow up. Referral Information Referral ID Referred By Referred To  
  
 [id number], [address] [facility] [address] [facility], [address] [facility] Phone: [phone] Fax: [phone] Visits Status Start Date End Date 1 New Request 4/5/18 4/5/19 If your referral has a status of pending review or denied, additional information will be sent to support the outcome of this decision. Patient Instructions Gastroesophageal Reflux Disease (GERD): Care Instructions Your Care Instructions Gastroesophageal reflux disease (GERD) is the backward flow of stomach acid into the esophagus. The esophagus is the tube that leads from your throat to your stomach. A one-way valve prevents the stomach acid from moving up into this tube. When you have GERD, this valve does not close tightly enough. If you have mild GERD symptoms including heartburn, you may be able to control the problem with antacids or over-the-counter medicine. Changing your diet, losing weight, and making other lifestyle changes can also help reduce symptoms. Follow-up care is a key part of your treatment and safety. Be sure to make and go to all appointments, and call your doctor if you are having problems. It's also a good idea to know your test results and keep a list of the medicines you take. How can you care for yourself at home? · Take your medicines exactly as prescribed. Call your doctor if you think you are having a problem with your medicine. · Your doctor may recommend over-the-counter medicine. For mild or occasional indigestion, antacids, such as Tums, Gaviscon, Mylanta, or Maalox, may help. Your doctor also may recommend over-the-counter acid reducers, such as Pepcid AC, Tagamet HB, Zantac 75, or Prilosec. Read and follow all instructions on the label. If you use these medicines often, talk with your doctor. · Change your eating habits. ¨ It's best to eat several small meals instead of two or three large meals. ¨ After you eat, wait 2 to 3 hours before you lie down. ¨ Chocolate, mint, and alcohol can make GERD worse.  
¨ Spicy foods, foods that have a lot of acid (like tomatoes and oranges), and coffee can make GERD symptoms worse in some people. If your symptoms are worse after you eat a certain food, you may want to stop eating that food to see if your symptoms get better. · Do not smoke or chew tobacco. Smoking can make GERD worse. If you need help quitting, talk to your doctor about stop-smoking programs and medicines. These can increase your chances of quitting for good. · If you have GERD symptoms at night, raise the head of your bed 6 to 8 inches by putting the frame on blocks or placing a foam wedge under the head of your mattress. (Adding extra pillows does not work.) · Do not wear tight clothing around your middle. · Lose weight if you need to. Losing just 5 to 10 pounds can help. When should you call for help? Call your doctor now or seek immediate medical care if: 
? · You have new or different belly pain. ? · Your stools are black and tarlike or have streaks of blood. ? Watch closely for changes in your health, and be sure to contact your doctor if: 
? · Your symptoms have not improved after 2 days. ? · Food seems to catch in your throat or chest.  
Where can you learn more? Go to http://bri-riley.info/. Enter Q474 in the search box to learn more about \"Gastroesophageal Reflux Disease (GERD): Care Instructions. \" Current as of: May 12, 2017 Content Version: 11.4 © 9604-1271 Healthwise, Incorporated. Care instructions adapted under license by [employer] (which disclaims liability or warranty for this information). If you have questions about a medical condition or this instruction, always ask your healthcare professional. [place] [address] any warranty or liability for your use of this information. Introducing \[facility] [place] [facility]\"" & HEALTH SERVICES! [facility] introduces [employer] patient portal. Now you can access parts of your medical record, email your doctor's office, and request medication refills online. 1. In your internet browser, go to [email or web address] 2. Click on the First Time User? Click Here link in the Sign In box. You will see the New Member Sign Up page. 3. Enter your SecondHome Access Code exactly as it appears below. You will not need to use this code after youve completed the sign-up process. If you do not sign up before the expiration date, you must request a new code. · Second[employer] Access Code: [email or web address] Expires: 7/4/2018 10:36 AM 
 
4. Enter the last four digits of your Social Security Number (xxxx) and Date of Birth (mm/dd/yyyy) as indicated and click Submit. You will be taken to the next sign-up page. 5. Create a Coupoplacest ID. This will be your SecondHome login ID and cannot be changed, so think of one that is secure and easy to remember. 6. Create a SecondHome password. You can change your password at any time. 7. Enter your Password Reset Question and Answer. This can be used at a later time if you forget your password. 8. Enter your e-mail address. You will receive e-mail notification when new information is available in [address] 9. Click Sign Up. You can now view and download portions of your medical record. 10. Click the Download Summary menu link to download a portable copy of your medical information. If you have questions, please visit the Frequently Asked Questions section of the SecondHome website. Remember, SecondHome is NOT to be used for urgent needs. For medical emergencies, dial 911. Now available from your iPhone and Android! Please provide this summary of care documentation to your next provider. Your primary care clinician is listed as Kwasi Groves. If you have any questions after today's visit, please call [phone].

## 2018-04-05 NOTE — PATIENT INSTRUCTIONS

## 2018-04-05 NOTE — PROGRESS NOTES
Chief Complaint   Patient presents with    Cholesterol Problem     Follow up    Hypertension    Heartburn     burning sensation in chest and stomach      1. Have you been to the ER, urgent care clinic since your last visit? Hospitalized since your last visit? No    2. Have you seen or consulted any other health care providers outside of the [address] [place] [address] since your last visit? Include any pap smears or colon screening.  No

## 2018-04-05 NOTE — PROGRESS NOTES
HISTORY OF PRESENT ILLNESS  Angelica Kang is a 79 y.o. male. He was seen for follow up on HTN, dyslipidemia, ataxia. Also concerned about frequent heart burns. His previous tongue mass has been resolved. HPI  Cardiovascular Review  The patient has hypertension and hyperlipidemia. He reports taking medications as instructed, no medication side effects noted, no TIA's, no chest pain on exertion, no dyspnea on exertion, no swelling of ankles, no orthostatic dizziness or lightheadedness, no orthopnea or paroxysmal nocturnal dyspnea, no palpitations, no intermittent claudication symptoms, no muscle aches or pain. Diet and Lifestyle: not attempting to follow a low fat, low cholesterol diet, not attempting to follow a low sodium diet, sedentary, alcohol intake 3-4 beers/day. Lab review: labs reviewed and discussed with patient. 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 He had MRI of head, Cervical spine and lumbar spine. 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 He was also referred for PT. He has negative RPR, normal homocystine and methylmalonic acid levels. He  had EMG and NCV which showed sensory neuropathy in both LE. Review of Systems   Constitutional: Negative for chills, fever and malaise/fatigue. HENT: Negative for congestion, ear pain, sore throat and tinnitus. Eyes: Negative for blurred vision, double vision, pain and discharge. Respiratory: Negative for cough, shortness of breath and wheezing. Cardiovascular: Negative for chest pain, palpitations and leg swelling.    Gastrointestinal: Positive for heartburn. Negative for abdominal pain, blood in stool, constipation, diarrhea, nausea and vomiting. Genitourinary: Negative for dysuria, frequency, hematuria and urgency. Musculoskeletal: Negative for back pain, joint pain and myalgias. Skin: Negative for rash. Neurological: Negative for dizziness, tremors, seizures and headaches. Endo/Heme/Allergies: Negative for polydipsia. Does not bruise/bleed easily. Psychiatric/Behavioral: Negative for depression and substance abuse. The patient is not nervous/anxious. Physical Exam   Constitutional: He is oriented to person, place, and time. He appears well-developed and well-nourished. HENT:   Head: Normocephalic and atraumatic. Right Ear: External ear normal.   Nose: Nose normal.   Mouth/Throat: Oropharynx is clear and moist. No oropharyngeal exudate. Eyes: Conjunctivae and EOM are normal. Pupils are equal, round, and reactive to light. No scleral icterus. Neck: Normal range of motion. Neck supple. No JVD present. No thyromegaly present. Cardiovascular: Normal rate, regular rhythm, normal heart sounds and intact distal pulses. Exam reveals no gallop and no friction rub. No murmur heard. Pulmonary/Chest: Effort normal and breath sounds normal. He has no wheezes. Abdominal: Soft. Bowel sounds are normal. He exhibits no distension and no mass. Musculoskeletal: Normal range of motion. He exhibits no edema or tenderness. Lymphadenopathy:     He has no cervical adenopathy. Neurological: He is alert and oriented to person, place, and time. He has normal reflexes. No cranial nerve deficit. Coordination normal.   Reflex Scores:       Tricep reflexes are 2+ on the right side and 2+ on the left side. Bicep reflexes are 2+ on the right side and 2+ on the left side. Brachioradialis reflexes are 2+ on the right side and 2+ on the left side. Decreased sensations on both lower extremities.   Exaggerated knee reflex on left side. He is using cane to support when he walks. Gait has been significantly improved consistently. Skin: Skin is warm and dry. No rash noted. He is not diaphoretic. Psychiatric: He has a normal mood and affect. Nursing note and vitals reviewed. ASSESSMENT and PLAN  Diagnoses and all orders for this visit:    1. Essential hypertension  -     METABOLIC PANEL, COMPREHENSIVE    2. Hypertriglyceridemia  -     METABOLIC PANEL, COMPREHENSIVE  -     LIPID PANEL    3. Hyperlipidemia with target LDL less than 324  -     METABOLIC PANEL, COMPREHENSIVE    4. Sensory ataxia  -     REFERRAL TO NEUROPSYCHOLOGY    5. Vitamin D deficiency  -     VITAMIN D, 25 HYDROXY    6. Vitamin B12 deficiency  -     VITAMIN B12 & FOLATE    7. Gastroesophageal reflux disease without esophagitis  -     Omeprazole delayed release (PRILOSEC D/R) 20 mg tablet; Take 1 Tab by mouth daily. 8. Late effect of stroke  -     REFERRAL TO NEUROPSYCHOLOGY      Discussed lifestyle issues and health guidance given  Patient was given an after visit summary which includes diagnoses, vital signs, current medications, instructions and references & authorized prescriptions . Results of labs will be conveyed to patient, once available. Pt verbalized instructions I provided and expressed understanding of discussion that was held today. Follow-up Disposition:  Return in about 4 months (around 8/5/2018) for fasting, follow up.

## 2018-04-07 LAB
25(OH)D3+25(OH)D2 SERPL-MCNC: 58.6 NG/ML (ref 30–100)
ALBUMIN SERPL-MCNC: 4.5 G/DL (ref 3.6–4.8)
ALBUMIN/GLOB SERPL: 1.6 {RATIO} (ref 1.2–2.2)
ALP SERPL-CCNC: 79 IU/L (ref 39–117)
ALT SERPL-CCNC: 20 IU/L (ref 0–44)
AST SERPL-CCNC: 23 IU/L (ref 0–40)
BILIRUB SERPL-MCNC: 0.5 MG/DL (ref 0–1.2)
BUN SERPL-MCNC: 10 MG/DL (ref 8–27)
BUN/CREAT SERPL: 10 (ref 10–24)
CALCIUM SERPL-MCNC: 9.6 MG/DL (ref 8.6–10.2)
CHLORIDE SERPL-SCNC: 101 MMOL/L (ref 96–106)
CHOLEST SERPL-MCNC: 164 MG/DL (ref 100–199)
CO2 SERPL-SCNC: 21 MMOL/L (ref 18–29)
CREAT SERPL-MCNC: 0.98 MG/DL (ref 0.76–1.27)
FOLATE SERPL-MCNC: 6.6 NG/ML
GFR SERPLBLD CREATININE-BSD FMLA CKD-EPI: 80 ML/MIN/1.73
GFR SERPLBLD CREATININE-BSD FMLA CKD-EPI: 92 ML/MIN/1.73
GLOBULIN SER CALC-MCNC: 2.9 G/DL (ref 1.5–4.5)
GLUCOSE SERPL-MCNC: 94 MG/DL (ref 65–99)
HDLC SERPL-MCNC: 36 MG/DL
INTERPRETATION, 910389: NORMAL
LDLC SERPL CALC-MCNC: 73 MG/DL (ref 0–99)
POTASSIUM SERPL-SCNC: 4.5 MMOL/L (ref 3.5–5.2)
PROT SERPL-MCNC: 7.4 G/DL (ref 6–8.5)
SODIUM SERPL-SCNC: 143 MMOL/L (ref 134–144)
TRIGL SERPL-MCNC: 275 MG/DL (ref 0–149)
VIT B12 SERPL-MCNC: 590 PG/ML (ref 232–1245)
VLDLC SERPL CALC-MCNC: 55 MG/DL (ref 5–40)

## 2018-04-08 NOTE — PROGRESS NOTES
Please inform patient's son   Grant Kemp, Vitamin B12 and Vitamin D level normal  Cholesterol results normal except again Triglyceride high, likely as he has resumed alcohol again. To limit alcohol.   thanks

## 2018-04-10 NOTE — PROGRESS NOTES
Outbound call to patients son who is on PHI. Patients  verified. Patients son made aware of lab results and recommendations per Dr. Clay Jones. Patients son voiced understanding.

## 2018-04-23 RX ORDER — CHOLECALCIFEROL (VITAMIN D3) 125 MCG
CAPSULE ORAL
Qty: 100 CAP | Refills: 0 | Status: SHIPPED | OUTPATIENT
Start: 2018-04-23 | End: 2018-07-29 | Stop reason: SDUPTHER

## 2018-04-24 ENCOUNTER — TELEPHONE (OUTPATIENT)
Dept: FAMILY MEDICINE CLINIC | Age: 68
End: 2018-04-24

## 2018-04-24 NOTE — TELEPHONE ENCOUNTER
The provider who patient was referred to does not see patients with these sx per referral. Can we change provider.

## 2018-04-24 NOTE — TELEPHONE ENCOUNTER
Timothy/w [facility] is asking if you can verify what you wanted on the script for the patient to be seen with what provider. Dx. Ataxia late affect of stroke did you want patient to see Neurologist or Neuro- Psychologist.  [phone].

## 2018-04-24 NOTE — TELEPHONE ENCOUNTER
Call placed to Carmelita Nayak at [facility] and clarified that patient is to be seen for memory concern and dementia.

## 2018-05-05 DIAGNOSIS — J06.9 VIRAL URI: ICD-10-CM

## 2018-05-05 RX ORDER — MONTELUKAST SODIUM 10 MG/1
TABLET ORAL
Qty: 30 TAB | Refills: 1 | Status: SHIPPED | OUTPATIENT
Start: 2018-05-05 | End: 2018-06-29 | Stop reason: SDUPTHER

## 2018-06-29 DIAGNOSIS — J06.9 VIRAL URI: ICD-10-CM

## 2018-06-29 RX ORDER — MONTELUKAST SODIUM 10 MG/1
TABLET ORAL
Qty: 30 TAB | Refills: 1 | Status: SHIPPED | OUTPATIENT
Start: 2018-06-29 | End: 2018-08-07 | Stop reason: ALTCHOICE

## 2018-07-10 DIAGNOSIS — K21.9 GASTROESOPHAGEAL REFLUX DISEASE WITHOUT ESOPHAGITIS: ICD-10-CM

## 2018-07-10 RX ORDER — PHENOL/SODIUM PHENOLATE
AEROSOL, SPRAY (ML) MUCOUS MEMBRANE
Qty: 28 TAB | Refills: 2 | Status: SHIPPED | OUTPATIENT
Start: 2018-07-10 | End: 2018-10-01 | Stop reason: SDUPTHER

## 2018-07-17 DIAGNOSIS — J06.9 VIRAL URI: ICD-10-CM

## 2018-07-17 RX ORDER — CROMOLYN SODIUM 40 MG/ML
SOLUTION/ DROPS OPHTHALMIC
Qty: 10 ML | Refills: 1 | Status: SHIPPED | OUTPATIENT
Start: 2018-07-17 | End: 2018-09-11 | Stop reason: SDUPTHER

## 2018-07-29 RX ORDER — CHOLECALCIFEROL (VITAMIN D3) 125 MCG
CAPSULE ORAL
Qty: 100 CAP | Refills: 0 | Status: SHIPPED | OUTPATIENT
Start: 2018-07-29 | End: 2018-11-06 | Stop reason: SDUPTHER

## 2018-08-01 RX ORDER — ATENOLOL 25 MG/1
TABLET ORAL
Qty: 30 TAB | Refills: 4 | Status: SHIPPED | OUTPATIENT
Start: 2018-08-01 | End: 2019-01-11 | Stop reason: SDUPTHER

## 2018-08-07 ENCOUNTER — OFFICE VISIT (OUTPATIENT)
Dept: FAMILY MEDICINE CLINIC | Age: 68
End: 2018-08-07

## 2018-08-07 VITALS
HEART RATE: 73 BPM | RESPIRATION RATE: 16 BRPM | TEMPERATURE: 98.3 F | HEIGHT: 64 IN | OXYGEN SATURATION: 98 % | WEIGHT: 123 LBS | DIASTOLIC BLOOD PRESSURE: 86 MMHG | SYSTOLIC BLOOD PRESSURE: 138 MMHG | BODY MASS INDEX: 21 KG/M2

## 2018-08-07 DIAGNOSIS — Z23 ENCOUNTER FOR IMMUNIZATION: ICD-10-CM

## 2018-08-07 DIAGNOSIS — I10 ESSENTIAL HYPERTENSION: Primary | ICD-10-CM

## 2018-08-07 DIAGNOSIS — R27.8 SENSORY ATAXIA: ICD-10-CM

## 2018-08-07 DIAGNOSIS — Z71.89 ADVANCED CARE PLANNING/COUNSELING DISCUSSION: ICD-10-CM

## 2018-08-07 DIAGNOSIS — E55.9 VITAMIN D DEFICIENCY: ICD-10-CM

## 2018-08-07 DIAGNOSIS — E53.8 VITAMIN B12 DEFICIENCY: Chronic | ICD-10-CM

## 2018-08-07 DIAGNOSIS — E78.5 HYPERLIPIDEMIA WITH TARGET LDL LESS THAN 130: ICD-10-CM

## 2018-08-07 NOTE — ACP (ADVANCE CARE PLANNING)
Advance Care Planning (ACP) Provider Conversation Snapshot    Date of ACP Conversation: 08/07/18  Persons included in Conversation:  patient and family  Length of ACP Conversation in minutes:  <16 minutes (Non-Billable)    Authorized Decision Maker (if patient is incapable of making informed decisions): Navid Shahid ( [phone])  This person is: Other Legally Authorized Decision Maker (e.g. Next of Kin)          For Patients with Decision Making Capacity:   Values/Goals: Exploration of values, goals, and preferences if recovery is not expected, even with continued medical treatment in the event of:  Imminent death  Severe, permanent brain injury  \"In these circumstances, what matters most to you? \"  Care focused more on quantity (length) of life.     Conversation Outcomes / Follow-Up Plan:   Recommended completion of Advance Directive form after review of ACP materials and conversation with prospective healthcare agent   Recommended communicating the plan and making copies for the healthcare agent, personal physician, and others as appropriate (e.g., health system)  Recommended review of completed ACP document annually or upon change in health status  form was given today to complete

## 2018-08-07 NOTE — PATIENT INSTRUCTIONS

## 2018-08-07 NOTE — MR AVS SNAPSHOT
[address] Katelynn Liao 13 
[phone] Patient: Angelica Rosales MRN: [id number] KTS:2[birth year]471 Visit Information Date & Time Provider Department Dept. Phone Encounter #  
 8/7/2018  9:20 AM [facility]barra [facility] [phone] [id number] Follow-up Instructions Return in about 3 months (around 11/7/2018) for fasting, physical.  
  
Your Appointments 12/6/2018 10:40 AM  
New Patient with Jeferson Kowalski PsyD [address] ([address]) Appt Note: N/P eval for dementia [facility] 04/26/18  
 [place] [address] [address] [facility], [facility] [address] [phone]  
  
   
 [place] [address] [facility] [address] [id number] [address] Upcoming Health Maintenance Date Due  
 GLAUCOMA SCREENING Q2Y 1/1/2017 Pneumococcal 65+ Low/Medium Risk (2 of 2 - PCV13) 5/10/2018 Influenza Age 5 to Adult 10/7/2018* COLONOSCOPY 1/21/2020 DTaP/Tdap/Td series (2 - Td) 6/27/2024 *Topic was postponed. The date shown is not the original due date. Allergies as of 8/7/2018  Review Complete On: 8/7/2018 By: Shirlene Lvei LPN No Known Allergies Current Immunizations  Reviewed on 8/7/2018 Name Date Influenza High Dose Vaccine PF 9/11/2017 Influenza Vaccine 12/10/2015 Influenza Vaccine (Quad) PF 12/10/2015 Pneumococcal Conjugate (PCV-13)  Incomplete Pneumococcal Polysaccharide (PPSV-23) 5/10/2017 Td 6/27/2014, 7/2/2012 Tdap 6/13/2013 Reviewed by Zackary Ibarra MD on 8/7/2018 at 10:00 AM  
You Were Diagnosed With   
  
 Codes Comments Essential hypertension    -  Primary ICD-10-CM: I10 
ICD-9-CM: 401.9 Vitamin B12 deficiency     ICD-10-CM: E53.8 ICD-9-CM: 266.2 Sensory ataxia     ICD-10-CM: R27.8 ICD-9-CM: 130. 3 Hyperlipidemia with target LDL less than 130     ICD-10-CM: E78.5 ICD-9-CM: 272.4 Vitamin D deficiency     ICD-10-CM: E55.9 ICD-9-CM: 268.9 Advanced care planning/counseling discussion     ICD-10-CM: Z71.89 ICD-9-CM: V65.49 Encounter for immunization     ICD-10-CM: N96 ICD-9-CM: V03.89 Vitals BP Pulse Temp Resp Height(growth percentile) Weight(growth percentile) 138/86 (BP 1 Location: Left arm, BP Patient Position: Sitting) 73 98.3 °F (36.8 °C) (Oral) 16 5' 4\" (1.626 m) 123 lb (55.8 kg) SpO2 BMI Smoking Status 98% 21.11 kg/m2 Never Smoker Vitals History BMI and BSA Data Body Mass Index Body Surface Area  
 21.11 kg/m 2 1.59 m 2 Preferred Pharmacy Pharmacy Name Phone [id number]- [facility], [address] [facility] [phone] Your Updated Medication List  
  
   
This list is accurate as of 8/7/18 10:13 AM.  Always use your most recent med list.  
  
  
  
  
 atenolol 25 mg tablet Commonly known as:  TENORMIN  
TAKE 1 TABLET BY MOUTH EVERY DAY  
  
 atorvastatin 10 mg tablet Commonly known as:  LIPITOR  
TAKE 1 TABLET BY MOUTH EVERY DAY  
  
 cholecalciferol 5,000 unit capsule Commonly known as:  VITAMIN D3  
TAKE ONE CAPSULE BY MOUTH EVERY DAY  
  
 cromolyn 4 % ophthalmic solution Commonly known as:  OPTICROM INSTILL 1 DROP INTO EACH EYE 4 TIMES A DAY  
  
 cyanocobalamin 500 mcg tablet Commonly known as:  VITAMIN B12  
TAKE 1 TAB BY MOUTH DAILY. Omeprazole delayed release 20 mg tablet Commonly known as:  PRILOSEC D/R  
TAKE 1 TABLET BY MOUTH EVERY DAY We Performed the Following LIPID PANEL [74571 CPT(R)] METABOLIC PANEL, COMPREHENSIVE [48792 CPT(R)] PNEUMOCOCCAL CONJ VACCINE 13 VALENT IM [id number] CPT(R)] [place] IMMUNIZ ADMIN,1 SINGLE/COMB VAC/TOXOID [id number] CPT(R)] VITAMIN B12 & FOLATE [99353 CPT(R)] VITAMIN D, 25 HYDROXY [id number] CPT(R)] Follow-up Instructions Return in about 3 months (around 11/7/2018) for fasting, physical.  
  
  
Patient Instructions DASH Diet: Care Instructions Your Care Instructions The DASH diet is an eating plan that can help lower your blood pressure. DASH stands for Dietary Approaches to Stop Hypertension. Hypertension is high blood pressure. The DASH diet focuses on eating foods that are high in calcium, potassium, and magnesium. These nutrients can lower blood pressure. The foods that are highest in these nutrients are fruits, vegetables, low-fat dairy products, nuts, seeds, and legumes. But taking calcium, potassium, and magnesium supplements instead of eating foods that are high in those nutrients does not have the same effect. The DASH diet also includes whole grains, fish, and poultry. The DASH diet is one of several lifestyle changes your doctor may recommend to lower your high blood pressure. Your doctor may also want you to decrease the amount of sodium in your diet. Lowering sodium while following the DASH diet can lower blood pressure even further than just the DASH diet alone. Follow-up care is a key part of your treatment and safety. Be sure to make and go to all appointments, and call your doctor if you are having problems. It's also a good idea to know your test results and keep a list of the medicines you take. How can you care for yourself at home? Following the DASH diet · Eat 4 to 5 servings of fruit each day. A serving is 1 medium-sized piece of fruit, ½ cup chopped or canned fruit, 1/4 cup dried fruit, or 4 ounces (½ cup) of fruit juice. Choose fruit more often than fruit juice. · Eat 4 to 5 servings of vegetables each day. A serving is 1 cup of lettuce or raw leafy vegetables, ½ cup of chopped or cooked vegetables, or 4 ounces (½ cup) of vegetable juice. Choose vegetables more often than vegetable juice. · Get 2 to 3 servings of low-fat and fat-free dairy each day.  A serving is 8 ounces of milk, 1 cup of yogurt, or 1 ½ ounces of cheese. · Eat 6 to 8 servings of grains each day. A serving is 1 slice of bread, 1 ounce of dry cereal, or ½ cup of cooked rice, pasta, or cooked cereal. Try to choose whole-grain products as much as possible. · Limit lean meat, poultry, and fish to 2 servings each day. A serving is 3 ounces, about the size of a deck of cards. · Eat 4 to 5 servings of nuts, seeds, and legumes (cooked dried beans, lentils, and split peas) each week. A serving is 1/3 cup of nuts, 2 tablespoons of seeds, or ½ cup of cooked beans or peas. · Limit fats and oils to 2 to 3 servings each day. A serving is 1 teaspoon of vegetable oil or 2 tablespoons of salad dressing. · Limit sweets and added sugars to 5 servings or less a week. A serving is 1 tablespoon jelly or jam, ½ cup sorbet, or 1 cup of lemonade. · Eat less than 2,300 milligrams (mg) of sodium a day. If you limit your sodium to 1,500 mg a day, you can lower your blood pressure even more. Tips for success · Start small. Do not try to make dramatic changes to your diet all at once. You might feel that you are missing out on your favorite foods and then be more likely to not follow the plan. Make small changes, and stick with them. Once those changes become habit, add a few more changes. · Try some of the following: ¨ Make it a goal to eat a fruit or vegetable at every meal and at snacks. This will make it easy to get the recommended amount of fruits and vegetables each day. ¨ Try yogurt topped with fruit and nuts for a snack or healthy dessert. ¨ Add lettuce, tomato, cucumber, and onion to sandwiches. ¨ Combine a ready-made pizza crust with low-fat mozzarella cheese and lots of vegetable toppings. Try using tomatoes, squash, spinach, broccoli, carrots, cauliflower, and onions. ¨ Have a variety of cut-up vegetables with a low-fat dip as an appetizer instead of chips and dip. ¨ Sprinkle sunflower seeds or chopped almonds over salads. Or try adding chopped walnuts or almonds to cooked vegetables. ¨ Try some vegetarian meals using beans and peas. Add garbanzo or kidney beans to salads. Make burritos and tacos with mashed knapp beans or black beans. Where can you learn more? Go to http://bri-riley.info/. Enter N753 in the search box to learn more about \"DASH Diet: Care Instructions. \" Current as of: December 6, 2017 Content Version: 11.7 © 4041-3708 [employer]. Care instructions adapted under license by [employer] (which disclaims liability or warranty for this information). If you have questions about a medical condition or this instruction, always ask your healthcare professional. [address] any warranty or liability for your use of this information. Introducing [facility]! [employer] introduces [employer]izedWisdom patient portal. Now you can access parts of your medical record, email your doctor's office, and request medication refills online. 1. In your internet browser, go to [email or web address]. [email or web address] 2. Click on the First Time User? Click Here link in the Sign In box. You will see the New Member Sign Up page. 3. Enter your OrganizedWisdom Access Code exactly as it appears below. You will not need to use this code after youve completed the sign-up process. If you do not sign up before the expiration date, you must request a new code. · [employer]ized[employer] Access Code: [email or web address] Expires: 11/5/2018  9:13 AM 
 
4. Enter the last four digits of your Social Security Number (xxxx) and Date of Birth (mm/dd/yyyy) as indicated and click Submit. You will be taken to the next sign-up page. 5. Create a BioAnalytixt ID. This will be your [employer] login ID and cannot be changed, so think of one that is secure and easy to remember. 6. Create a [employer] password. You can change your password at any time. 7. Enter your Password Reset Question and Answer. This can be used at a later time if you forget your password. 8. Enter your e-mail address. You will receive e-mail notification when new information is available in [address] 9. Click Sign Up. You can now view and download portions of your medical record. 10. Click the Download Summary menu link to download a portable copy of your medical information. If you have questions, please visit the Frequently Asked Questions section of the [employer] website. Remember, [employer] is NOT to be used for urgent needs. For medical emergencies, dial 911. Now available from your iPhone and Android! Please provide this summary of care documentation to your next provider. Your primary care clinician is listed as Noe Casey. If you have any questions after today's visit, please call [phone].

## 2018-08-07 NOTE — PROGRESS NOTES
Chief Complaint   Patient presents with    Cholesterol Problem     Follow up, Fasting     Hypertension    Immunization/Injection     Pneumococcal 13     1. Have you been to the ER, urgent care clinic since your last visit? Hospitalized since your last visit? No    2. Have you seen or consulted any other health care providers outside of the [address] [place] [address] since your last visit? Include any pap smears or colon screening. No    Angelica QUINONES Hannah Aiken  is a 79 y.o.  male  who present for Pneumococcal 13 immunizations/injections. He/she denies any symptoms , reactions or allergies that would exclude them from being immunized today. Risks and adverse reactions were discussed and the VIS was given if applicable to them. All questions were addressed. He/She was observed for 10 min post injection. There were no reactions observed.     Petra Theodore LPN

## 2018-08-07 NOTE — PROGRESS NOTES
HISTORY OF PRESENT ILLNESS  Angelica Deal is a 79 y.o. male. He was seen for 4 months follow up on HTN, dyslipidemia, sensory ataxia. HPI  Cardiovascular Review  The patient has hypertension and hyperlipidemia. He reports taking medications as instructed, no medication side effects noted, no TIA's, no chest pain on exertion, no dyspnea on exertion, no swelling of ankles, no orthostatic dizziness or lightheadedness, no orthopnea or paroxysmal nocturnal dyspnea, no palpitations, no intermittent claudication symptoms, no muscle aches or pain. Diet and Lifestyle: not attempting to follow a low fat, low cholesterol diet, not attempting to follow a low sodium diet, sedentary, alcohol intake 3-4 beers/day. Lab review: labs reviewed and discussed with patient. 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 He had MRI of head, Cervical spine and lumbar spine. 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 He was also referred for PT. He has negative RPR, normal homocystine and methylmalonic acid levels. He  had EMG and NCV which showed sensory neuropathy in both LE. Is on Vitamin b12 and Vitamin D supplement. Review of Systems   Constitutional: Negative for chills, fever and malaise/fatigue. HENT: Negative for congestion, ear pain, sore throat and tinnitus. Eyes: Negative for blurred vision, double vision, pain and discharge. Respiratory: Negative for cough, shortness of breath and wheezing. Cardiovascular: Negative for chest pain, palpitations and leg swelling.    Gastrointestinal: Negative for abdominal pain, blood in stool, constipation, diarrhea, nausea and vomiting. Genitourinary: Negative for dysuria, frequency, hematuria and urgency. Musculoskeletal: Negative for back pain, joint pain and myalgias. Skin: Negative for rash. Neurological: Negative for dizziness, tremors, seizures and headaches. Endo/Heme/Allergies: Negative for polydipsia. Does not bruise/bleed easily. Psychiatric/Behavioral: Negative for depression and substance abuse. The patient is not nervous/anxious. Physical Exam   Constitutional: He is oriented to person, place, and time. He appears well-developed and well-nourished. HENT:   Head: Normocephalic and atraumatic. Right Ear: External ear normal.   Nose: Nose normal.   Mouth/Throat: Oropharynx is clear and moist. No oropharyngeal exudate. Eyes: Conjunctivae and EOM are normal. Pupils are equal, round, and reactive to light. No scleral icterus. Neck: Normal range of motion. Neck supple. No JVD present. No thyromegaly present. Cardiovascular: Normal rate, regular rhythm, normal heart sounds and intact distal pulses. Exam reveals no gallop and no friction rub. No murmur heard. Pulmonary/Chest: Effort normal and breath sounds normal. He has no wheezes. Abdominal: Soft. Bowel sounds are normal. He exhibits no distension and no mass. Musculoskeletal: Normal range of motion. He exhibits no edema or tenderness. Lymphadenopathy:     He has no cervical adenopathy. Neurological: He is alert and oriented to person, place, and time. He has normal reflexes. No cranial nerve deficit. Coordination normal.   Reflex Scores:       Tricep reflexes are 2+ on the right side and 2+ on the left side. Bicep reflexes are 2+ on the right side and 2+ on the left side. Brachioradialis reflexes are 2+ on the right side and 2+ on the left side. Decreased sensations on both lower extremities. Exaggerated knee reflex on left side.  He is using cane to support when he walks. Gait has been significantly improved consistently. Skin: Skin is warm and dry. No rash noted. He is not diaphoretic. Psychiatric: He has a normal mood and affect. Nursing note and vitals reviewed. ASSESSMENT and PLAN  Diagnoses and all orders for this visit:    1. Essential hypertension  -     METABOLIC PANEL, COMPREHENSIVE    2. Vitamin B12 deficiency  -     VITAMIN B12 & FOLATE    3. Sensory ataxia  -     VITAMIN D, 25 HYDROXY    4. Hyperlipidemia with target LDL less than 989  -     METABOLIC PANEL, COMPREHENSIVE  -     LIPID PANEL    5. Vitamin D deficiency  -     VITAMIN D, 25 HYDROXY    6. Advanced care planning/counseling discussion    7. Encounter for immunization  -     PNEUMOCOCCAL CONJ VACCINE 13 VALENT IM (Age 48 and over)  -     [place] IMMUNIZ ADMIN,1 SINGLE/COMB VAC/TOXOID      Discussed lifestyle issues and health guidance given  Patient was given an after visit summary which includes diagnoses, vital signs, current medications, instructions and references & authorized prescriptions . Results of labs will be conveyed to patient, once available. Pt verbalized instructions I provided and expressed understanding of discussion that was held today.   Follow-up Disposition:  Return in about 3 months (around 11/7/2018) for fasting, physical.

## 2018-08-08 LAB
25(OH)D3+25(OH)D2 SERPL-MCNC: 75.7 NG/ML (ref 30–100)
ALBUMIN SERPL-MCNC: 4.5 G/DL (ref 3.6–4.8)
ALBUMIN/GLOB SERPL: 1.6 {RATIO} (ref 1.2–2.2)
ALP SERPL-CCNC: 75 IU/L (ref 39–117)
ALT SERPL-CCNC: 36 IU/L (ref 0–44)
AST SERPL-CCNC: 25 IU/L (ref 0–40)
BILIRUB SERPL-MCNC: 0.6 MG/DL (ref 0–1.2)
BUN SERPL-MCNC: 7 MG/DL (ref 8–27)
BUN/CREAT SERPL: 7 (ref 10–24)
CALCIUM SERPL-MCNC: 9.2 MG/DL (ref 8.6–10.2)
CHLORIDE SERPL-SCNC: 108 MMOL/L (ref 96–106)
CHOLEST SERPL-MCNC: 116 MG/DL (ref 100–199)
CO2 SERPL-SCNC: 21 MMOL/L (ref 20–29)
CREAT SERPL-MCNC: 1.02 MG/DL (ref 0.76–1.27)
FOLATE SERPL-MCNC: 11.1 NG/ML
GLOBULIN SER CALC-MCNC: 2.8 G/DL (ref 1.5–4.5)
GLUCOSE SERPL-MCNC: 94 MG/DL (ref 65–99)
HDLC SERPL-MCNC: 33 MG/DL
INTERPRETATION, 910389: NORMAL
LDLC SERPL CALC-MCNC: 47 MG/DL (ref 0–99)
POTASSIUM SERPL-SCNC: 4.1 MMOL/L (ref 3.5–5.2)
PROT SERPL-MCNC: 7.3 G/DL (ref 6–8.5)
SODIUM SERPL-SCNC: 144 MMOL/L (ref 134–144)
TRIGL SERPL-MCNC: 180 MG/DL (ref 0–149)
VIT B12 SERPL-MCNC: 580 PG/ML (ref 232–1245)
VLDLC SERPL CALC-MCNC: 36 MG/DL (ref 5–40)

## 2018-08-08 NOTE — PROGRESS NOTES
Please inform son,  Kidney, liver, cholesterol, vitamin b12, Vitamin d all levels very reassuring  thanks

## 2018-08-08 NOTE — PROGRESS NOTES
Outbound call to number on file. Person answered. I asked for Simi who is on PHI. Person hung up phone. Will attempt again. Letter printed with normal results.

## 2018-09-05 DIAGNOSIS — J06.9 VIRAL URI: ICD-10-CM

## 2018-09-05 RX ORDER — MONTELUKAST SODIUM 10 MG/1
TABLET ORAL
Qty: 30 TAB | Refills: 1 | Status: SHIPPED | OUTPATIENT
Start: 2018-09-05 | End: 2018-11-05 | Stop reason: SDUPTHER

## 2018-09-11 DIAGNOSIS — J06.9 VIRAL URI: ICD-10-CM

## 2018-09-11 RX ORDER — CROMOLYN SODIUM 40 MG/ML
SOLUTION/ DROPS OPHTHALMIC
Qty: 10 ML | Refills: 1 | Status: SHIPPED | OUTPATIENT
Start: 2018-09-11 | End: 2018-10-22 | Stop reason: SDUPTHER

## 2018-09-30 RX ORDER — ATORVASTATIN CALCIUM 10 MG/1
TABLET, FILM COATED ORAL
Qty: 30 TAB | Refills: 5 | Status: SHIPPED | OUTPATIENT
Start: 2018-09-30 | End: 2019-04-17 | Stop reason: SDUPTHER

## 2018-10-01 DIAGNOSIS — K21.9 GASTROESOPHAGEAL REFLUX DISEASE WITHOUT ESOPHAGITIS: ICD-10-CM

## 2018-10-01 RX ORDER — PHENOL/SODIUM PHENOLATE
AEROSOL, SPRAY (ML) MUCOUS MEMBRANE
Qty: 28 TAB | Refills: 2 | Status: SHIPPED | OUTPATIENT
Start: 2018-10-01 | End: 2018-12-30 | Stop reason: SDUPTHER

## 2018-10-22 DIAGNOSIS — J06.9 VIRAL URI: ICD-10-CM

## 2018-10-22 RX ORDER — CROMOLYN SODIUM 40 MG/ML
SOLUTION/ DROPS OPHTHALMIC
Qty: 10 ML | Refills: 1 | Status: SHIPPED | OUTPATIENT
Start: 2018-10-22 | End: 2019-04-23 | Stop reason: SDUPTHER

## 2018-11-05 DIAGNOSIS — J06.9 VIRAL URI: ICD-10-CM

## 2018-11-05 RX ORDER — MONTELUKAST SODIUM 10 MG/1
TABLET ORAL
Qty: 30 TAB | Refills: 1 | Status: SHIPPED | OUTPATIENT
Start: 2018-11-05 | End: 2019-01-11 | Stop reason: SDUPTHER

## 2018-11-06 RX ORDER — CHOLECALCIFEROL (VITAMIN D3) 125 MCG
CAPSULE ORAL
Qty: 100 CAP | Refills: 0 | Status: SHIPPED | OUTPATIENT
Start: 2018-11-06 | End: 2019-02-14 | Stop reason: SDUPTHER

## 2018-12-30 DIAGNOSIS — K21.9 GASTROESOPHAGEAL REFLUX DISEASE WITHOUT ESOPHAGITIS: ICD-10-CM

## 2018-12-30 RX ORDER — PHENOL/SODIUM PHENOLATE
AEROSOL, SPRAY (ML) MUCOUS MEMBRANE
Qty: 28 TAB | Refills: 2 | Status: SHIPPED | OUTPATIENT
Start: 2018-12-30 | End: 2019-01-22 | Stop reason: SDUPTHER

## 2019-01-11 DIAGNOSIS — J06.9 VIRAL URI: ICD-10-CM

## 2019-01-11 RX ORDER — MONTELUKAST SODIUM 10 MG/1
TABLET ORAL
Qty: 30 TAB | Refills: 1 | Status: SHIPPED | OUTPATIENT
Start: 2019-01-11 | End: 2019-03-31 | Stop reason: SDUPTHER

## 2019-01-11 RX ORDER — ATENOLOL 25 MG/1
TABLET ORAL
Qty: 30 TAB | Refills: 4 | Status: SHIPPED | OUTPATIENT
Start: 2019-01-11 | End: 2019-05-14 | Stop reason: SDUPTHER

## 2019-01-22 ENCOUNTER — OFFICE VISIT (OUTPATIENT)
Dept: FAMILY MEDICINE CLINIC | Age: 69
End: 2019-01-22

## 2019-01-22 VITALS
SYSTOLIC BLOOD PRESSURE: 120 MMHG | DIASTOLIC BLOOD PRESSURE: 80 MMHG | RESPIRATION RATE: 16 BRPM | WEIGHT: 124 LBS | HEIGHT: 64 IN | OXYGEN SATURATION: 98 % | BODY MASS INDEX: 21.17 KG/M2 | TEMPERATURE: 98.3 F | HEART RATE: 63 BPM

## 2019-01-22 DIAGNOSIS — Z23 ENCOUNTER FOR IMMUNIZATION: ICD-10-CM

## 2019-01-22 DIAGNOSIS — E53.8 VITAMIN B12 DEFICIENCY: ICD-10-CM

## 2019-01-22 DIAGNOSIS — Z00.00 ROUTINE PHYSICAL EXAMINATION: Primary | ICD-10-CM

## 2019-01-22 DIAGNOSIS — K21.9 GASTROESOPHAGEAL REFLUX DISEASE WITHOUT ESOPHAGITIS: ICD-10-CM

## 2019-01-22 DIAGNOSIS — E55.9 VITAMIN D DEFICIENCY: ICD-10-CM

## 2019-01-22 RX ORDER — PHENOL/SODIUM PHENOLATE
AEROSOL, SPRAY (ML) MUCOUS MEMBRANE
Qty: 30 TAB | Refills: 2 | Status: SHIPPED | OUTPATIENT
Start: 2019-01-22 | End: 2019-01-22 | Stop reason: SDUPTHER

## 2019-01-22 RX ORDER — PHENOL/SODIUM PHENOLATE
AEROSOL, SPRAY (ML) MUCOUS MEMBRANE
Qty: 30 TAB | Refills: 2 | Status: SHIPPED | OUTPATIENT
Start: 2019-01-22 | End: 2019-07-03 | Stop reason: SDUPTHER

## 2019-01-22 NOTE — PROGRESS NOTES
HISTORY OF PRESENT ILLNESS Jaden Watters is a 81 y.o. male. he was seen for his age appropriate physical exam,including immunization and cancer screening. HPI Health Maintenance Immunizations:  
  Influenza: will get this done today. Tetanus: up to date. Shingles: not UTD - script provided. Pneumonia: up to date. Cancer screening:   
 Colon: reviewed guidelines, up to date. Prostate: reviewed guidelines, order placed. Cardiovascular Review The patient has hypertension and hyperlipidemia. He reports taking medications as instructed, no medication side effects noted, no TIA's, no chest pain on exertion, no dyspnea on exertion, no swelling of ankles, no orthostatic dizziness or lightheadedness, no orthopnea or paroxysmal nocturnal dyspnea, no palpitations, no intermittent claudication symptoms, no muscle aches or pain. Diet and Lifestyle: not attempting to follow a low fat, low cholesterol diet, not attempting to follow a low sodium diet, sedentary, alcohol intake 3-4 beers/day. Lab review: labs reviewed and discussed with patient. Medicines: Atenolol 25 mg and Lipitor 10 mg 
  
Lab Results Component Value Date/Time Cholesterol, total 116 08/07/2018 10:28 AM  
 HDL Cholesterol 33 (L) 08/07/2018 10:28 AM  
 LDL, calculated 47 08/07/2018 10:28 AM  
 VLDL, calculated 36 08/07/2018 10:28 AM  
 Triglyceride 180 (H) 08/07/2018 10:28 AM  
 
 
  
Sensory Ataxia 
diagnosed as length dependent neuropathy. He had MRI of head, Cervical spine and lumbar spine. 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 He was also referred for PT. He has negative RPR, normal homocystine and methylmalonic acid levels. He  had EMG and NCV which showed sensory neuropathy in both LE. Is on Vitamin b12 and Vitamin D supplement. Patient Care Team: 
Valentino Arts, MD as PCP - [facility]) Karyle Olp, RN as Ambulatory Care Navigator The following sections were reviewed & updated as appropriate: PMH, PSH, FH, and SH. Patient Active Problem List  
Diagnosis Code  Vitamin B12 deficiency E53.8  Essential hypertension I10  
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  
 Gait disturbance R26.9  Sensory ataxia R27.8  Hypertriglyceridemia E78.1  Hyperlipidemia with target LDL less than 130 E78.5  Acute back pain M54.9  Lumbar sprain S33. Doy Foot  Vitamin D deficiency E55.9  Pulmonary nodule seen on imaging study R91.1  Colon cancer screening Z12.11  
 Tachycardia R00.0  Routine physical examination Z00.00  Osteoarthritis of knee, unilateral M17.10  Need for pneumococcal vaccination [facility] discharge follow-up Z09  
 Contusion of right chest wall S20.211A  Tongue mass R22.0 Prior to Admission medications Medication Sig Start Date End Date Taking? Authorizing Provider Omeprazole delayed release (PRILOSEC D/R) 20 mg tablet TAKE 1 TABLET BY MOUTH EVERY DAY 1/22/19  Yes Rebecca Menezes MD  
montelukast (SINGULAIR) 10 mg tablet TAKE 1 TABLET BY MOUTH EVERY DAY 1/11/19  Yes Rebecca Menezes MD  
atenolol (TENORMIN) 25 mg tablet TAKE 1 TABLET BY MOUTH EVERY DAY 1/11/19  Yes Rebecca Menezes MD  
cholecalciferol (VITAMIN D3) 5,000 unit capsule TAKE 1 CAPSULE BY MOUTH EVERY DAY 11/6/18  Yes Rebecca Menezes MD  
cromolyn (OPTICROM) 4 % ophthalmic solution PLACE 1 DROP IN [facility] EYE 4 TIMES A DAY 10/22/18  Yes Rebecca Menezes MD  
atorvastatin (LIPITOR) 10 mg tablet TAKE 1 TABLET BY MOUTH EVERY DAY 9/30/18  Yes Rebecca Menezes MD  
cyanocobalamin (VITAMIN B12) 500 mcg tablet TAKE 1 TAB BY MOUTH DAILY. 8/31/16  Yes Rebecca Menezes MD  
  
 
 
No Known Allergies Review of Systems Constitutional: Negative for chills, fever and malaise/fatigue. HENT: Negative for congestion, ear pain, sore throat and tinnitus. Eyes: Negative for blurred vision, double vision, pain and discharge. Respiratory: Negative for cough, shortness of breath and wheezing. Cardiovascular: Negative for chest pain, palpitations and leg swelling. Gastrointestinal: Negative for abdominal pain, blood in stool, constipation, diarrhea, nausea and vomiting. Genitourinary: Negative for dysuria, frequency, hematuria and urgency. Musculoskeletal: Negative for back pain, joint pain and myalgias. Skin: Negative for rash. Neurological: Negative for dizziness, tremors, seizures and headaches. Endo/Heme/Allergies: Negative for polydipsia. Does not bruise/bleed easily. Psychiatric/Behavioral: Negative for depression and substance abuse. The patient is not nervous/anxious. Physical Exam  
Constitutional: He is oriented to person, place, and time. He appears well-developed and well-nourished. HENT:  
Head: Normocephalic and atraumatic. Right Ear: External ear normal.  
Left Ear: External ear normal.  
Nose: Nose normal.  
Mouth/Throat: Oropharynx is clear and moist. No oropharyngeal exudate. Eyes: Conjunctivae and EOM are normal. Pupils are equal, round, and reactive to light. No scleral icterus. Neck: Normal range of motion. Neck supple. No JVD present. No thyromegaly present. Cardiovascular: Normal rate, regular rhythm, normal heart sounds and intact distal pulses. Exam reveals no gallop and no friction rub. No murmur heard. Pulmonary/Chest: Effort normal and breath sounds normal. No respiratory distress. He has no wheezes. Abdominal: Soft. Bowel sounds are normal. He exhibits no distension and no mass. Genitourinary: Testes normal and penis normal. Right testis shows no mass. Left testis shows no mass. Musculoskeletal: Normal range of motion. He exhibits no edema or tenderness. Lymphadenopathy:  
  He has no cervical adenopathy. Neurological: He is alert and oriented to person, place, and time. He has normal strength and normal reflexes. No cranial nerve deficit or sensory deficit. Coordination normal.  
Reflex Scores: 
     Tricep reflexes are 2+ on the right side and 2+ on the left side. Bicep reflexes are 2+ on the right side and 2+ on the left side. Brachioradialis reflexes are 2+ on the right side and 2+ on the left side. Cranial nerves 2-12 normal  
Skin: Skin is warm and dry. No rash noted. He is not diaphoretic. Psychiatric: He has a normal mood and affect. His behavior is normal. Thought content normal.  
Nursing note and vitals reviewed. ASSESSMENT and PLAN Diagnoses and all orders for this visit: 1. Routine physical examination 
-     CBC WITH AUTOMATED DIFF 
-     METABOLIC PANEL, COMPREHENSIVE 
-     PSA W/ REFLX FREE PSA 
-     HEMOGLOBIN A1C WITH EAG 
-     TSH REFLEX TO T4 
-     URINALYSIS W/ RFLX MICROSCOPIC 
-     LIPID PANEL 2. Vitamin B12 deficiency 
-     VITAMIN B12 
 
3. Encounter for immunization -     [facility] IMMUNIZ ADMIN,1 SINGLE/COMB VAC/TOXOID 
-     INFLUENZA VACCINE INACTIVATED (IIV), SUBUNIT, ADJUVANTED, IM 
 
4. Vitamin D deficiency 
-     VITAMIN D, 25 HYDROXY 5. Gastroesophageal reflux disease without esophagitis -     Omeprazole delayed release (PRILOSEC D/R) 20 mg tablet; TAKE 1 TABLET BY MOUTH EVERY DAY Discussed lifestyle issues and health guidance given Patient was given an after visit summary which includes diagnoses, vital signs, current medications, instructions and references & authorized prescriptions . Results of labs will be conveyed to patient, once available. Pt verbalized instructions I provided and expressed understanding of discussion that was held today. Follow-up Disposition: 
Return in about 4 months (around 5/22/2019) for fasting, follow up.

## 2019-01-22 NOTE — PATIENT INSTRUCTIONS

## 2019-01-22 NOTE — PROGRESS NOTES
Chief Complaint Patient presents with  Cholesterol Problem Follow up, Fasting  Hypertension  Chest Pain Patient states\" when I take omeprazole it gets better\"  Immunization/Injection Influenza High dose 1. Have you been to the ER, urgent care clinic since your last visit? Hospitalized since your last visit? No 
 
2. Have you seen or consulted any other health care providers outside of the [address] [place] [address] since your last visit? Include any pap smears or colon screening. No 
 
Angelica QUINONES Sachin Louise  is a 81 y.o.  male  who present for Influenza High dose  immunizations/injections. He/she denies any symptoms , reactions or allergies that would exclude them from being immunized today. Risks and adverse reactions were discussed and the VIS was given if applicable to them. All questions were addressed. He/She was observed for 10 min post injection. There were no reactions observed.  
 
Amarilys Manning LPN

## 2019-01-23 LAB
25(OH)D3+25(OH)D2 SERPL-MCNC: 76.9 NG/ML (ref 30–100)
ALBUMIN SERPL-MCNC: 4.6 G/DL (ref 3.6–4.8)
ALBUMIN/GLOB SERPL: 1.4 {RATIO} (ref 1.2–2.2)
ALP SERPL-CCNC: 90 IU/L (ref 39–117)
ALT SERPL-CCNC: 20 IU/L (ref 0–44)
APPEARANCE UR: CLEAR
AST SERPL-CCNC: 11 IU/L (ref 0–40)
BASOPHILS # BLD AUTO: 0 X10E3/UL (ref 0–0.2)
BASOPHILS NFR BLD AUTO: 0 %
BILIRUB SERPL-MCNC: 0.5 MG/DL (ref 0–1.2)
BILIRUB UR QL STRIP: NEGATIVE
BUN SERPL-MCNC: 13 MG/DL (ref 8–27)
BUN/CREAT SERPL: 11 (ref 10–24)
CALCIUM SERPL-MCNC: 9.6 MG/DL (ref 8.6–10.2)
CHLORIDE SERPL-SCNC: 103 MMOL/L (ref 96–106)
CHOLEST SERPL-MCNC: 116 MG/DL (ref 100–199)
CO2 SERPL-SCNC: 26 MMOL/L (ref 20–29)
COLOR UR: YELLOW
CREAT SERPL-MCNC: 1.17 MG/DL (ref 0.76–1.27)
EOSINOPHIL # BLD AUTO: 0.2 X10E3/UL (ref 0–0.4)
EOSINOPHIL NFR BLD AUTO: 3 %
ERYTHROCYTE [DISTWIDTH] IN BLOOD BY AUTOMATED COUNT: 13.2 % (ref 12.3–15.4)
EST. AVERAGE GLUCOSE BLD GHB EST-MCNC: 108 MG/DL
GLOBULIN SER CALC-MCNC: 3.3 G/DL (ref 1.5–4.5)
GLUCOSE SERPL-MCNC: 95 MG/DL (ref 65–99)
GLUCOSE UR QL: NEGATIVE
HBA1C MFR BLD: 5.4 % (ref 4.8–5.6)
HCT VFR BLD AUTO: 44.7 % (ref 37.5–51)
HDLC SERPL-MCNC: 27 MG/DL
HGB BLD-MCNC: 15.1 G/DL (ref 13–17.7)
HGB UR QL STRIP: NEGATIVE
IMM GRANULOCYTES # BLD AUTO: 0 X10E3/UL (ref 0–0.1)
IMM GRANULOCYTES NFR BLD AUTO: 0 %
INTERPRETATION, 910389: NORMAL
KETONES UR QL STRIP: NEGATIVE
LDLC SERPL CALC-MCNC: 48 MG/DL (ref 0–99)
LEUKOCYTE ESTERASE UR QL STRIP: NEGATIVE
LYMPHOCYTES # BLD AUTO: 1.4 X10E3/UL (ref 0.7–3.1)
LYMPHOCYTES NFR BLD AUTO: 25 %
MCH RBC QN AUTO: 28.9 PG (ref 26.6–33)
MCHC RBC AUTO-ENTMCNC: 33.8 G/DL (ref 31.5–35.7)
MCV RBC AUTO: 86 FL (ref 79–97)
MICRO URNS: NORMAL
MONOCYTES # BLD AUTO: 0.5 X10E3/UL (ref 0.1–0.9)
MONOCYTES NFR BLD AUTO: 8 %
NEUTROPHILS # BLD AUTO: 3.6 X10E3/UL (ref 1.4–7)
NEUTROPHILS NFR BLD AUTO: 64 %
NITRITE UR QL STRIP: NEGATIVE
PH UR STRIP: 7.5 [PH] (ref 5–7.5)
PLATELET # BLD AUTO: 192 X10E3/UL (ref 150–379)
POTASSIUM SERPL-SCNC: 4.5 MMOL/L (ref 3.5–5.2)
PROT SERPL-MCNC: 7.9 G/DL (ref 6–8.5)
PROT UR QL STRIP: NEGATIVE
PSA SERPL-MCNC: 0.9 NG/ML (ref 0–4)
RBC # BLD AUTO: 5.23 X10E6/UL (ref 4.14–5.8)
REFLEX CRITERIA: NORMAL
SODIUM SERPL-SCNC: 142 MMOL/L (ref 134–144)
SP GR UR: 1.02 (ref 1–1.03)
TRIGL SERPL-MCNC: 206 MG/DL (ref 0–149)
TSH SERPL DL<=0.005 MIU/L-ACNC: 2.67 UIU/ML (ref 0.45–4.5)
UROBILINOGEN UR STRIP-MCNC: 0.2 MG/DL (ref 0.2–1)
VIT B12 SERPL-MCNC: 429 PG/ML (ref 232–1245)
VLDLC SERPL CALC-MCNC: 41 MG/DL (ref 5–40)
WBC # BLD AUTO: 5.7 X10E3/UL (ref 3.4–10.8)

## 2019-01-23 NOTE — PROGRESS NOTES
Please inform son, 
 all results including blood count, kidney and liver function, thyroid profile, prostate cancer screen,vitamin b12,vitamin D average blood sugar ( diabetes screening), urine, are normal 
Cholesterol results still shows elevated Triglyceride, to take his medicine regularly and watch diet strictly 
thanks

## 2019-01-25 NOTE — PROGRESS NOTES
Outbound call to patients son. Patients name and  verified. Reviewed recent labs with son. Advised of provider recommendations. He verbalized understanding.

## 2019-02-14 RX ORDER — CHOLECALCIFEROL (VITAMIN D3) 125 MCG
CAPSULE ORAL
Qty: 100 CAP | Refills: 0 | Status: SHIPPED | OUTPATIENT
Start: 2019-02-14 | End: 2019-05-08 | Stop reason: SDUPTHER

## 2019-03-21 ENCOUNTER — DOCUMENTATION ONLY (OUTPATIENT)
Dept: FAMILY MEDICINE CLINIC | Age: 69
End: 2019-03-21

## 2019-03-25 ENCOUNTER — DOCUMENTATION ONLY (OUTPATIENT)
Dept: FAMILY MEDICINE CLINIC | Age: 69
End: 2019-03-25

## 2019-03-31 DIAGNOSIS — J06.9 VIRAL URI: ICD-10-CM

## 2019-03-31 RX ORDER — MONTELUKAST SODIUM 10 MG/1
TABLET ORAL
Qty: 30 TAB | Refills: 1 | Status: SHIPPED | OUTPATIENT
Start: 2019-03-31 | End: 2019-04-23 | Stop reason: SDUPTHER

## 2019-04-17 RX ORDER — ATORVASTATIN CALCIUM 10 MG/1
TABLET, FILM COATED ORAL
Qty: 30 TAB | Refills: 5 | Status: SHIPPED | OUTPATIENT
Start: 2019-04-17 | End: 2019-10-03 | Stop reason: DRUGHIGH

## 2019-04-23 DIAGNOSIS — J06.9 VIRAL URI: ICD-10-CM

## 2019-04-23 RX ORDER — MONTELUKAST SODIUM 10 MG/1
TABLET ORAL
Qty: 30 TAB | Refills: 0 | Status: SHIPPED | OUTPATIENT
Start: 2019-04-23 | End: 2019-05-22 | Stop reason: SDUPTHER

## 2019-04-24 RX ORDER — CROMOLYN SODIUM 40 MG/ML
SOLUTION/ DROPS OPHTHALMIC
Qty: 10 ML | Refills: 1 | Status: SHIPPED | OUTPATIENT
Start: 2019-04-24 | End: 2019-06-08 | Stop reason: SDUPTHER

## 2019-05-01 ENCOUNTER — APPOINTMENT (OUTPATIENT)
Dept: CT IMAGING | Age: 69
End: 2019-05-01
Attending: EMERGENCY MEDICINE
Payer: MEDICAID

## 2019-05-01 ENCOUNTER — HOSPITAL ENCOUNTER (OUTPATIENT)
Age: 69
Setting detail: OBSERVATION
Discharge: HOME | End: 2019-05-02
Attending: EMERGENCY MEDICINE | Admitting: INTERNAL MEDICINE
Payer: MEDICAID

## 2019-05-01 DIAGNOSIS — N17.9 AKI (ACUTE KIDNEY INJURY) (HCC): ICD-10-CM

## 2019-05-01 DIAGNOSIS — R25.1 SHAKING: ICD-10-CM

## 2019-05-01 DIAGNOSIS — R41.0 DELIRIUM: Primary | ICD-10-CM

## 2019-05-01 PROBLEM — G93.40 ACUTE ENCEPHALOPATHY: Status: ACTIVE | Noted: 2019-05-01

## 2019-05-01 LAB
ALBUMIN SERPL-MCNC: 3.9 G/DL (ref 3.5–5)
ALBUMIN/GLOB SERPL: 0.9 {RATIO} (ref 1.1–2.2)
ALP SERPL-CCNC: 87 U/L (ref 45–117)
ALT SERPL-CCNC: 57 U/L (ref 12–78)
AMPHET UR QL SCN: NEGATIVE
ANION GAP SERPL CALC-SCNC: 9 MMOL/L (ref 5–15)
APPEARANCE UR: CLEAR
ARTERIAL PATENCY WRIST A: ABNORMAL
AST SERPL-CCNC: 50 U/L (ref 15–37)
ATRIAL RATE: 66 BPM
BARBITURATES UR QL SCN: NEGATIVE
BASE EXCESS BLDV CALC-SCNC: 4 MMOL/L
BASOPHILS # BLD: 0.1 K/UL (ref 0–0.1)
BASOPHILS NFR BLD: 1 % (ref 0–1)
BDY SITE: ABNORMAL
BENZODIAZ UR QL: NEGATIVE
BILIRUB SERPL-MCNC: 0.4 MG/DL (ref 0.2–1)
BILIRUB UR QL: NEGATIVE
BUN SERPL-MCNC: 14 MG/DL (ref 6–20)
BUN/CREAT SERPL: 10 (ref 12–20)
CALCIUM SERPL-MCNC: 10 MG/DL (ref 8.5–10.1)
CALCULATED P AXIS, ECG09: 74 DEGREES
CALCULATED R AXIS, ECG10: 52 DEGREES
CALCULATED T AXIS, ECG11: 55 DEGREES
CANNABINOIDS UR QL SCN: NEGATIVE
CHLORIDE SERPL-SCNC: 105 MMOL/L (ref 97–108)
CO2 SERPL-SCNC: 24 MMOL/L (ref 21–32)
COCAINE UR QL SCN: NEGATIVE
COLOR UR: NORMAL
CREAT SERPL-MCNC: 1.39 MG/DL (ref 0.7–1.3)
DIAGNOSIS, 93000: NORMAL
DIFFERENTIAL METHOD BLD: NORMAL
DRUG SCRN COMMENT,DRGCM: NORMAL
EOSINOPHIL # BLD: 0.4 K/UL (ref 0–0.4)
EOSINOPHIL NFR BLD: 5 % (ref 0–7)
ERYTHROCYTE [DISTWIDTH] IN BLOOD BY AUTOMATED COUNT: 12.3 % (ref 11.5–14.5)
ETHANOL SERPL-MCNC: <10 MG/DL
GAS FLOW.O2 O2 DELIVERY SYS: ABNORMAL L/MIN
GLOBULIN SER CALC-MCNC: 4.2 G/DL (ref 2–4)
GLUCOSE SERPL-MCNC: 133 MG/DL (ref 65–100)
GLUCOSE UR STRIP.AUTO-MCNC: NEGATIVE MG/DL
HCO3 BLDV-SCNC: 28.4 MMOL/L (ref 23–28)
HCT VFR BLD AUTO: 48.2 % (ref 36.6–50.3)
HGB BLD-MCNC: 16.3 G/DL (ref 12.1–17)
HGB UR QL STRIP: NEGATIVE
IMM GRANULOCYTES # BLD AUTO: 0 K/UL (ref 0–0.04)
IMM GRANULOCYTES NFR BLD AUTO: 0 % (ref 0–0.5)
INR PPP: 1 (ref 0.9–1.1)
KETONES UR QL STRIP.AUTO: NEGATIVE MG/DL
LEUKOCYTE ESTERASE UR QL STRIP.AUTO: NEGATIVE
LIPASE SERPL-CCNC: 186 U/L (ref 73–393)
LYMPHOCYTES # BLD: 2.9 K/UL (ref 0.8–3.5)
LYMPHOCYTES NFR BLD: 31 % (ref 12–49)
MAGNESIUM SERPL-MCNC: 2.3 MG/DL (ref 1.6–2.4)
MCH RBC QN AUTO: 28.8 PG (ref 26–34)
MCHC RBC AUTO-ENTMCNC: 33.8 G/DL (ref 30–36.5)
MCV RBC AUTO: 85.3 FL (ref 80–99)
METHADONE UR QL: NEGATIVE
MONOCYTES # BLD: 0.8 K/UL (ref 0–1)
MONOCYTES NFR BLD: 9 % (ref 5–13)
NEUTS SEG # BLD: 4.9 K/UL (ref 1.8–8)
NEUTS SEG NFR BLD: 54 % (ref 32–75)
NITRITE UR QL STRIP.AUTO: NEGATIVE
NRBC # BLD: 0 K/UL (ref 0–0.01)
NRBC BLD-RTO: 0 PER 100 WBC
OPIATES UR QL: NEGATIVE
P-R INTERVAL, ECG05: 144 MS
PCO2 BLDV: 46.4 MMHG (ref 41–51)
PCP UR QL: NEGATIVE
PH BLDV: 7.4 [PH] (ref 7.32–7.42)
PH UR STRIP: 6.5 [PH] (ref 5–8)
PLATELET # BLD AUTO: 231 K/UL (ref 150–400)
PMV BLD AUTO: 11.3 FL (ref 8.9–12.9)
PO2 BLDV: 21 MMHG (ref 25–40)
POTASSIUM SERPL-SCNC: 4.1 MMOL/L (ref 3.5–5.1)
PROT SERPL-MCNC: 8.1 G/DL (ref 6.4–8.2)
PROT UR STRIP-MCNC: NEGATIVE MG/DL
PROTHROMBIN TIME: 10 SEC (ref 9–11.1)
Q-T INTERVAL, ECG07: 394 MS
QRS DURATION, ECG06: 82 MS
QTC CALCULATION (BEZET), ECG08: 413 MS
RBC # BLD AUTO: 5.65 M/UL (ref 4.1–5.7)
SAO2 % BLDV: 34 % (ref 65–88)
SODIUM SERPL-SCNC: 138 MMOL/L (ref 136–145)
SP GR UR REFRACTOMETRY: 1.02 (ref 1–1.03)
SPECIMEN TYPE: ABNORMAL
TROPONIN I SERPL-MCNC: <0.05 NG/ML
UROBILINOGEN UR QL STRIP.AUTO: 0.2 EU/DL (ref 0.2–1)
VENTRICULAR RATE, ECG03: 66 BPM
WBC # BLD AUTO: 9.2 K/UL (ref 4.1–11.1)

## 2019-05-01 PROCEDURE — 96372 THER/PROPH/DIAG INJ SC/IM: CPT

## 2019-05-01 PROCEDURE — 96361 HYDRATE IV INFUSION ADD-ON: CPT

## 2019-05-01 PROCEDURE — 74011250636 HC RX REV CODE- 250/636: Performed by: INTERNAL MEDICINE

## 2019-05-01 PROCEDURE — 74011250637 HC RX REV CODE- 250/637: Performed by: INTERNAL MEDICINE

## 2019-05-01 PROCEDURE — 99218 HC RM OBSERVATION: CPT

## 2019-05-01 PROCEDURE — 36415 COLL VENOUS BLD VENIPUNCTURE: CPT

## 2019-05-01 PROCEDURE — 51701 INSERT BLADDER CATHETER: CPT

## 2019-05-01 PROCEDURE — 84484 ASSAY OF TROPONIN QUANT: CPT

## 2019-05-01 PROCEDURE — 81003 URINALYSIS AUTO W/O SCOPE: CPT

## 2019-05-01 PROCEDURE — 74011250636 HC RX REV CODE- 250/636: Performed by: EMERGENCY MEDICINE

## 2019-05-01 PROCEDURE — 99285 EMERGENCY DEPT VISIT HI MDM: CPT

## 2019-05-01 PROCEDURE — 70450 CT HEAD/BRAIN W/O DYE: CPT

## 2019-05-01 PROCEDURE — 85025 COMPLETE CBC W/AUTO DIFF WBC: CPT

## 2019-05-01 PROCEDURE — 80053 COMPREHEN METABOLIC PANEL: CPT

## 2019-05-01 PROCEDURE — 96374 THER/PROPH/DIAG INJ IV PUSH: CPT

## 2019-05-01 PROCEDURE — 80307 DRUG TEST PRSMV CHEM ANLYZR: CPT

## 2019-05-01 PROCEDURE — 77030005563 HC CATH URETH INT MMGH -A

## 2019-05-01 PROCEDURE — 83690 ASSAY OF LIPASE: CPT

## 2019-05-01 PROCEDURE — 83735 ASSAY OF MAGNESIUM: CPT

## 2019-05-01 PROCEDURE — 82803 BLOOD GASES ANY COMBINATION: CPT

## 2019-05-01 PROCEDURE — 85610 PROTHROMBIN TIME: CPT

## 2019-05-01 PROCEDURE — 93005 ELECTROCARDIOGRAM TRACING: CPT

## 2019-05-01 RX ORDER — SODIUM CHLORIDE 0.9 % (FLUSH) 0.9 %
5-40 SYRINGE (ML) INJECTION AS NEEDED
Status: DISCONTINUED | OUTPATIENT
Start: 2019-05-01 | End: 2019-05-02 | Stop reason: HOSPADM

## 2019-05-01 RX ORDER — ACETAMINOPHEN 325 MG/1
650 TABLET ORAL
Status: DISCONTINUED | OUTPATIENT
Start: 2019-05-01 | End: 2019-05-02 | Stop reason: HOSPADM

## 2019-05-01 RX ORDER — ENOXAPARIN SODIUM 100 MG/ML
40 INJECTION SUBCUTANEOUS EVERY 24 HOURS
Status: DISCONTINUED | OUTPATIENT
Start: 2019-05-01 | End: 2019-05-02 | Stop reason: HOSPADM

## 2019-05-01 RX ORDER — SODIUM CHLORIDE 9 MG/ML
100 INJECTION, SOLUTION INTRAVENOUS CONTINUOUS
Status: DISCONTINUED | OUTPATIENT
Start: 2019-05-01 | End: 2019-05-02 | Stop reason: HOSPADM

## 2019-05-01 RX ORDER — LORAZEPAM 2 MG/ML
1 INJECTION INTRAMUSCULAR
Status: COMPLETED | OUTPATIENT
Start: 2019-05-01 | End: 2019-05-01

## 2019-05-01 RX ORDER — CROMOLYN SODIUM 40 MG/ML
1 SOLUTION/ DROPS OPHTHALMIC 4 TIMES DAILY
Status: DISCONTINUED | OUTPATIENT
Start: 2019-05-01 | End: 2019-05-02 | Stop reason: HOSPADM

## 2019-05-01 RX ORDER — PANTOPRAZOLE SODIUM 40 MG/1
40 TABLET, DELAYED RELEASE ORAL DAILY
Status: DISCONTINUED | OUTPATIENT
Start: 2019-05-01 | End: 2019-05-02 | Stop reason: HOSPADM

## 2019-05-01 RX ORDER — ATORVASTATIN CALCIUM 10 MG/1
10 TABLET, FILM COATED ORAL DAILY
Status: DISCONTINUED | OUTPATIENT
Start: 2019-05-01 | End: 2019-05-02 | Stop reason: HOSPADM

## 2019-05-01 RX ORDER — LANOLIN ALCOHOL/MO/W.PET/CERES
500 CREAM (GRAM) TOPICAL DAILY
Status: DISCONTINUED | OUTPATIENT
Start: 2019-05-01 | End: 2019-05-02 | Stop reason: HOSPADM

## 2019-05-01 RX ORDER — MELATONIN
5000 DAILY
Status: DISCONTINUED | OUTPATIENT
Start: 2019-05-01 | End: 2019-05-02 | Stop reason: HOSPADM

## 2019-05-01 RX ORDER — ATENOLOL 25 MG/1
25 TABLET ORAL DAILY
Status: DISCONTINUED | OUTPATIENT
Start: 2019-05-01 | End: 2019-05-02 | Stop reason: HOSPADM

## 2019-05-01 RX ORDER — SODIUM CHLORIDE 0.9 % (FLUSH) 0.9 %
5-40 SYRINGE (ML) INJECTION EVERY 8 HOURS
Status: DISCONTINUED | OUTPATIENT
Start: 2019-05-01 | End: 2019-05-02 | Stop reason: HOSPADM

## 2019-05-01 RX ORDER — MONTELUKAST SODIUM 10 MG/1
10 TABLET ORAL
Status: DISCONTINUED | OUTPATIENT
Start: 2019-05-01 | End: 2019-05-02 | Stop reason: HOSPADM

## 2019-05-01 RX ADMIN — VITAMIN D, TAB 1000IU (100/BT) 5000 UNITS: 25 TAB at 09:45

## 2019-05-01 RX ADMIN — Medication 10 ML: at 06:47

## 2019-05-01 RX ADMIN — Medication 10 ML: at 21:34

## 2019-05-01 RX ADMIN — MONTELUKAST SODIUM 10 MG: 10 TABLET, FILM COATED ORAL at 21:22

## 2019-05-01 RX ADMIN — ENOXAPARIN SODIUM 40 MG: 40 INJECTION SUBCUTANEOUS at 07:04

## 2019-05-01 RX ADMIN — SODIUM CHLORIDE 100 ML/HR: 900 INJECTION, SOLUTION INTRAVENOUS at 21:44

## 2019-05-01 RX ADMIN — SODIUM CHLORIDE 100 ML/HR: 900 INJECTION, SOLUTION INTRAVENOUS at 06:47

## 2019-05-01 RX ADMIN — LORAZEPAM 1 MG: 2 INJECTION INTRAMUSCULAR; INTRAVENOUS at 03:13

## 2019-05-01 RX ADMIN — ATORVASTATIN CALCIUM 10 MG: 10 TABLET, FILM COATED ORAL at 09:42

## 2019-05-01 RX ADMIN — CROMOLYN SODIUM 1 DROP: 40 SOLUTION/ DROPS OPHTHALMIC at 14:45

## 2019-05-01 RX ADMIN — CYANOCOBALAMIN TAB 500 MCG 500 MCG: 500 TAB at 09:42

## 2019-05-01 RX ADMIN — ATENOLOL 25 MG: 25 TABLET ORAL at 09:42

## 2019-05-01 RX ADMIN — PANTOPRAZOLE SODIUM 40 MG: 40 TABLET, DELAYED RELEASE ORAL at 09:42

## 2019-05-01 RX ADMIN — CROMOLYN SODIUM 1 DROP: 40 SOLUTION/ DROPS OPHTHALMIC at 17:44

## 2019-05-01 RX ADMIN — CROMOLYN SODIUM 1 DROP: 40 SOLUTION/ DROPS OPHTHALMIC at 21:22

## 2019-05-01 RX ADMIN — SODIUM CHLORIDE 1000 ML: 900 INJECTION, SOLUTION INTRAVENOUS at 04:30

## 2019-05-01 NOTE — PROGRESS NOTES
Spiritual Care Assessment/Progress Note [address] 
 
 
NAME: Winnie Becerril      MRN: [id number] AGE: 81 y.o. SEX: male Sikhism Affiliation: Non Jewish  
Language: [place] 5/1/2019     Total Time (in minutes): 20 Spiritual Assessment begun in [facility] through conversation with: 
  
    []Patient        [] Family    [] Friend(s) Reason for Consult: Initial/Spiritual assessment, patient floor Spiritual beliefs: (Please include comment if needed) 
   [] Identifies with a josh tradition:     
   [] Supported by a josh community:        
   [] Claims no spiritual orientation:       
   [] Seeking spiritual identity:            
   [] Adheres to an individual form of spirituality:       
   [x] Not able to assess:                   
 
    
Identified resources for coping:  
   [] Prayer                           
   [] Music                  [] Guided Imagery 
   [] Family/friends                 [] Pet visits [] Devotional reading                         [x] Unknown 
   [] Other:                                        
 
 
Interventions offered during this visit: (See comments for more details) Patient Interventions: Other (comment) Plan of Care: 
 
 [] Support spiritual and/or cultural needs  
 [] Support AMD and/or advance care planning process    
 [] Support grieving process 
 [] Coordinate Rites and/or Rituals  
 [] Coordination with community clergy [] No spiritual needs identified at this time 
 [] Detailed Plan of Care below (See Comments)  [] Make referral to Music Therapy 
[] Make referral to Pet Therapy    
[] Make referral to Addiction services 
[] Make referral to [facility] 
[] Make referral to Spiritual Care Partner 
[] No future visits requested       
[x] Follow up visits as needed Comments: Attempted Initial Spiritual Assessment in Room 606/02. Unable to assess patients needs,patient was being attend to by staff.  No family present at this time. Chaplains will follow as able and/or as needed. Rev. Gudelia Corcoran. Mercy Aguirre

## 2019-05-01 NOTE — ROUTINE PROCESS
TRANSFER - OUT REPORT: 
 
Verbal report given to SHAQ Pritchett(name) on Mon B Bobby  being transferred to (unit) for routine progression of care Report consisted of patients Situation, Background, Assessment and  
Recommendations(SBAR). Information from the following report(s) SBAR, Kardex, ED Summary and Recent Results was reviewed with the receiving nurse. Lines:  
Peripheral IV 05/01/19 Right Forearm (Active) Site Assessment Clean, dry, & intact 5/1/2019  3:10 AM  
Phlebitis Assessment 0 5/1/2019  3:10 AM  
Infiltration Assessment 0 5/1/2019  3:10 AM  
Dressing Status Clean, dry, & intact 5/1/2019  3:10 AM  
Dressing Type Transparent 5/1/2019  3:10 AM  
Hub Color/Line Status Pink;Flushed;Patent 5/1/2019  3:10 AM  
Action Taken Blood drawn 5/1/2019  3:10 AM  
Alcohol Cap Used Yes 5/1/2019  3:10 AM  
  
 
Opportunity for questions and clarification was provided. Patient transported with: 
 [employer]

## 2019-05-01 NOTE — ED PROVIDER NOTES
81 y.o. Syriac male with past medical history significant for HTN, HLD, presents to the ED accompanied by son for evaluation of altered mental status. Son states that patient was in his usual state of health when he left for work yesterday. When son got back from work late last night/ early this morning, patient was ill and requesting to go to the hospital. When son tried to ask patient what was wrong, patient was not complaining of pain anywhere and was unable to say exactly what his symptoms were. In the ED whenever son asks patient a question, patient states \"I don't know\". Son notes that patient otherwise makes sense when he speaks. Patient denies any EtOH intake, and he specifically denies chest pain, shortness of breath, headache, nausea, or vomiting. HPI overall limited due to this change in patient's mental status. Review of medical records indicates that patient is currently being worked up for sensory ataxia. 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 He had negative RPR, normal homocystine and methylmalonic acid levels. EMG and NCV showed sensory neuropathy in both lower extremities. There are no other acute medical concerns at this time. Social hx: Denies Tobacco use; Denies EtOH use; Denies Illicit Drug Abuse PCP: Napoleon Queen MD 
 
Full history, physical exam, and ROS unable to be obtained due to: language barrier and confusion. Note written by Daphnie Kemp, as dictated by Shiloh Wilson MD 3:06 AM  
 
The history is provided by the patient and a relative (son). The history is limited by the condition of the patient and a language barrier. A  was used (Son serving as Syriac ). Past Medical History:  
Diagnosis Date  Acid indigestion  B12 deficiency 08/08/13  
 level 208  Hypercholesterolemia  Hypertension  Stroke ([address] History reviewed. No pertinent surgical history. History reviewed. No pertinent family history. Social History Socioeconomic History  Marital status:  Spouse name: Not on file  Number of children: Not on file  Years of education: Not on file  Highest education level: Not on file Occupational History  Not on file Social Needs  Financial resource strain: Not on file  Food insecurity:  
  Worry: Not on file Inability: Not on file  Transportation needs:  
  Medical: Not on file Non-medical: Not on file Tobacco Use  Smoking status: Never Smoker  Smokeless tobacco: Current User Types: Snuff Substance and Sexual Activity  Alcohol use: No  
  Alcohol/week: 0.0 oz  
  Comment: Social.  Patient currently is not drinking for 4 months  Drug use: No  
 Sexual activity: Never Lifestyle  Physical activity:  
  Days per week: Not on file Minutes per session: Not on file  Stress: Not on file Relationships  Social connections:  
  Talks on phone: Not on file Gets together: Not on file Attends Taoist service: Not on file Active member of club or organization: Not on file Attends meetings of clubs or organizations: Not on file Relationship status: Not on file  Intimate partner violence:  
  Fear of current or ex partner: Not on file Emotionally abused: Not on file Physically abused: Not on file Forced sexual activity: Not on file Other Topics Concern  Not on file Social History Narrative  Not on file ALLERGIES: Patient has no known allergies. Review of Systems Unable to perform ROS: Mental status change Vitals:  
 05/01/19 0400 05/01/19 0430 05/01/19 0500 05/01/19 0530 BP: 108/74 118/79 114/81 112/81 Pulse: 94 98 85 90 Resp: 21 23 21 23 SpO2: 98% 100% 100% 100% Physical Exam  
Constitutional: He appears well-developed and well-nourished. Hyperventilating HENT:  
Head: Normocephalic. Mouth/Throat: Oropharynx is clear and moist.  
Eyes: Conjunctivae are normal.  
Neck: Normal range of motion. Neck supple. Cardiovascular: Normal rate and regular rhythm. No murmur heard. Pulmonary/Chest: Breath sounds normal. No respiratory distress. Hyperventilating Abdominal: Soft. Bowel sounds are normal. There is no tenderness. Musculoskeletal: Normal range of motion. Denies having pain Neurological: He is alert. He displays no Babinski's sign on the right side. He displays no Babinski's sign on the left side. Reflex Scores: 
     Patellar reflexes are 2+ on the right side and 2+ on the left side. Whenever he is touched, he shakes and then stops and is able to look up at examiner. Answers \"I don't know\" when asked questions. Able to follow commands. Son reports that patient is speaking normally, not slurred. Skin: Skin is warm. Capillary refill takes less than 2 seconds. No rash noted. Note written by Jean Claude Clark. Miah Meraz, as dictated by Jessica Clemente MD 3:06 AM   
 
MDM Number of Diagnoses or Management Options SIRI (acute kidney injury) [facility]):  
Delirium:  
Shaking:  
Diagnosis management comments: Patient presenting ER confused, hyperventilating with intermittent shaking but no true seizure-like activity. Patient's son states the patient keeps reporting I don't know. Is able to follow commands but not answer questions appropriately. No report of any fevers or chills. Patient was normal earlier in the day. No slurred speech or focal neurologic deficits. Symptoms are inconsistent with stroke. Symptoms do not seem to be true seizure-like activity. Shaking versus pseudoseizures. Not jayne tonic jerks Labs and CT head unremarkable Amount and/or Complexity of Data Reviewed Clinical lab tests: reviewed Tests in the radiology section of CPT®: reviewed Tests in the medicine section of CPT®: reviewed Procedures ED EKG interpretation: 
Rhythm: normal sinus rhythm; and regular . Rate (approx.): 68 bpm; Axis: normal; ST/T wave: normal;  
Note written by Tanay Lizarraga. Pat Coffman, as dictated by Radha Doyle MD 3:06 AM  
 
  
Hospitalist Tete for Admission 4:45 AM 
 
ED Room Number: COLTON/COLTON Patient Name and age: Angeilca Rosales 81 y.o.  male Working Diagnosis: 1. Delirium 2. SIRI (acute kidney injury) ([address] 3. Shaking Readmission: no 
Isolation Requirements:  no 
Recommended Level of Care:  med/surg Code Status:  Full code Other:   
 
Recent Results (from the past 24 hour(s)) EKG, 12 LEAD, INITIAL Collection Time: 05/01/19  3:02 AM  
Result Value Ref Range Ventricular Rate 72 BPM  
 Atrial Rate 72 BPM  
 P-R Interval 140 ms QRS Duration 80 ms  
 Q-T Interval 392 ms QTC Calculation (Bezet) 429 ms Calculated P Axis 74 degrees Calculated R Axis 55 degrees Calculated T Axis 60 degrees Diagnosis ** Poor data quality, interpretation may be adversely affected Sinus rhythm with occasional and consecutive premature ventricular complexes When compared with ECG of 29-MAY-2017 10:20, 
premature ventricular complexes are now present ST elevation now present in Inferior leads CBC WITH AUTOMATED DIFF Collection Time: 05/01/19  3:13 AM  
Result Value Ref Range WBC 9.2 4.1 - 11.1 K/uL  
 RBC 5.65 4.10 - 5.70 M/uL  
 HGB 16.3 12.1 - 17.0 g/dL HCT 48.2 36.6 - 50.3 % MCV 85.3 80.0 - 99.0 FL  
 MCH 28.8 26.0 - 34.0 PG  
 MCHC 33.8 30.0 - 36.5 g/dL  
 RDW 12.3 11.5 - 14.5 % PLATELET 078 515 - 924 K/uL MPV 11.3 8.9 - 12.9 FL  
 NRBC 0.0 0  WBC ABSOLUTE NRBC 0.00 0.00 - 0.01 K/uL NEUTROPHILS 54 32 - 75 % LYMPHOCYTES 31 12 - 49 % MONOCYTES 9 5 - 13 % EOSINOPHILS 5 0 - 7 % BASOPHILS 1 0 - 1 % IMMATURE GRANULOCYTES 0 0.0 - 0.5 % ABS. NEUTROPHILS 4.9 1.8 - 8.0 K/UL  
 ABS. LYMPHOCYTES 2.9 0.8 - 3.5 K/UL ABS. MONOCYTES 0.8 0.0 - 1.0 K/UL  
 ABS. EOSINOPHILS 0.4 0.0 - 0.4 K/UL  
 ABS. BASOPHILS 0.1 0.0 - 0.1 K/UL  
 ABS. IMM. GRANS. 0.0 0.00 - 0.04 K/UL  
 DF AUTOMATED PROTHROMBIN TIME + INR Collection Time: 05/01/19  3:13 AM  
Result Value Ref Range INR 1.0 0.9 - 1.1 Prothrombin time 10.0 9.0 - 11.1 sec METABOLIC PANEL, COMPREHENSIVE Collection Time: 05/01/19  3:13 AM  
Result Value Ref Range Sodium 138 136 - 145 mmol/L Potassium 4.1 3.5 - 5.1 mmol/L Chloride 105 97 - 108 mmol/L  
 CO2 24 21 - 32 mmol/L Anion gap 9 5 - 15 mmol/L Glucose 133 (H) 65 - 100 mg/dL BUN 14 6 - 20 MG/DL Creatinine 1.39 (H) 0.70 - 1.30 MG/DL  
 BUN/Creatinine ratio 10 (L) 12 - 20 GFR est AA >60 >60 ml/min/1.73m2 GFR est non-AA 51 (L) >60 ml/min/1.73m2 Calcium 10.0 8.5 - 10.1 MG/DL Bilirubin, total 0.4 0.2 - 1.0 MG/DL  
 ALT (SGPT) 57 12 - 78 U/L  
 AST (SGOT) 50 (H) 15 - 37 U/L Alk. phosphatase 87 45 - 117 U/L Protein, total 8.1 6.4 - 8.2 g/dL Albumin 3.9 3.5 - 5.0 g/dL Globulin 4.2 (H) 2.0 - 4.0 g/dL A-G Ratio 0.9 (L) 1.1 - 2.2 LIPASE Collection Time: 05/01/19  3:13 AM  
Result Value Ref Range Lipase 186 73 - 393 U/L MAGNESIUM Collection Time: 05/01/19  3:13 AM  
Result Value Ref Range Magnesium 2.3 1.6 - 2.4 mg/dL ETHYL ALCOHOL Collection Time: 05/01/19  3:13 AM  
Result Value Ref Range ALCOHOL(ETHYL),SERUM <10 <10 MG/DL  
TROPONIN I Collection Time: 05/01/19  3:13 AM  
Result Value Ref Range Troponin-I, Qt. <0.05 <0.05 ng/mL POC VENOUS BLOOD GAS Collection Time: 05/01/19  3:45 AM  
Result Value Ref Range Device: ROOM AIR    
 pH, venous (POC) 7.396 7.32 - 7.42    
 pCO2, venous (POC) 46.4 41 - 51 MMHG  
 pO2, venous (POC) 21 (L) 25 - 40 mmHg HCO3, venous (POC) 28.4 (H) 23.0 - 28.0 MMOL/L  
 sO2, venous (POC) 34 (L) 65 - 88 % Base excess, venous (POC) 4 mmol/L Allens test (POC) N/A  Site OTHER    
 Specimen type (POC) VENOUS BLOOD URINALYSIS W/ RFLX MICROSCOPIC Collection Time: 05/01/19  5:00 AM  
Result Value Ref Range Color YELLOW/STRAW Appearance CLEAR CLEAR Specific gravity 1.016 1.003 - 1.030    
 pH (UA) 6.5 5.0 - 8.0 Protein NEGATIVE  NEG mg/dL Glucose NEGATIVE  NEG mg/dL Ketone NEGATIVE  NEG mg/dL Bilirubin NEGATIVE  NEG Blood NEGATIVE  NEG Urobilinogen 0.2 0.2 - 1.0 EU/dL Nitrites NEGATIVE  NEG Leukocyte Esterase NEGATIVE  NEG Ct Head Wo Cont Result Date: 5/1/2019 INDICATION: AMS EXAM:  HEAD CT WITHOUT CONTRAST COMPARISON: May 18, 2017 TECHNIQUE:  Routine noncontrast axial head CT was performed. Sagittal and coronal reconstructions were generated. CT dose reduction was achieved through use of a standardized protocol tailored for this examination and automatic exposure control for dose modulation. FINDINGS: Ventricles: Midline, no hydrocephalus. Intracranial Hemorrhage: None. Brain Parenchyma/Brainstem: Normal for age. Basal Cisterns: Normal. Paranasal Sinuses: Visualized sinuses are clear. Additional Comments: N/A. IMPRESSION: No acute process.

## 2019-05-01 NOTE — PROGRESS NOTES
Pt seen and examined at bedside, history revisited, charts reviewed. Agree with Dr Leny Mo 69M from [place], doesn't speak English, lives with his son, Son came back from work yesterday to see his father confused and acting strangely. Denies fever/rigors/ n/ v/ d/ changes in bowel or bladder/ consumption of anything unusual, no alcohol, drugs. Marc Goldsmith

## 2019-05-01 NOTE — PROGRESS NOTES
TRANSFER - IN REPORT: 
 
Verbal report received from [facility]) on Mon B Bobby  being received from ED(unit) for routine progression of care Report consisted of patients Situation, Background, Assessment and  
Recommendations(SBAR). Information from the following report(s) SBAR, Kardex, ED Summary, Intake/Output, MAR, Recent Results and Med Rec Status was reviewed with the receiving nurse. Opportunity for questions and clarification was provided. Assessment completed upon patients arrival to unit and care assumed.

## 2019-05-01 NOTE — H&P
History and Physical 
 
Patient: Maren Mathews MRN: [id number]  SSN: [id number] YOB: 1950  Age: 81 y.o. Sex: male Subjective: Maren Mathews is a  51-year-old [place] male who lives at home with his son and Daughter in law. Patient was in his usual state of health and mental status even as recently as the morning of April 30, 2019 when he went to [facility] with his son. Son went to work, and came back at 6 PM. Patient was already asleep. Around 2:30 AM this morning, patient was hurt granting by daughter-in-law. She told the sun to go check in on him. Son found him sitting in the living room. Patient stated to the son at that time that he did not feel well, and asked son to take him to the hospital. Son noted that patient Recognized him, but was saying I dont know to several questions asked. This is highly unusual for him, as patient does not have any memory deficits or cognitive deficits at baseline. Patient is alert at time of examination, and denies all pain. Patient denied dysuria, headache. He denies fever, chills. Son stated that patient has not been combative, but has been having memory issues. Son states that patient had no difficulty recognizing him. Son states that patient has no history of excessive alcohol intake. Patient is alert, oriented to person, place; patient stated that it was the first of the month but could not name the month or the year. Review of medical records indicates that patient is currently being worked up for sensory ataxia. 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 He had negative RPR, normal homocysteine and methylmalonic acid levels. EMG and NCV showed sensory neuropathy in both lower extremities. Past Medical History:  
Diagnosis Date  Acid indigestion  B12 deficiency 08/08/13  
 level 208  Hypercholesterolemia  Hypertension  Stroke ([facility] [address] History reviewed. No pertinent surgical history. Family History reviewed: Unknown by son Social History Tobacco Use  Smoking status: Never Smoker  Smokeless tobacco: Current User Types: Snuff Substance Use Topics  Alcohol use: No  
  Alcohol/week: 0.0 oz  
  Comment: Social.  Patient currently is not drinking for 4 months Prior to Admission medications Medication Sig Start Date End Date Taking? Authorizing Provider  
cromolyn (OPTICROM) 4 % ophthalmic solution INSTILL 1 DROP INTO EACH EYE 4 TIMES A DAY 4/24/19   Porsche Salcido MD  
montelukast (SINGULAIR) 10 mg tablet TAKE 1 TABLET BY MOUTH EVERY DAY 4/23/19   Porsche Salcido MD  
atorvastatin (LIPITOR) 10 mg tablet TAKE 1 TABLET BY MOUTH EVERY DAY 4/17/19   Porsche Salcido MD  
cholecalciferol (VITAMIN D3) 5,000 unit capsule TAKE 1 CAPSULE BY MOUTH EVERY DAY 2/14/19   Porsche Salcido MD  
Omeprazole delayed release (PRILOSEC D/R) 20 mg tablet TAKE 1 TABLET BY MOUTH EVERY DAY 1/22/19   Porsche Salcido MD  
atenolol (TENORMIN) 25 mg tablet TAKE 1 TABLET BY MOUTH EVERY DAY 1/11/19   Porsche Salcido MD  
cyanocobalamin (VITAMIN B12) 500 mcg tablet TAKE 1 TAB BY MOUTH DAILY. 8/31/16   Porsche Salcido MD  
  
 
No Known Allergies Review of Systems: A comprehensive review of systems was negative except for that written in the History of Present Illness. Patient denied all complaints. However, it should be noted that answers to questions by patient may be unreliable due to acute encephalopathy. Objective:  
 
Vitals:  
 05/01/19 0330 05/01/19 0400 05/01/19 0430 05/01/19 0500 BP: 120/68 108/74 118/79 114/81 Pulse: 70 94 98 85 Resp: 18 21 23 21 SpO2: 97% on room air 98% on room air 100% on room air 100% on room air Physical Exam: 
General:  Alert, cooperative, no distress, appears stated age. Eyes:  Conjunctivae/corneas clear. PERRL, EOMs intact. Mouth/Throat: Lips, mucosa, and tongue normal. Teeth and gums normal.  
Neck: Supple, symmetrical, trachea midline, no adenopathy, thyroid: no enlargment/tenderness/nodules, no carotid bruit and no JVD. Lungs:   Clear to auscultation bilaterally. Heart:  Regular rate and rhythm, S1, S2 normal, no murmur, click, rub or gallop. Abdomen:   Soft, non-tender. Bowel sounds normal. Nondistended. Extremities: Extremities normal, atraumatic, no cyanosis or edema. Pulses: 2+ and symmetric all extremities. Skin: Skin color, texture, turgor normal. No rashes or lesions Lymph nodes: Cervical, supraclavicular, and axillary nodes normal.  
Neurologic: CNII-XII intact. 5/5 strength throughout. Follows commands appropriately. Alert, oriented to person, place; patient stated that it was the first of the month but could not name the month or the year. Assessment:  
 
Hospital Problems  Date Reviewed: 8/7/2018 Codes Class Noted POA Acute encephalopathy ICD-10-CM: G93.40 ICD-9-CM: 348.30  5/1/2019 Unknown Plan:  
 
Assessment/plan: 1. Acute encephalopathy. Unclear etiology. There is no convincing evidence for acute infection. Neurology consultation. 2. Essential hypertension. Continue atenolol. 3. Hyperlipidemia. Continue Lipitor. 4. GERD . Continue PPI. 5. Peripheral neuropathy in bilateral lower extremities per nerve conduction studies and EMG. Signed By: Pili Gutierrez DO May 1, 2019

## 2019-05-01 NOTE — ED TRIAGE NOTES
Triage Note:  Patient's son states he was asleep, that his wife woke him because patient had gotten out of bed and gone into living area and could be heard making sounds. Patient did not respond to the son, son stated he was scared, patient would not talk to him and could not tell him what was wrong. Patient was brought immediately back to ED room as he was moving his body with uncontrollable jerking motions and was unresponsive. 0410:  Call was made to  to help try to get better idea of what was going on. [facility]  was on phone to speak on behalf of patient and MD.  Danielle Jade was unable to hear or understand patient enough to give good information. Son is at bedside, but rather than speak for patient, he continues to give his version of what he thinks happened. 8961:  Patient's son has returned to room, spoke with patient about needing urine specimen. Patient's son attempted to help him use urinal, but was unsuccessful. 
 
0502:  Patient was straight cathed in order to obtain urine, patient only offered 100ml urine. Patient's abdomen appears slightly distended and tight with palpation, MD notified.

## 2019-05-02 VITALS
HEIGHT: 63 IN | TEMPERATURE: 97.6 F | WEIGHT: 125 LBS | DIASTOLIC BLOOD PRESSURE: 73 MMHG | OXYGEN SATURATION: 100 % | BODY MASS INDEX: 22.15 KG/M2 | HEART RATE: 72 BPM | SYSTOLIC BLOOD PRESSURE: 134 MMHG | RESPIRATION RATE: 17 BRPM

## 2019-05-02 LAB
ALBUMIN SERPL-MCNC: 3 G/DL (ref 3.5–5)
ALBUMIN/GLOB SERPL: 0.9 {RATIO} (ref 1.1–2.2)
ALP SERPL-CCNC: 80 U/L (ref 45–117)
ALT SERPL-CCNC: 63 U/L (ref 12–78)
ANION GAP SERPL CALC-SCNC: 5 MMOL/L (ref 5–15)
AST SERPL-CCNC: 36 U/L (ref 15–37)
BILIRUB SERPL-MCNC: 0.4 MG/DL (ref 0.2–1)
BUN SERPL-MCNC: 15 MG/DL (ref 6–20)
BUN/CREAT SERPL: 12 (ref 12–20)
CALCIUM SERPL-MCNC: 8.4 MG/DL (ref 8.5–10.1)
CHLORIDE SERPL-SCNC: 112 MMOL/L (ref 97–108)
CO2 SERPL-SCNC: 24 MMOL/L (ref 21–32)
CREAT SERPL-MCNC: 1.23 MG/DL (ref 0.7–1.3)
GLOBULIN SER CALC-MCNC: 3.3 G/DL (ref 2–4)
GLUCOSE SERPL-MCNC: 90 MG/DL (ref 65–100)
POTASSIUM SERPL-SCNC: 4 MMOL/L (ref 3.5–5.1)
PROT SERPL-MCNC: 6.3 G/DL (ref 6.4–8.2)
SODIUM SERPL-SCNC: 141 MMOL/L (ref 136–145)

## 2019-05-02 PROCEDURE — 74011250637 HC RX REV CODE- 250/637: Performed by: INTERNAL MEDICINE

## 2019-05-02 PROCEDURE — 74011250636 HC RX REV CODE- 250/636: Performed by: INTERNAL MEDICINE

## 2019-05-02 PROCEDURE — 99218 HC RM OBSERVATION: CPT

## 2019-05-02 PROCEDURE — 36415 COLL VENOUS BLD VENIPUNCTURE: CPT

## 2019-05-02 PROCEDURE — 80053 COMPREHEN METABOLIC PANEL: CPT

## 2019-05-02 PROCEDURE — 96372 THER/PROPH/DIAG INJ SC/IM: CPT

## 2019-05-02 RX ADMIN — ATORVASTATIN CALCIUM 10 MG: 10 TABLET, FILM COATED ORAL at 10:10

## 2019-05-02 RX ADMIN — CROMOLYN SODIUM 1 DROP: 40 SOLUTION/ DROPS OPHTHALMIC at 13:00

## 2019-05-02 RX ADMIN — ENOXAPARIN SODIUM 40 MG: 40 INJECTION SUBCUTANEOUS at 05:06

## 2019-05-02 RX ADMIN — VITAMIN D, TAB 1000IU (100/BT) 5000 UNITS: 25 TAB at 10:10

## 2019-05-02 RX ADMIN — CYANOCOBALAMIN TAB 500 MCG 500 MCG: 500 TAB at 10:10

## 2019-05-02 RX ADMIN — PANTOPRAZOLE SODIUM 40 MG: 40 TABLET, DELAYED RELEASE ORAL at 10:10

## 2019-05-02 RX ADMIN — CROMOLYN SODIUM 1 DROP: 40 SOLUTION/ DROPS OPHTHALMIC at 10:10

## 2019-05-02 RX ADMIN — ATENOLOL 25 MG: 25 TABLET ORAL at 10:10

## 2019-05-02 RX ADMIN — Medication 10 ML: at 05:06

## 2019-05-02 NOTE — PROGRESS NOTES
Patient discharged on 5/2/19 at 4p with family at bedside. Signature box not working, family given printed discharge instructions. Blue phone used to explain discharge instructions. Family verbalized understanding of follow up appointment dates and times and discharge instructions. Peripheral IV removed and pulse ox removed. Volunteer services notified and will transport pt to entrance via wheelchair.

## 2019-05-02 NOTE — DISCHARGE SUMMARY
Discharge Summary       PATIENT ID: Cornell Montez  MRN: [id number]   YOB: 1950    DATE OF ADMISSION: 5/1/2019  2:49 AM    DATE OF DISCHARGE: 5/2/2019   PRIMARY CARE PROVIDER: Subha Reich MD     ATTENDING PHYSICIAN: Gudelia Hill MD  DISCHARGING PROVIDER: Gudelia Hill MD    To contact this individual call [phone] and ask the  to page. If unavailable ask to be transferred the Adult Hospitalist Department. CONSULTATIONS: None    PROCEDURES/SURGERIES: * No surgery found *    ADMITTING DIAGNOSES & HOSPITAL COURSE:   Admitted with AMS- work up didn't reveal any clear medical causes, family report food poisoning, had some ground fruit, was confused, next morning vomited then started feeling better. CT head -ve, blood work unremarkable. Risk of Re-Admission: low  DISCHARGE DIAGNOSES / PLAN:      #. Acute metabolic Encephalogy: POA- likely food poisoning, admitted for observation, work up -ve, CT head and blood tests all negative. Dc home with family. Confusion was transient and resolved same day after vomiting started to improve.  F/u with PCP     FOLLOW UP APPOINTMENTS:    Follow-up Information     Follow up With Specialties Details Why Contact Info    Subha Reich MD [facility] Practice In 1 week  [address] [phone]           ADDITIONAL CARE RECOMMENDATIONS:  Follow up with PCP     DIET: Resume previous diet    ACTIVITY: Activity as tolerated    DISCHARGE MEDICATIONS:  Current Discharge Medication List      CONTINUE these medications which have NOT CHANGED    Details   cromolyn (OPTICROM) 4 % ophthalmic solution INSTILL 1 DROP INTO EACH EYE 4 TIMES A DAY  Qty: 10 mL, Refills: 1    Associated Diagnoses: Viral URI      montelukast (SINGULAIR) 10 mg tablet TAKE 1 TABLET BY MOUTH EVERY DAY  Qty: 30 Tab, Refills: 0    Associated Diagnoses: Viral URI      atorvastatin (LIPITOR) 10 mg tablet TAKE 1 TABLET BY MOUTH EVERY DAY  Qty: 30 Tab, Refills: 5      cholecalciferol (VITAMIN D3) 5,000 unit capsule TAKE 1 CAPSULE BY MOUTH EVERY DAY  Qty: 100 Cap, Refills: 0      Omeprazole delayed release (PRILOSEC D/R) 20 mg tablet TAKE 1 TABLET BY MOUTH EVERY DAY  Qty: 30 Tab, Refills: 2    Associated Diagnoses: Gastroesophageal reflux disease without esophagitis      atenolol (TENORMIN) 25 mg tablet TAKE 1 TABLET BY MOUTH EVERY DAY  Qty: 30 Tab, Refills: 4      cyanocobalamin (VITAMIN B12) 500 mcg tablet TAKE 1 TAB BY MOUTH DAILY. Qty: 100 Tab, Refills: 0           NOTIFY YOUR PHYSICIAN FOR ANY OF THE FOLLOWING:   Fever over 101 degrees for 24 hours. Chest pain, shortness of breath, fever, chills, nausea, vomiting, diarrhea, change in mentation, falling, weakness, bleeding. Severe pain or pain not relieved by medications. Or, any other signs or symptoms that you may have questions about. DISPOSITION:    Home With:   OT  PT  HH  RN       Long term SNF/Inpatient Rehab   x Independent/assisted living    Hospice    Other:     PATIENT CONDITION AT DISCHARGE:     Functional status    Poor     Deconditioned     Independent      Cognition     Lucid     Forgetful     Dementia      Catheters/lines (plus indication)    Wells     PICC     PEG     None      Code status     Full code     DNR      PHYSICAL EXAMINATION AT DISCHARGE:  Patient seen and examined at bedside, Condition stable, explained discharge and follow up plans. General:  Alert, oriented, No acute distress, no [place]- son gave most hx. Resp:  No accessory muscle use, Good AE.   Neuro:  Grossly normal, no focal neuro deficits, follows commands   CHRONIC MEDICAL DIAGNOSES:  Problem List as of 5/2/2019 Date Reviewed: 8/7/2018          Codes Class Noted - Resolved    Essential hypertension ICD-10-CM: I10  ICD-9-CM: 401.9 Chronic 8/8/2013 - Present        Vitamin B12 deficiency (Chronic) ICD-10-CM: E53.8  ICD-9-CM: 266.2 Chronic 8/8/2013 - Present        Numbness and tingling of both legs (Chronic) ICD-10-CM: R20.0, R20.2  ICD-9-CM: 128. 0 Chronic 8/8/2013 - Present        Acute encephalopathy ICD-10-CM: G93.40  ICD-9-CM: 348.30  5/1/2019 - Present        Tongue mass ICD-10-CM: R22.0  ICD-9-CM: 784.2  9/11/2017 - Present        Hospital discharge follow-up ICD-10-CM: Z09  ICD-9-CM: V67.59  5/31/2017 - Present        Contusion of right chest wall ICD-10-CM: X91.495X  ICD-9-CM: 922.1  5/31/2017 - Present        Need for pneumococcal vaccination ICD-10-CM: Z23  ICD-9-CM: V03.82  5/10/2017 - Present        Routine physical examination ICD-10-CM: Z00.00  ICD-9-CM: V70.0  4/8/2015 - Present        Osteoarthritis of knee, unilateral ICD-10-CM: M17.10  ICD-9-CM: 715.16  4/8/2015 - Present        Tachycardia ICD-10-CM: R00.0  ICD-9-CM: 785.0  2/13/2015 - Present        Pulmonary nodule seen on imaging study ICD-10-CM: R91.1  ICD-9-CM: 793.11  12/10/2014 - Present        Colon cancer screening ICD-10-CM: Z12.11  ICD-9-CM: V76.51  12/10/2014 - Present        Vitamin D deficiency ICD-10-CM: E55.9  ICD-9-CM: 268.9  5/9/2014 - Present        Acute back pain ICD-10-CM: M54.9  ICD-9-CM: 724.5  5/5/2014 - Present        Lumbar sprain ICD-10-CM: S33. 5XXA  ICD-9-CM: 847.2  5/5/2014 - Present        Hyperlipidemia with target LDL less than 130 ICD-10-CM: E78.5  ICD-9-CM: 272.4  1/8/2014 - Present        Hypertriglyceridemia ICD-10-CM: E78.1  ICD-9-CM: 272.1  1/7/2014 - Present        Sensory ataxia ICD-10-CM: R27.8  ICD-9-CM: 781.3  11/7/2013 - Present        Late effect of stroke ICD-10-CM: I69.30  ICD-9-CM: 438.9  9/10/2013 - Present        Gait disturbance ICD-10-CM: R26.9  ICD-9-CM: 781.2  9/10/2013 - Present        RESOLVED: Alcohol intoxication ([facility] ICD-10-CM: X34.813  ICD-9-CM: 305.00  2/13/2015 - 4/5/2018              37 minutes were spent with the patient on counseling and coordination of care.     Signed:   Leo Stokes MD  5/2/2019  2:39 PM

## 2019-05-02 NOTE — PROGRESS NOTES
Hospital follow-up PCP transitional care appointment has been scheduled with MIRA Hong for Wednesday, 5/8/19 at 10:00 a.m. Pending patient discharge.   Farhan Wakefield, Care Management Specialist.

## 2019-05-02 NOTE — DISCHARGE INSTRUCTIONS
Discharge Instructions       PATIENT ID: Daniel Khan  MRN: [id number]   YOB: 1950    DATE OF ADMISSION: 5/1/2019  2:49 AM    DATE OF DISCHARGE: 5/2/2019    PRIMARY CARE PROVIDER: Dario Reyes MD     ATTENDING PHYSICIAN: Lucho Freire MD  DISCHARGING PROVIDER: Penny Hastings MD    To contact this individual call [phone] and ask the  to page. If unavailable ask to be transferred the Adult Hospitalist Department. DISCHARGE DIAGNOSES Transient altered mental status- likely food poisoning    CONSULTATIONS: None    PROCEDURES/SURGERIES: * No surgery found *    FOLLOW UP APPOINTMENTS:   Follow-up Information     Follow up With Specialties Details Why Contact Info    Dario Reyes MD [facility] Practice In 1 week  [address]  [phone]             ADDITIONAL CARE RECOMMENDATIONS: follow up with PCP     DIET: Resume previous diet    DISCHARGE MEDICATIONS:  None new    · It is important that you take the medication exactly as they are prescribed. · Keep your medication in the bottles provided by the pharmacist and keep a list of the medication names, dosages, and times to be taken in your wallet. · Do not take other medications without consulting your doctor. NOTIFY YOUR PHYSICIAN FOR ANY OF THE FOLLOWING:   Fever over 101 degrees for 24 hours. Chest pain, shortness of breath, fever, chills, nausea, vomiting, diarrhea, change in mentation, falling, weakness, bleeding. Severe pain or pain not relieved by medications. Or, any other signs or symptoms that you may have questions about. It was our pleasure to help take care of you and we hope you get well very soon.     DISPOSITION:    Home With:   OT  PT  HH  RN       SNF/Inpatient Rehab/LTAC   x Independent/assisted living    Hospice    Other:     CDMP Checked:   Yes x     PROBLEM LIST Updated:  Yes x     Signed:   Penny Hastings MD  5/2/2019  2:38 PM

## 2019-05-08 ENCOUNTER — OFFICE VISIT (OUTPATIENT)
Dept: FAMILY MEDICINE CLINIC | Age: 69
End: 2019-05-08

## 2019-05-08 VITALS
WEIGHT: 122.8 LBS | HEART RATE: 61 BPM | RESPIRATION RATE: 18 BRPM | OXYGEN SATURATION: 97 % | BODY MASS INDEX: 21.76 KG/M2 | HEIGHT: 63 IN | TEMPERATURE: 97.4 F | DIASTOLIC BLOOD PRESSURE: 82 MMHG | SYSTOLIC BLOOD PRESSURE: 132 MMHG

## 2019-05-08 RX ORDER — CHOLECALCIFEROL (VITAMIN D3) 125 MCG
CAPSULE ORAL
Qty: 100 CAP | Refills: 0 | Status: SHIPPED | OUTPATIENT
Start: 2019-05-08 | End: 2019-10-30 | Stop reason: SDUPTHER

## 2019-05-08 NOTE — PROGRESS NOTES
Chief Complaint   Patient presents with   [facility] Follow Up     [facility] - 5/01/19     1. Have you been to the ER, urgent care clinic since your last visit? Hospitalized since your last visit? Yes, patient went to [facility] due to encephalopathy 5/01/19. 2. Have you seen or consulted any other health care providers outside of the [address] [place] [address] [place] since your last visit? Include any pap smears or colon screening. No    Due to the language barrier, the nurse used the [employer]  service ([id number]) to explain to patient the waiver form he signed to have his son translate for him.

## 2019-05-08 NOTE — PROGRESS NOTES
HISTORY OF PRESENT ILLNESS  Angelica Dallas is a 81 y.o. male. HPI: Patient is accompanied by son who is translating from [place]. He has history of HTN, his cholesterol, he went to ER of on 5/1/2019 for altered mental status. At the time he denied ETOH intake, chest pain, SOB, headache, nausea and vomiting. Medical record indicated that patient is being worked up for sensory ataxia, MRI of head and lumbar spine was normal, had negative RPR, Normal homocystine methylmalonic acid levels. EMG and NCV showed sensory neuropathy in both lower extremities  Patient was admitted on  5/1/2019 and discharged on 5/2/2019. Blood work and test didn't reveal any clear medical causes. Family reported food poisoning. Had some fruit, was vomited next morning then started to feel better  Past Medical History:   Diagnosis Date    Acid indigestion     B12 deficiency 08/08/13    level 0    Hypercholesterolemia     Hypertension     Stroke [facility])      History reviewed. No pertinent surgical history. No Known Allergies    Current Outpatient Medications:     cromolyn (OPTICROM) 4 % ophthalmic solution, INSTILL 1 DROP INTO EACH EYE 4 TIMES A DAY, Disp: 10 mL, Rfl: 1    montelukast (SINGULAIR) 10 mg tablet, TAKE 1 TABLET BY MOUTH EVERY DAY, Disp: 30 Tab, Rfl: 0    atorvastatin (LIPITOR) 10 mg tablet, TAKE 1 TABLET BY MOUTH EVERY DAY, Disp: 30 Tab, Rfl: 5    cholecalciferol (VITAMIN D3) 5,000 unit capsule, TAKE 1 CAPSULE BY MOUTH EVERY DAY, Disp: 100 Cap, Rfl: 0    Omeprazole delayed release (PRILOSEC D/R) 20 mg tablet, TAKE 1 TABLET BY MOUTH EVERY DAY, Disp: 30 Tab, Rfl: 2    atenolol (TENORMIN) 25 mg tablet, TAKE 1 TABLET BY MOUTH EVERY DAY, Disp: 30 Tab, Rfl: 4    cyanocobalamin (VITAMIN B12) 500 mcg tablet, TAKE 1 TAB BY MOUTH DAILY. , Disp: 100 Tab, Rfl: 0  Review of Systems   Constitutional: Negative. Respiratory: Negative. Cardiovascular: Negative. Gastrointestinal: Negative.       Blood pressure 132/82, pulse 61, temperature 97.4 °F (36.3 °C), temperature source Oral, resp. rate 18, height 5' 3\" (1.6 m), weight 122 lb 12.8 oz (55.7 kg), SpO2 97 %. Physical Exam   Constitutional: No distress. HENT:   Mouth/Throat: Oropharynx is clear and moist.   Neck: Normal range of motion. Neck supple. Cardiovascular: Normal rate and regular rhythm. No murmur heard. Pulmonary/Chest: Effort normal and breath sounds normal.   Abdominal: Soft. Bowel sounds are normal.   Skin: Skin is warm and dry. Psychiatric: He has a normal mood and affect. His behavior is normal.   Nursing note and vitals reviewed. ASSESSMENT and PLAN  Diagnoses and all orders for this visit:    1. Transition of care performed with sharing of clinical summary    2.  Food poisoning, accidental or unintentional, subsequent encounter has resolved  Continue with current medication  Follow up with pcp  Pt was given an after visit summary which includes diagnosis, current medicines and vital and voiced understanding of treatment plan

## 2019-05-14 RX ORDER — ATENOLOL 25 MG/1
TABLET ORAL
Qty: 30 TAB | Refills: 3 | Status: SHIPPED | OUTPATIENT
Start: 2019-05-14 | End: 2019-09-09 | Stop reason: SDUPTHER

## 2019-05-22 ENCOUNTER — OFFICE VISIT (OUTPATIENT)
Dept: FAMILY MEDICINE CLINIC | Age: 69
End: 2019-05-22

## 2019-05-22 VITALS
OXYGEN SATURATION: 98 % | RESPIRATION RATE: 16 BRPM | HEART RATE: 61 BPM | TEMPERATURE: 97.8 F | WEIGHT: 124 LBS | BODY MASS INDEX: 21.97 KG/M2 | SYSTOLIC BLOOD PRESSURE: 128 MMHG | DIASTOLIC BLOOD PRESSURE: 80 MMHG | HEIGHT: 63 IN

## 2019-05-22 DIAGNOSIS — J06.9 VIRAL URI: ICD-10-CM

## 2019-05-22 DIAGNOSIS — E55.9 VITAMIN D DEFICIENCY: ICD-10-CM

## 2019-05-22 DIAGNOSIS — I10 ESSENTIAL HYPERTENSION: Primary | ICD-10-CM

## 2019-05-22 DIAGNOSIS — E53.8 VITAMIN B12 DEFICIENCY: ICD-10-CM

## 2019-05-22 DIAGNOSIS — K21.9 GASTROESOPHAGEAL REFLUX DISEASE WITHOUT ESOPHAGITIS: ICD-10-CM

## 2019-05-22 DIAGNOSIS — R27.8 SENSORY ATAXIA: ICD-10-CM

## 2019-05-22 DIAGNOSIS — E78.5 HYPERLIPIDEMIA WITH TARGET LDL LESS THAN 130: ICD-10-CM

## 2019-05-22 RX ORDER — MONTELUKAST SODIUM 10 MG/1
TABLET ORAL
Qty: 30 TAB | Refills: 1 | Status: SHIPPED | OUTPATIENT
Start: 2019-05-22 | End: 2019-06-15 | Stop reason: SDUPTHER

## 2019-05-22 NOTE — PROGRESS NOTES
HISTORY OF PRESENT ILLNESS  Angelica Dallas is a 81 y.o. male. seen for 4 months follow up on HTN,dyslipidemia, ataxia  He was hospitalized with toxic encephalopathy on 05/01/2019 , likely secondary to food poisoning. . All work up was negative including CT head. Doing well since. HPI  Cardiovascular Review  The patient has hypertension and hyperlipidemia. He reports taking medications as instructed, no medication side effects noted, no TIA's, no chest pain on exertion, no dyspnea on exertion, no swelling of ankles, no orthostatic dizziness or lightheadedness, no orthopnea or paroxysmal nocturnal dyspnea, no palpitations, no intermittent claudication symptoms, no muscle aches or pain. Diet and Lifestyle: not attempting to follow a low fat, low cholesterol diet, not attempting to follow a low sodium diet, sedentary, alcohol intake 3-4 beers/day. Lab review: labs reviewed and discussed with patient. Medicines: Atenolol 25 mg and Lipitor 10 mg     Lab Results   Component Value Date/Time    Cholesterol, total 116 01/22/2019 11:34 AM    HDL Cholesterol 27 (L) 01/22/2019 11:34 AM    LDL, calculated 48 01/22/2019 11:34 AM    VLDL, calculated 41 (H) 01/22/2019 11:34 AM    Triglyceride 206 (H) 01/22/2019 11:34 AM       Sensory Ataxia  diagnosed as length dependent neuropathy. He had MRI of head, Cervical spine and lumbar spine. 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 He was also referred for PT. He has negative RPR, normal homocystine and methylmalonic acid levels. He  had EMG and NCV which showed sensory neuropathy in both LE. Is on Vitamin b12 and Vitamin D supplement. Review of Systems   Constitutional: Negative for chills, fever and malaise/fatigue. HENT: Negative for congestion, ear pain, sore throat and tinnitus. Eyes: Negative for blurred vision, double vision, pain and discharge.    Respiratory: Negative for cough, shortness of breath and wheezing. Cardiovascular: Negative for chest pain, palpitations and leg swelling. Gastrointestinal: Negative for abdominal pain, blood in stool, constipation, diarrhea, nausea and vomiting. Genitourinary: Negative for dysuria, frequency, hematuria and urgency. Musculoskeletal: Negative for back pain, joint pain and myalgias. Skin: Negative for rash. Neurological: Negative for dizziness, tremors, seizures and headaches. Endo/Heme/Allergies: Negative for polydipsia. Does not bruise/bleed easily. Psychiatric/Behavioral: Negative for depression and substance abuse. The patient is not nervous/anxious. Physical Exam   Constitutional: He is oriented to person, place, and time. He appears well-developed and well-nourished. HENT:   Head: Normocephalic and atraumatic. Right Ear: External ear normal.   Nose: Nose normal.   Mouth/Throat: Oropharynx is clear and moist. No oropharyngeal exudate. Eyes: Pupils are equal, round, and reactive to light. Conjunctivae and EOM are normal. No scleral icterus. Neck: Normal range of motion. Neck supple. No JVD present. No thyromegaly present. Cardiovascular: Normal rate, regular rhythm, normal heart sounds and intact distal pulses. Exam reveals no gallop and no friction rub. No murmur heard. Pulmonary/Chest: Effort normal and breath sounds normal. He has no wheezes. Abdominal: Soft. Bowel sounds are normal. He exhibits no distension and no mass. Musculoskeletal: Normal range of motion. He exhibits no edema or tenderness. Lymphadenopathy:     He has no cervical adenopathy. Neurological: He is alert and oriented to person, place, and time. He has normal reflexes. No cranial nerve deficit. Coordination normal.   Reflex Scores:       Tricep reflexes are 2+ on the right side and 2+ on the left side. Bicep reflexes are 2+ on the right side and 2+ on the left side.        Brachioradialis reflexes are 2+ on the right side and 2+ on the left side. Decreased sensations on both lower extremities. Exaggerated knee reflex on left side. He is using cane to support when he walks. Gait has been significantly improved consistently. Skin: Skin is warm and dry. No rash noted. He is not diaphoretic. Psychiatric: He has a normal mood and affect. Nursing note and vitals reviewed. ASSESSMENT and PLAN  Diagnoses and all orders for this visit:    1. Essential hypertension  -     METABOLIC PANEL, COMPREHENSIVE    2. Gastroesophageal reflux disease without esophagitis    3. Hyperlipidemia with target LDL less than 275  -     METABOLIC PANEL, COMPREHENSIVE  -     LIPID PANEL    4. Vitamin B12 deficiency  -     VITAMIN B12    5. Vitamin D deficiency  -     VITAMIN D, 25 HYDROXY    6. Sensory ataxia  -     VITAMIN B12  -     VITAMIN D, 25 HYDROXY      Discussed lifestyle issues and health guidance given  Patient was given an after visit summary which includes diagnoses, vital signs, current medications, instructions and references & authorized prescriptions . Results of labs will be conveyed to patient, once available. Pt verbalized instructions I provided and expressed understanding of discussion that was held today. Follow-up and Dispositions    · Return in about 4 months (around 9/22/2019) for fasting, follow up.

## 2019-05-22 NOTE — PATIENT INSTRUCTIONS
DASH Diet: Care Instructions  Your Care Instructions    The DASH diet is an eating plan that can help lower your blood pressure. DASH stands for Dietary Approaches to Stop Hypertension. Hypertension is high blood pressure. The DASH diet focuses on eating foods that are high in calcium, potassium, and magnesium. These nutrients can lower blood pressure. The foods that are highest in these nutrients are fruits, vegetables, low-fat dairy products, nuts, seeds, and legumes. But taking calcium, potassium, and magnesium supplements instead of eating foods that are high in those nutrients does not have the same effect. The DASH diet also includes whole grains, fish, and poultry. The DASH diet is one of several lifestyle changes your doctor may recommend to lower your high blood pressure. Your doctor may also want you to decrease the amount of sodium in your diet. Lowering sodium while following the DASH diet can lower blood pressure even further than just the DASH diet alone. Follow-up care is a key part of your treatment and safety. Be sure to make and go to all appointments, and call your doctor if you are having problems. It's also a good idea to know your test results and keep a list of the medicines you take. How can you care for yourself at home? Following the DASH diet  · Eat 4 to 5 servings of fruit each day. A serving is 1 medium-sized piece of fruit, ½ cup chopped or canned fruit, 1/4 cup dried fruit, or 4 ounces (½ cup) of fruit juice. Choose fruit more often than fruit juice. · Eat 4 to 5 servings of vegetables each day. A serving is 1 cup of lettuce or raw leafy vegetables, ½ cup of chopped or cooked vegetables, or 4 ounces (½ cup) of vegetable juice. Choose vegetables more often than vegetable juice. · Get 2 to 3 servings of low-fat and fat-free dairy each day. A serving is 8 ounces of milk, 1 cup of yogurt, or 1 ½ ounces of cheese. · Eat 6 to 8 servings of grains each day.  A serving is 1 slice of bread, 1 ounce of dry cereal, or ½ cup of cooked rice, pasta, or cooked cereal. Try to choose whole-grain products as much as possible. · Limit lean meat, poultry, and fish to 2 servings each day. A serving is 3 ounces, about the size of a deck of cards. · Eat 4 to 5 servings of nuts, seeds, and legumes (cooked dried beans, lentils, and split peas) each week. A serving is 1/3 cup of nuts, 2 tablespoons of seeds, or ½ cup of cooked beans or peas. · Limit fats and oils to 2 to 3 servings each day. A serving is 1 teaspoon of vegetable oil or 2 tablespoons of salad dressing. · Limit sweets and added sugars to 5 servings or less a week. A serving is 1 tablespoon jelly or jam, ½ cup sorbet, or 1 cup of lemonade. · Eat less than 2,300 milligrams (mg) of sodium a day. If you limit your sodium to 1,500 mg a day, you can lower your blood pressure even more. Tips for success  · Start small. Do not try to make dramatic changes to your diet all at once. You might feel that you are missing out on your favorite foods and then be more likely to not follow the plan. Make small changes, and stick with them. Once those changes become habit, add a few more changes. · Try some of the following:  ? Make it a goal to eat a fruit or vegetable at every meal and at snacks. This will make it easy to get the recommended amount of fruits and vegetables each day. ? Try yogurt topped with fruit and nuts for a snack or healthy dessert. ? Add lettuce, tomato, cucumber, and onion to sandwiches. ? Combine a ready-made pizza crust with low-fat mozzarella cheese and lots of vegetable toppings. Try using tomatoes, squash, spinach, broccoli, carrots, cauliflower, and onions. ? Have a variety of cut-up vegetables with a low-fat dip as an appetizer instead of chips and dip. ? Sprinkle sunflower seeds or chopped almonds over salads. Or try adding chopped walnuts or almonds to cooked vegetables.   ? Try some vegetarian meals using beans and peas. Add garbanzo or kidney beans to salads. Make burritos and tacos with mashed knapp beans or black beans. Where can you learn more? Go to http://bri-riley.info/. Enter R916 in the search box to learn more about \"DASH Diet: Care Instructions. \"  Current as of: July 22, 2018  Content Version: 11.9  © 5869-7321 [employer]. Care instructions adapted under license by [employer] (which disclaims liability or warranty for this information). If you have questions about a medical condition or this instruction, always ask your healthcare professional. [address] any warranty or liability for your use of this information.

## 2019-05-23 LAB
25(OH)D3+25(OH)D2 SERPL-MCNC: 77.2 NG/ML (ref 30–100)
ALBUMIN SERPL-MCNC: 4.6 G/DL (ref 3.6–4.8)
ALBUMIN/GLOB SERPL: 1.5 {RATIO} (ref 1.2–2.2)
ALP SERPL-CCNC: 93 IU/L (ref 39–117)
ALT SERPL-CCNC: 33 IU/L (ref 0–44)
AST SERPL-CCNC: 24 IU/L (ref 0–40)
BILIRUB SERPL-MCNC: 0.6 MG/DL (ref 0–1.2)
BUN SERPL-MCNC: 9 MG/DL (ref 8–27)
BUN/CREAT SERPL: 9 (ref 10–24)
CALCIUM SERPL-MCNC: 9.8 MG/DL (ref 8.6–10.2)
CHLORIDE SERPL-SCNC: 103 MMOL/L (ref 96–106)
CHOLEST SERPL-MCNC: 124 MG/DL (ref 100–199)
CO2 SERPL-SCNC: 27 MMOL/L (ref 20–29)
CREAT SERPL-MCNC: 1.02 MG/DL (ref 0.76–1.27)
GLOBULIN SER CALC-MCNC: 3.1 G/DL (ref 1.5–4.5)
GLUCOSE SERPL-MCNC: 98 MG/DL (ref 65–99)
HDLC SERPL-MCNC: 33 MG/DL
INTERPRETATION, 910389: NORMAL
LDLC SERPL CALC-MCNC: 53 MG/DL (ref 0–99)
POTASSIUM SERPL-SCNC: 4.6 MMOL/L (ref 3.5–5.2)
PROT SERPL-MCNC: 7.7 G/DL (ref 6–8.5)
SODIUM SERPL-SCNC: 141 MMOL/L (ref 134–144)
TRIGL SERPL-MCNC: 191 MG/DL (ref 0–149)
VIT B12 SERPL-MCNC: 504 PG/ML (ref 232–1245)
VLDLC SERPL CALC-MCNC: 38 MG/DL (ref 5–40)

## 2019-05-23 NOTE — PROGRESS NOTES
Call placed to [facility] who is on [address]. No answer. Patients name and  verified. reviewed recent lab results and recommendation with him. He verbalized understanding.

## 2019-05-23 NOTE — PROGRESS NOTES
Please inform person on PHI,  Kidney,liver, cholesterol,vitamin B12 and vitamin D all results are normal and reassuring except high Triglyceride.  To watch diet for fried food  Follow up in 4 months  thanks

## 2019-06-08 DIAGNOSIS — J06.9 VIRAL URI: ICD-10-CM

## 2019-06-09 RX ORDER — CROMOLYN SODIUM 40 MG/ML
SOLUTION/ DROPS OPHTHALMIC
Qty: 10 ML | Refills: 1 | Status: SHIPPED | OUTPATIENT
Start: 2019-06-09 | End: 2019-07-25 | Stop reason: SDUPTHER

## 2019-06-15 DIAGNOSIS — J06.9 VIRAL URI: ICD-10-CM

## 2019-06-15 RX ORDER — MONTELUKAST SODIUM 10 MG/1
TABLET ORAL
Qty: 30 TAB | Refills: 0 | Status: SHIPPED | OUTPATIENT
Start: 2019-06-15 | End: 2019-08-09 | Stop reason: SDUPTHER

## 2019-07-03 DIAGNOSIS — K21.9 GASTROESOPHAGEAL REFLUX DISEASE WITHOUT ESOPHAGITIS: ICD-10-CM

## 2019-07-03 RX ORDER — PHENOL/SODIUM PHENOLATE
AEROSOL, SPRAY (ML) MUCOUS MEMBRANE
Qty: 28 TAB | Refills: 2 | Status: SHIPPED | OUTPATIENT
Start: 2019-07-03 | End: 2019-10-31 | Stop reason: SDUPTHER

## 2019-07-12 ENCOUNTER — DOCUMENTATION ONLY (OUTPATIENT)
Dept: FAMILY MEDICINE CLINIC | Age: 69
End: 2019-07-12

## 2019-07-12 NOTE — PROGRESS NOTES
Received fax from [address] [place] [address] for incontinence supplies. Form completed by provider and faxed to [phone]. Received fax confirmation.

## 2019-07-25 DIAGNOSIS — J06.9 VIRAL URI: ICD-10-CM

## 2019-07-25 RX ORDER — CROMOLYN SODIUM 40 MG/ML
SOLUTION/ DROPS OPHTHALMIC
Qty: 10 ML | Refills: 1 | Status: SHIPPED | OUTPATIENT
Start: 2019-07-25 | End: 2019-08-08 | Stop reason: SDUPTHER

## 2019-08-08 DIAGNOSIS — J06.9 VIRAL URI: ICD-10-CM

## 2019-08-08 RX ORDER — CROMOLYN SODIUM 40 MG/ML
SOLUTION/ DROPS OPHTHALMIC
Qty: 30 ML | Refills: 0 | Status: SHIPPED | OUTPATIENT
Start: 2019-08-08 | End: 2020-01-06

## 2019-08-08 NOTE — TELEPHONE ENCOUNTER
Pharmacy requesting medication refill for 90 day supply.      Requested Prescriptions     Pending Prescriptions Disp Refills    cromolyn (OPTICROM) 4 % ophthalmic solution 10 mL 1     Sig: Use in affected eye(s)     Medication was submitted to pharmacy on 7/25/2019 for 10mL and 1 refill     Pharmacy on file verified  [phone])

## 2019-08-09 DIAGNOSIS — J06.9 VIRAL URI: ICD-10-CM

## 2019-08-09 RX ORDER — MONTELUKAST SODIUM 10 MG/1
TABLET ORAL
Qty: 30 TAB | Refills: 1 | Status: SHIPPED | OUTPATIENT
Start: 2019-08-09 | End: 2019-09-05 | Stop reason: SDUPTHER

## 2019-09-05 DIAGNOSIS — J06.9 VIRAL URI: ICD-10-CM

## 2019-09-05 RX ORDER — MONTELUKAST SODIUM 10 MG/1
TABLET ORAL
Qty: 30 TAB | Refills: 1 | Status: SHIPPED | OUTPATIENT
Start: 2019-09-05 | End: 2019-09-28 | Stop reason: SDUPTHER

## 2019-09-09 RX ORDER — ATENOLOL 25 MG/1
TABLET ORAL
Qty: 30 TAB | Refills: 3 | Status: SHIPPED | OUTPATIENT
Start: 2019-09-09 | End: 2020-02-29 | Stop reason: SDUPTHER

## 2019-09-25 PROBLEM — Z23 NEED FOR PNEUMOCOCCAL VACCINATION: Status: RESOLVED | Noted: 2017-05-10 | Resolved: 2019-09-25

## 2019-09-28 DIAGNOSIS — J06.9 VIRAL URI: ICD-10-CM

## 2019-09-28 RX ORDER — MONTELUKAST SODIUM 10 MG/1
TABLET ORAL
Qty: 30 TAB | Refills: 1 | Status: SHIPPED | OUTPATIENT
Start: 2019-09-28 | End: 2019-11-15 | Stop reason: SDUPTHER

## 2019-10-01 ENCOUNTER — OFFICE VISIT (OUTPATIENT)
Dept: FAMILY MEDICINE CLINIC | Age: 69
End: 2019-10-01

## 2019-10-01 VITALS
WEIGHT: 125 LBS | BODY MASS INDEX: 22.15 KG/M2 | HEIGHT: 63 IN | HEART RATE: 80 BPM | TEMPERATURE: 97.8 F | RESPIRATION RATE: 18 BRPM | SYSTOLIC BLOOD PRESSURE: 119 MMHG | DIASTOLIC BLOOD PRESSURE: 85 MMHG | OXYGEN SATURATION: 100 %

## 2019-10-01 DIAGNOSIS — E78.1 HYPERTRIGLYCERIDEMIA: ICD-10-CM

## 2019-10-01 DIAGNOSIS — E53.8 VITAMIN B12 DEFICIENCY: ICD-10-CM

## 2019-10-01 DIAGNOSIS — E78.5 HYPERLIPIDEMIA WITH TARGET LDL LESS THAN 130: ICD-10-CM

## 2019-10-01 DIAGNOSIS — R27.8 SENSORY ATAXIA: ICD-10-CM

## 2019-10-01 DIAGNOSIS — E55.9 VITAMIN D DEFICIENCY: ICD-10-CM

## 2019-10-01 DIAGNOSIS — Z23 ENCOUNTER FOR IMMUNIZATION: ICD-10-CM

## 2019-10-01 DIAGNOSIS — I10 ESSENTIAL HYPERTENSION: Primary | ICD-10-CM

## 2019-10-01 NOTE — PROGRESS NOTES
HISTORY OF PRESENT ILLNESS  Angelica Kaminski is a 81 y.o. male. seen today for 4 months follow up on HTN,dyslipidemia. Sensory ataxia. HPI  Cardiovascular Review  The patient has hypertension and hyperlipidemia.  He reports taking medications as instructed, no medication side effects noted, no TIA's, no chest pain on exertion, no dyspnea on exertion, no swelling of ankles, no orthostatic dizziness or lightheadedness, no orthopnea or paroxysmal nocturnal dyspnea, no palpitations, no intermittent claudication symptoms, no muscle aches or pain.  Diet and Lifestyle: not attempting to follow a low fat, low cholesterol diet, not attempting to follow a low sodium diet, sedentary, alcohol intake 3-4 beers/day.  Lab review: labs reviewed and discussed with patient.    Medicines: Atenolol 25 mg and Lipitor 10 mg     Lab Results   Component Value Date/Time    Cholesterol, total 124 05/22/2019 11:42 AM    HDL Cholesterol 33 (L) 05/22/2019 11:42 AM    LDL, calculated 53 05/22/2019 11:42 AM    VLDL, calculated 38 05/22/2019 11:42 AM    Triglyceride 191 (H) 05/22/2019 11:42 AM       Sensory Ataxia  diagnosed as length dependent neuropathy. He had MRI of head, Cervical spine and lumbar spine. 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 He was also referred for PT. He has negative RPR, normal homocystine and methylmalonic acid levels. He  had EMG and NCV which showed sensory neuropathy in both LE. Is on Vitamin b12 and Vitamin D supplement. Uses cane when goes out side. In home, walks without support. Review of Systems   Constitutional: Negative for chills, fever and malaise/fatigue. HENT: Negative for congestion, ear pain, sore throat and tinnitus. Eyes: Negative for blurred vision, double vision, pain and discharge. Respiratory: Negative for cough, shortness of breath and wheezing.     Cardiovascular: Negative for chest pain, palpitations and leg swelling. Gastrointestinal: Negative for abdominal pain, blood in stool, constipation, diarrhea, nausea and vomiting. Genitourinary: Negative for dysuria, frequency, hematuria and urgency. Musculoskeletal: Negative for back pain, joint pain and myalgias. Skin: Negative for rash. Neurological: Negative for dizziness, tremors, seizures and headaches. Endo/Heme/Allergies: Negative for polydipsia. Does not bruise/bleed easily. Psychiatric/Behavioral: Negative for depression and substance abuse. The patient is not nervous/anxious. Physical Exam   Constitutional: He is oriented to person, place, and time. He appears well-developed and well-nourished. HENT:   Head: Normocephalic and atraumatic. Right Ear: External ear normal.   Nose: Nose normal.   Mouth/Throat: Oropharynx is clear and moist. No oropharyngeal exudate. Eyes: Pupils are equal, round, and reactive to light. Conjunctivae and EOM are normal. No scleral icterus. Neck: Normal range of motion. Neck supple. No JVD present. No thyromegaly present. Cardiovascular: Normal rate, regular rhythm, normal heart sounds and intact distal pulses. Exam reveals no gallop and no friction rub. No murmur heard. Pulmonary/Chest: Effort normal and breath sounds normal. He has no wheezes. Abdominal: Soft. Bowel sounds are normal. He exhibits no distension and no mass. Musculoskeletal: Normal range of motion. He exhibits no edema or tenderness. Lymphadenopathy:     He has no cervical adenopathy. Neurological: He is alert and oriented to person, place, and time. He has normal reflexes. No cranial nerve deficit. Coordination normal.   Reflex Scores:       Tricep reflexes are 2+ on the right side and 2+ on the left side. Bicep reflexes are 2+ on the right side and 2+ on the left side. Brachioradialis reflexes are 2+ on the right side and 2+ on the left side. Decreased sensations on both lower extremities.   Exaggerated knee reflex on left side. He is using cane to support when he walks. Gait has been significantly improved consistently. Skin: Skin is warm and dry. No rash noted. He is not diaphoretic. Psychiatric: He has a normal mood and affect. Nursing note and vitals reviewed. ASSESSMENT and PLAN  Diagnoses and all orders for this visit:    1. Essential hypertension  -     METABOLIC PANEL, COMPREHENSIVE    2. Hyperlipidemia with target LDL less than 130  -     LIPID PANEL  -     METABOLIC PANEL, COMPREHENSIVE    3. Encounter for immunization  -     HI IMMUNIZ ADMIN,1 SINGLE/COMB VAC/TOXOID  -     INFLUENZA VACCINE INACTIVATED (IIV), SUBUNIT, ADJUVANTED, IM    4. Sensory ataxia    5. Hypertriglyceridemia  -     LIPID PANEL    6. Vitamin D deficiency  -     VITAMIN D, 25 HYDROXY    7. Vitamin B12 deficiency  -     VITAMIN B12    discussed fall prevention and also alcohol limitation. Discussed lifestyle issues and health guidance given  Patient was given an after visit summary which includes diagnoses, vital signs, current medications, instructions and references & authorized prescriptions . Results of labs will be conveyed to patient, once available. Pt verbalized instructions I provided and expressed understanding of discussion that was held today. Follow-up and Dispositions    · Return in about 6 months (around 4/1/2020).

## 2019-10-01 NOTE — PATIENT INSTRUCTIONS
DASH Diet: Care Instructions  Your Care Instructions    The DASH diet is an eating plan that can help lower your blood pressure. DASH stands for Dietary Approaches to Stop Hypertension. Hypertension is high blood pressure. The DASH diet focuses on eating foods that are high in calcium, potassium, and magnesium. These nutrients can lower blood pressure. The foods that are highest in these nutrients are fruits, vegetables, low-fat dairy products, nuts, seeds, and legumes. But taking calcium, potassium, and magnesium supplements instead of eating foods that are high in those nutrients does not have the same effect. The DASH diet also includes whole grains, fish, and poultry. The DASH diet is one of several lifestyle changes your doctor may recommend to lower your high blood pressure. Your doctor may also want you to decrease the amount of sodium in your diet. Lowering sodium while following the DASH diet can lower blood pressure even further than just the DASH diet alone. Follow-up care is a key part of your treatment and safety. Be sure to make and go to all appointments, and call your doctor if you are having problems. It's also a good idea to know your test results and keep a list of the medicines you take. How can you care for yourself at home? Following the DASH diet  · Eat 4 to 5 servings of fruit each day. A serving is 1 medium-sized piece of fruit, ½ cup chopped or canned fruit, 1/4 cup dried fruit, or 4 ounces (½ cup) of fruit juice. Choose fruit more often than fruit juice. · Eat 4 to 5 servings of vegetables each day. A serving is 1 cup of lettuce or raw leafy vegetables, ½ cup of chopped or cooked vegetables, or 4 ounces (½ cup) of vegetable juice. Choose vegetables more often than vegetable juice. · Get 2 to 3 servings of low-fat and fat-free dairy each day. A serving is 8 ounces of milk, 1 cup of yogurt, or 1 ½ ounces of cheese. · Eat 6 to 8 servings of grains each day.  A serving is 1 slice of bread, 1 ounce of dry cereal, or ½ cup of cooked rice, pasta, or cooked cereal. Try to choose whole-grain products as much as possible. · Limit lean meat, poultry, and fish to 2 servings each day. A serving is 3 ounces, about the size of a deck of cards. · Eat 4 to 5 servings of nuts, seeds, and legumes (cooked dried beans, lentils, and split peas) each week. A serving is 1/3 cup of nuts, 2 tablespoons of seeds, or ½ cup of cooked beans or peas. · Limit fats and oils to 2 to 3 servings each day. A serving is 1 teaspoon of vegetable oil or 2 tablespoons of salad dressing. · Limit sweets and added sugars to 5 servings or less a week. A serving is 1 tablespoon jelly or jam, ½ cup sorbet, or 1 cup of lemonade. · Eat less than 2,300 milligrams (mg) of sodium a day. If you limit your sodium to 1,500 mg a day, you can lower your blood pressure even more. Tips for success  · Start small. Do not try to make dramatic changes to your diet all at once. You might feel that you are missing out on your favorite foods and then be more likely to not follow the plan. Make small changes, and stick with them. Once those changes become habit, add a few more changes. · Try some of the following:  ? Make it a goal to eat a fruit or vegetable at every meal and at snacks. This will make it easy to get the recommended amount of fruits and vegetables each day. ? Try yogurt topped with fruit and nuts for a snack or healthy dessert. ? Add lettuce, tomato, cucumber, and onion to sandwiches. ? Combine a ready-made pizza crust with low-fat mozzarella cheese and lots of vegetable toppings. Try using tomatoes, squash, spinach, broccoli, carrots, cauliflower, and onions. ? Have a variety of cut-up vegetables with a low-fat dip as an appetizer instead of chips and dip. ? Sprinkle sunflower seeds or chopped almonds over salads. Or try adding chopped walnuts or almonds to cooked vegetables.   ? Try some vegetarian meals using beans and peas. Add garbanzo or kidney beans to salads. Make burritos and tacos with mashed knapp beans or black beans. Where can you learn more? Go to http://bri-riley.info/. Enter D619 in the search box to learn more about \"DASH Diet: Care Instructions. \"  Current as of: April 9, 2019  Content Version: 12.2  © 5874-1332 [employer]. Care instructions adapted under license by [employer] (which disclaims liability or warranty for this information). If you have questions about a medical condition or this instruction, always ask your healthcare professional. [address] any warranty or liability for your use of this information.

## 2019-10-02 LAB
25(OH)D3+25(OH)D2 SERPL-MCNC: 59.1 NG/ML (ref 30–100)
ALBUMIN SERPL-MCNC: 4.9 G/DL (ref 3.6–4.8)
ALBUMIN/GLOB SERPL: 1.6 {RATIO} (ref 1.2–2.2)
ALP SERPL-CCNC: 94 IU/L (ref 39–117)
ALT SERPL-CCNC: 28 IU/L (ref 0–44)
AST SERPL-CCNC: 18 IU/L (ref 0–40)
BILIRUB SERPL-MCNC: 0.4 MG/DL (ref 0–1.2)
BUN SERPL-MCNC: 14 MG/DL (ref 8–27)
BUN/CREAT SERPL: 12 (ref 10–24)
CALCIUM SERPL-MCNC: 9.8 MG/DL (ref 8.6–10.2)
CHLORIDE SERPL-SCNC: 103 MMOL/L (ref 96–106)
CHOLEST SERPL-MCNC: 245 MG/DL (ref 100–199)
CO2 SERPL-SCNC: 25 MMOL/L (ref 20–29)
CREAT SERPL-MCNC: 1.14 MG/DL (ref 0.76–1.27)
GLOBULIN SER CALC-MCNC: 3 G/DL (ref 1.5–4.5)
GLUCOSE SERPL-MCNC: 99 MG/DL (ref 65–99)
HDLC SERPL-MCNC: 34 MG/DL
INTERPRETATION, 910389: NORMAL
LDLC SERPL CALC-MCNC: 134 MG/DL (ref 0–99)
POTASSIUM SERPL-SCNC: 4.5 MMOL/L (ref 3.5–5.2)
PROT SERPL-MCNC: 7.9 G/DL (ref 6–8.5)
SODIUM SERPL-SCNC: 142 MMOL/L (ref 134–144)
TRIGL SERPL-MCNC: 386 MG/DL (ref 0–149)
VIT B12 SERPL-MCNC: 440 PG/ML (ref 232–1245)
VLDLC SERPL CALC-MCNC: 77 MG/DL (ref 5–40)

## 2019-10-02 NOTE — PROGRESS NOTES
Please inform son,  Cholesterol results are again very high, mainly Triglycerides and total cholesterol. Need to watch diet strictly and limit alcohol. Not sure if he is taking his lipitor. if yes, will need to adjust dose.  Please check with son  Rose Dec, vitamin D and B12 levels are normal  thanks

## 2019-10-03 DIAGNOSIS — E78.5 HYPERLIPIDEMIA WITH TARGET LDL LESS THAN 130: Primary | ICD-10-CM

## 2019-10-03 RX ORDER — ATORVASTATIN CALCIUM 20 MG/1
20 TABLET, FILM COATED ORAL DAILY
Qty: 30 TAB | Refills: 5 | Status: SHIPPED | OUTPATIENT
Start: 2019-10-03 | End: 2020-05-16

## 2019-10-03 NOTE — PROGRESS NOTES
Call placed to patients son Yohan Naylor who is on [address] [place] [address]. Patients name and  verified. Reviewed recent test results and recommendations. He was appreciative of call and verbalized understanding. He stated that the patient does take his Lipitor. Advised I would notify the provider.

## 2019-10-23 DIAGNOSIS — J06.9 VIRAL URI: ICD-10-CM

## 2019-10-23 RX ORDER — MONTELUKAST SODIUM 10 MG/1
TABLET ORAL
Qty: 30 TAB | Refills: 1 | Status: SHIPPED | OUTPATIENT
Start: 2019-10-23 | End: 2019-11-15 | Stop reason: SDUPTHER

## 2019-10-30 RX ORDER — CHOLECALCIFEROL (VITAMIN D3) 125 MCG
CAPSULE ORAL
Qty: 30 CAP | Refills: 3 | Status: SHIPPED | OUTPATIENT
Start: 2019-10-30 | End: 2020-05-05

## 2019-10-31 DIAGNOSIS — K21.9 GASTROESOPHAGEAL REFLUX DISEASE WITHOUT ESOPHAGITIS: ICD-10-CM

## 2019-10-31 RX ORDER — PHENOL/SODIUM PHENOLATE
AEROSOL, SPRAY (ML) MUCOUS MEMBRANE
Qty: 28 TAB | Refills: 2 | Status: SHIPPED | OUTPATIENT
Start: 2019-10-31 | End: 2020-02-01 | Stop reason: SDUPTHER

## 2019-11-15 DIAGNOSIS — J06.9 VIRAL URI: ICD-10-CM

## 2019-11-15 RX ORDER — MONTELUKAST SODIUM 10 MG/1
TABLET ORAL
Qty: 30 TAB | Refills: 1 | Status: SHIPPED | OUTPATIENT
Start: 2019-11-15 | End: 2019-12-26

## 2019-12-24 DIAGNOSIS — J06.9 VIRAL URI: ICD-10-CM

## 2019-12-26 RX ORDER — MONTELUKAST SODIUM 10 MG/1
TABLET ORAL
Qty: 30 TAB | Refills: 1 | Status: SHIPPED | OUTPATIENT
Start: 2019-12-26 | End: 2020-01-24 | Stop reason: SDUPTHER

## 2020-01-05 DIAGNOSIS — J06.9 VIRAL URI: ICD-10-CM

## 2020-01-06 RX ORDER — CROMOLYN SODIUM 40 MG/ML
SOLUTION/ DROPS OPHTHALMIC
Qty: 10 ML | Refills: 1 | Status: SHIPPED | OUTPATIENT
Start: 2020-01-06 | End: 2020-03-18 | Stop reason: SDUPTHER

## 2020-01-24 DIAGNOSIS — J06.9 VIRAL URI: ICD-10-CM

## 2020-01-24 RX ORDER — MONTELUKAST SODIUM 10 MG/1
TABLET ORAL
Qty: 30 TAB | Refills: 1 | Status: SHIPPED | OUTPATIENT
Start: 2020-01-24 | End: 2020-02-25 | Stop reason: SDUPTHER

## 2020-01-24 RX ORDER — MONTELUKAST SODIUM 10 MG/1
TABLET ORAL
Qty: 30 TAB | Refills: 1 | OUTPATIENT
Start: 2020-01-24

## 2020-02-01 DIAGNOSIS — K21.9 GASTROESOPHAGEAL REFLUX DISEASE WITHOUT ESOPHAGITIS: ICD-10-CM

## 2020-02-01 NOTE — TELEPHONE ENCOUNTER
.Pharmacy is requesting a refill for 90 day supply for the medication   . Requested Prescriptions     Pending Prescriptions Disp Refills    Omeprazole delayed release (PRILOSEC D/R) 20 mg tablet 28 Tab 2     . LOV: October 01, 2019 10:40 AM  Last refill :10/31/2019  Pharmacy verified

## 2020-02-03 RX ORDER — PHENOL/SODIUM PHENOLATE
AEROSOL, SPRAY (ML) MUCOUS MEMBRANE
Qty: 90 TAB | Refills: 0 | Status: SHIPPED | OUTPATIENT
Start: 2020-02-03 | End: 2020-06-11 | Stop reason: SDUPTHER

## 2020-02-04 ENCOUNTER — OFFICE VISIT (OUTPATIENT)
Dept: FAMILY MEDICINE CLINIC | Age: 70
End: 2020-02-04

## 2020-02-04 VITALS
RESPIRATION RATE: 16 BRPM | TEMPERATURE: 98.2 F | BODY MASS INDEX: 22.54 KG/M2 | HEIGHT: 63 IN | HEART RATE: 73 BPM | WEIGHT: 127.2 LBS | OXYGEN SATURATION: 98 % | DIASTOLIC BLOOD PRESSURE: 78 MMHG | SYSTOLIC BLOOD PRESSURE: 112 MMHG

## 2020-02-04 DIAGNOSIS — E53.8 VITAMIN B12 DEFICIENCY: ICD-10-CM

## 2020-02-04 DIAGNOSIS — I10 ESSENTIAL HYPERTENSION: Primary | ICD-10-CM

## 2020-02-04 DIAGNOSIS — Z71.89 ADVANCED CARE PLANNING/COUNSELING DISCUSSION: ICD-10-CM

## 2020-02-04 DIAGNOSIS — Z12.11 SCREENING FOR COLON CANCER: ICD-10-CM

## 2020-02-04 DIAGNOSIS — E78.5 HYPERLIPIDEMIA WITH TARGET LDL LESS THAN 130: ICD-10-CM

## 2020-02-04 DIAGNOSIS — E55.9 VITAMIN D DEFICIENCY: ICD-10-CM

## 2020-02-04 DIAGNOSIS — E78.1 HYPERTRIGLYCERIDEMIA: ICD-10-CM

## 2020-02-04 DIAGNOSIS — I69.30 LATE EFFECT OF STROKE: ICD-10-CM

## 2020-02-04 RX ORDER — AMITRIPTYLINE HYDROCHLORIDE 25 MG/1
25 TABLET, FILM COATED ORAL
Qty: 30 TAB | Refills: 3 | Status: SHIPPED | OUTPATIENT
Start: 2020-02-04 | End: 2020-02-29 | Stop reason: SDUPTHER

## 2020-02-04 NOTE — PATIENT INSTRUCTIONS

## 2020-02-04 NOTE — PROGRESS NOTES
HISTORY OF PRESENT ILLNESS  Angelica Zhu is a 78 y.o. male. seen for 4 months follow up on HTN,dyslipidemia, sensory ataxia. Now today new concern is numbness all over his body. HPI  Cardiovascular Review  The patient has hypertension and hyperlipidemia.  He reports taking medications as instructed, no medication side effects noted, no TIA's, no chest pain on exertion, no dyspnea on exertion, no swelling of ankles, no orthostatic dizziness or lightheadedness, no orthopnea or paroxysmal nocturnal dyspnea, no palpitations, no intermittent claudication symptoms, no muscle aches or pain.  Diet and Lifestyle: not attempting to follow a low fat, low cholesterol diet, not attempting to follow a low sodium diet, sedentary, alcohol intake 3-4 beers/day.  Lab review: labs reviewed and discussed with patient.    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 He had MRI of head, Cervical spine and lumbar spine. 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 He was also referred for PT. He has negative RPR, normal homocystine and methylmalonic acid levels. He  had EMG and NCV which showed sensory neuropathy in both LE. Is on Vitamin b12 and Vitamin D supplement.     Uses cane when goes out side. In home, walks without support. Today he is concerned about feeling numb all over his body, he denies burning or pain, but says, he doesn't feel any sensation     He is also on chronic PPI and Singulair    Review of Systems   Constitutional: Negative for chills, fever and malaise/fatigue.    HENT: Negative for congestion, ear pain, sore throat and tinnitus. Eyes: Negative for blurred vision, double vision, pain and discharge. Respiratory: Negative for cough, shortness of breath and wheezing. Cardiovascular: Negative for chest pain, palpitations and leg swelling. Gastrointestinal: Negative for abdominal pain, blood in stool, constipation, diarrhea, nausea and vomiting. Genitourinary: Negative for dysuria, frequency, hematuria and urgency. Musculoskeletal: Negative for back pain, joint pain and myalgias. Skin: Negative for rash. Neurological: Negative for dizziness, tremors, seizures and headaches. Numbness all over his body   Endo/Heme/Allergies: Negative for polydipsia. Does not bruise/bleed easily. Psychiatric/Behavioral: Negative for depression and substance abuse. The patient is not nervous/anxious. Physical Exam  Vitals signs and nursing note reviewed. Constitutional:       Appearance: He is well-developed. He is not diaphoretic. HENT:      Head: Normocephalic and atraumatic. Right Ear: External ear normal.      Nose: Nose normal.      Mouth/Throat:      Pharynx: No oropharyngeal exudate. Eyes:      General: No scleral icterus. Conjunctiva/sclera: Conjunctivae normal.      Pupils: Pupils are equal, round, and reactive to light. Neck:      Musculoskeletal: Normal range of motion and neck supple. Thyroid: No thyromegaly. Vascular: No JVD. Cardiovascular:      Rate and Rhythm: Normal rate and regular rhythm. Heart sounds: Normal heart sounds. No murmur. No friction rub. No gallop. Pulmonary:      Effort: Pulmonary effort is normal.      Breath sounds: Normal breath sounds. No wheezing. Abdominal:      General: Bowel sounds are normal. There is no distension. Palpations: Abdomen is soft. There is no mass. Musculoskeletal: Normal range of motion. General: No tenderness. Lymphadenopathy:      Cervical: No cervical adenopathy.    Skin:     General: Skin is warm and dry. Findings: No rash. Neurological:      Mental Status: He is alert and oriented to person, place, and time. Cranial Nerves: No cranial nerve deficit. Coordination: Coordination normal.      Deep Tendon Reflexes: Reflexes are normal and symmetric. Reflex Scores:       Tricep reflexes are 2+ on the right side and 2+ on the left side. Bicep reflexes are 2+ on the right side and 2+ on the left side. Brachioradialis reflexes are 2+ on the right side and 2+ on the left side. Comments: Decreased sensations on both lower extremities. Exaggerated knee reflex on left side. He is using cane to support when he walks. Gait has been significantly improved consistently. ASSESSMENT and PLAN  Diagnoses and all orders for this visit:    1. Essential hypertension  -     METABOLIC PANEL, COMPREHENSIVE    2. Hyperlipidemia with target LDL less than 240  -     METABOLIC PANEL, COMPREHENSIVE  -     LIPID PANEL    3. Hypertriglyceridemia  -     METABOLIC PANEL, COMPREHENSIVE  -     LIPID PANEL    4. Vitamin D deficiency  -     VITAMIN D, 25 HYDROXY    5. Vitamin B12 deficiency  -     VITAMIN B12    6. Advanced care planning/counseling discussion    7. Screening for colon cancer  -     REFERRAL TO GASTROENTEROLOGY    8. Late effect of stroke  -     amitriptyline (ELAVIL) 25 mg tablet; Take 1 Tab by mouth nightly. numbness likely late effect of his previous stroke. Will start on low dose TCA  Will refer to neurology, if no change  Discussed lifestyle issues and health guidance given  Patient was given an after visit summary which includes diagnoses, vital signs, current medications, instructions and references & authorized prescriptions . Results of labs will be conveyed to patient, once available. Pt verbalized instructions I provided and expressed understanding of discussion that was held today.   Follow-up and Dispositions    · Return in about 4 months (around 6/4/2020) for fasting, follow up.

## 2020-02-04 NOTE — PROGRESS NOTES
Chief Complaint   Patient presents with    Hypertension    Cholesterol Problem    Numbness     chronic      1. Have you been to the ER, urgent care clinic since your last visit? Hospitalized since your last visit? No    2. Have you seen or consulted any other health care providers outside of the [address] [place] [address] since your last visit? Include any pap smears or colon screening. No       Patient presents in office for fasting routine follow up for HTN and cholesterol.  Patient c/o numbness over entire body states it has been since he was young      Has not done colonoscopy or glaucoma screening

## 2020-02-04 NOTE — ACP (ADVANCE CARE PLANNING)
Advance Care Planning (ACP) Provider Conversation Snapshot    Date of ACP Conversation: 02/04/20  Persons included in Conversation:  patient and family  Length of ACP Conversation in minutes:  <16 minutes (Non-Billable)    Authorized Decision Maker (if patient is incapable of making informed decisions): Johanna Maciel, [phone]  This person is: Other Legally Authorized Decision Maker (e.g. Next of Kin)            For Patients with Decision Making Capacity:   Values/Goals: Exploration of values, goals, and preferences if recovery is not expected, even with continued medical treatment in the event of:  Imminent death  Severe, permanent brain injury  \"In these circumstances, what matters most to you? \"  Care focused more on quantity (length) of life.     Conversation Outcomes / Follow-Up Plan:   Recommended completion of Advance Directive form after review of ACP materials and conversation with prospective healthcare agent   Recommended communicating the plan and making copies for the healthcare agent, personal physician, and others as appropriate (e.g., health system)  Recommended review of completed ACP document annually or upon change in health status  strongly recommended to complete directive

## 2020-02-25 DIAGNOSIS — J06.9 VIRAL URI: ICD-10-CM

## 2020-02-25 RX ORDER — MONTELUKAST SODIUM 10 MG/1
TABLET ORAL
Qty: 30 TAB | Refills: 1 | Status: SHIPPED | OUTPATIENT
Start: 2020-02-25 | End: 2020-03-19

## 2020-02-25 NOTE — TELEPHONE ENCOUNTER
Pharmacy is requesting a refill on the following medication:    Requested Prescriptions     Pending Prescriptions Disp Refills    montelukast (SINGULAIR) 10 mg tablet 30 Tab 1     Sig: TAKE 1 TABLET BY MOUTH EVERY DAY

## 2020-02-29 DIAGNOSIS — I69.30 LATE EFFECT OF STROKE: ICD-10-CM

## 2020-02-29 NOTE — TELEPHONE ENCOUNTER
.Pharmacy is requesting a refill for 90 day supply for the medication   . Requested Prescriptions     Pending Prescriptions Disp Refills    amitriptyline (ELAVIL) 25 mg tablet 30 Tab 3     Sig: Take 1 Tab by mouth nightly.     atenoloL (TENORMIN) 25 mg tablet 30 Tab 3       Last refill : 2/4/2020, 9/9/2019  LOV :2/4/20

## 2020-03-02 RX ORDER — ATENOLOL 25 MG/1
TABLET ORAL
Qty: 90 TAB | Refills: 0 | Status: SHIPPED | OUTPATIENT
Start: 2020-03-02 | End: 2020-06-05

## 2020-03-02 RX ORDER — AMITRIPTYLINE HYDROCHLORIDE 25 MG/1
25 TABLET, FILM COATED ORAL
Qty: 90 TAB | Refills: 0 | Status: SHIPPED | OUTPATIENT
Start: 2020-03-02 | End: 2020-06-05

## 2020-03-18 DIAGNOSIS — J06.9 VIRAL URI: ICD-10-CM

## 2020-03-18 RX ORDER — CROMOLYN SODIUM 40 MG/ML
1 SOLUTION/ DROPS OPHTHALMIC 4 TIMES DAILY
Qty: 10 ML | Refills: 1 | Status: SHIPPED | OUTPATIENT
Start: 2020-03-18 | End: 2020-04-14

## 2020-03-18 NOTE — TELEPHONE ENCOUNTER
Last visit 02/04/2020 MD Matheus Kulkarni   Next appointment 06/04/2020 MD Matheus Kulkarni   Previous refill encounter(s) 1/06/2020 Opticrom #10ml with 1 refill     Requested Prescriptions     Pending Prescriptions Disp Refills    cromolyn (OPTICROM) 4 % ophthalmic solution 10 mL 1     Sig: Administer 1 Drop to both eyes four (4) times daily.

## 2020-03-19 DIAGNOSIS — J06.9 VIRAL URI: ICD-10-CM

## 2020-03-19 RX ORDER — MONTELUKAST SODIUM 10 MG/1
TABLET ORAL
Qty: 30 TAB | Refills: 1 | Status: SHIPPED | OUTPATIENT
Start: 2020-03-19 | End: 2020-04-14

## 2020-04-14 DIAGNOSIS — J06.9 VIRAL URI: ICD-10-CM

## 2020-04-14 RX ORDER — MONTELUKAST SODIUM 10 MG/1
TABLET ORAL
Qty: 30 TAB | Refills: 1 | Status: SHIPPED | OUTPATIENT
Start: 2020-04-14 | End: 2020-10-15 | Stop reason: SDUPTHER

## 2020-04-14 RX ORDER — CROMOLYN SODIUM 40 MG/ML
SOLUTION/ DROPS OPHTHALMIC
Qty: 10 ML | Refills: 1 | Status: SHIPPED | OUTPATIENT
Start: 2020-04-14 | End: 2020-10-15 | Stop reason: SDUPTHER

## 2020-05-05 RX ORDER — CHOLECALCIFEROL (VITAMIN D3) 125 MCG
CAPSULE ORAL
Qty: 30 CAP | Refills: 3 | Status: SHIPPED | OUTPATIENT
Start: 2020-05-05 | End: 2020-07-31

## 2020-05-16 DIAGNOSIS — E78.5 HYPERLIPIDEMIA WITH TARGET LDL LESS THAN 130: ICD-10-CM

## 2020-05-16 RX ORDER — ATORVASTATIN CALCIUM 20 MG/1
TABLET, FILM COATED ORAL
Qty: 30 TAB | Refills: 5 | Status: SHIPPED | OUTPATIENT
Start: 2020-05-16 | End: 2020-10-12

## 2020-06-01 ENCOUNTER — TELEPHONE (OUTPATIENT)
Dept: FAMILY MEDICINE CLINIC | Age: 70
End: 2020-06-01

## 2020-06-05 DIAGNOSIS — I69.30 LATE EFFECT OF STROKE: ICD-10-CM

## 2020-06-05 RX ORDER — AMITRIPTYLINE HYDROCHLORIDE 25 MG/1
TABLET, FILM COATED ORAL
Qty: 30 TAB | Refills: 2 | Status: SHIPPED | OUTPATIENT
Start: 2020-06-05 | End: 2020-06-29 | Stop reason: SDUPTHER

## 2020-06-05 RX ORDER — ATENOLOL 25 MG/1
TABLET ORAL
Qty: 30 TAB | Refills: 2 | Status: SHIPPED | OUTPATIENT
Start: 2020-06-05 | End: 2020-09-07

## 2020-06-11 ENCOUNTER — OFFICE VISIT (OUTPATIENT)
Dept: FAMILY MEDICINE CLINIC | Age: 70
End: 2020-06-11

## 2020-06-11 VITALS
WEIGHT: 131 LBS | HEART RATE: 71 BPM | RESPIRATION RATE: 18 BRPM | OXYGEN SATURATION: 100 % | SYSTOLIC BLOOD PRESSURE: 133 MMHG | BODY MASS INDEX: 23.21 KG/M2 | HEIGHT: 63 IN | TEMPERATURE: 97.8 F | DIASTOLIC BLOOD PRESSURE: 85 MMHG

## 2020-06-11 DIAGNOSIS — R27.8 SENSORY ATAXIA: ICD-10-CM

## 2020-06-11 DIAGNOSIS — E78.5 HYPERLIPIDEMIA WITH TARGET LDL LESS THAN 130: Primary | ICD-10-CM

## 2020-06-11 DIAGNOSIS — I69.30 LATE EFFECT OF STROKE: ICD-10-CM

## 2020-06-11 DIAGNOSIS — E53.8 VITAMIN B12 DEFICIENCY: ICD-10-CM

## 2020-06-11 DIAGNOSIS — I10 ESSENTIAL HYPERTENSION: ICD-10-CM

## 2020-06-11 DIAGNOSIS — E55.9 VITAMIN D DEFICIENCY: ICD-10-CM

## 2020-06-11 DIAGNOSIS — Z12.11 COLON CANCER SCREENING: ICD-10-CM

## 2020-06-11 DIAGNOSIS — K21.9 GASTROESOPHAGEAL REFLUX DISEASE WITHOUT ESOPHAGITIS: ICD-10-CM

## 2020-06-11 RX ORDER — PHENOL/SODIUM PHENOLATE
AEROSOL, SPRAY (ML) MUCOUS MEMBRANE
Qty: 90 TAB | Refills: 0 | Status: SHIPPED | OUTPATIENT
Start: 2020-06-11 | End: 2020-06-19

## 2020-06-11 NOTE — PROGRESS NOTES
HISTORY OF PRESENT ILLNESS  Angelica Gómez is a 78 y.o. male. Seen for follow up on HTN,dyslipidemia, sensory ataxia, GERD  HPI  Cardiovascular Review  The patient has hypertension and hyperlipidemia.  He reports taking medications as instructed, no medication side effects noted, no TIA's, no chest pain on exertion, no dyspnea on exertion, no swelling of ankles, no orthostatic dizziness or lightheadedness, no orthopnea or paroxysmal nocturnal dyspnea, no palpitations, no intermittent claudication symptoms, no muscle aches or pain.  Diet and Lifestyle: not attempting to follow a low fat, low cholesterol diet, not attempting to follow a low sodium diet, sedentary, alcohol intake 3-4 beers/day.  Lab review: labs reviewed and discussed with patient.    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 He had MRI of head, Cervical spine and lumbar spine. 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 He was also referred for PT. He has negative RPR, normal homocystine and methylmalonic acid levels. He  had EMG and NCV which showed sensory neuropathy in both LE. Is on Vitamin b12 and Vitamin D supplement.     Uses cane when goes out side. In home, walks without support.     He is also on chronic PPI and Singulair    Review of Systems   Constitutional: Negative for chills, fever and malaise/fatigue. HENT: Negative for congestion, ear pain, sore throat and tinnitus. Eyes: Negative for blurred vision, double vision, pain and discharge. Respiratory: Negative for cough, shortness of breath and wheezing. Cardiovascular: Negative for chest pain, palpitations and leg swelling. Gastrointestinal: Negative for abdominal pain, blood in stool, constipation, diarrhea, nausea and vomiting. Genitourinary: Negative for dysuria, frequency, hematuria and urgency. Musculoskeletal: Negative for back pain, joint pain and myalgias. Skin: Negative for rash. Neurological: Negative for dizziness, tremors, seizures and headaches. Endo/Heme/Allergies: Negative for polydipsia. Does not bruise/bleed easily. Psychiatric/Behavioral: Negative for depression and substance abuse. The patient is not nervous/anxious. Physical Exam  Vitals signs and nursing note reviewed. Constitutional:       Appearance: He is well-developed. He is not diaphoretic. HENT:      Head: Normocephalic and atraumatic. Right Ear: External ear normal.      Nose: Nose normal.      Mouth/Throat:      Pharynx: No oropharyngeal exudate. Eyes:      General: No scleral icterus. Conjunctiva/sclera: Conjunctivae normal.      Pupils: Pupils are equal, round, and reactive to light. Neck:      Musculoskeletal: Normal range of motion and neck supple. Thyroid: No thyromegaly. Vascular: No JVD. Cardiovascular:      Rate and Rhythm: Normal rate and regular rhythm. Heart sounds: Normal heart sounds. No murmur. No friction rub. No gallop. Pulmonary:      Effort: Pulmonary effort is normal.      Breath sounds: Normal breath sounds. No wheezing. Abdominal:      General: Bowel sounds are normal. There is no distension. Palpations: Abdomen is soft. There is no mass. Musculoskeletal: Normal range of motion. General: No tenderness. Lymphadenopathy:      Cervical: No cervical adenopathy. Skin:     General: Skin is warm and dry. Findings: No rash. Neurological:      Mental Status: He is alert and oriented to person, place, and time. Cranial Nerves: No cranial nerve deficit.       Coordination: Coordination normal.      Deep Tendon Reflexes: Reflexes are normal and symmetric. Reflex Scores:       Tricep reflexes are 2+ on the right side and 2+ on the left side. Bicep reflexes are 2+ on the right side and 2+ on the left side. Brachioradialis reflexes are 2+ on the right side and 2+ on the left side. Comments: Decreased sensations on both lower extremities. Exaggerated knee reflex on left side. He is using cane to support when he walks. Gait has been significantly improved consistently. ASSESSMENT and PLAN  Diagnoses and all orders for this visit:    1. Hyperlipidemia with target LDL less than 414  -     METABOLIC PANEL, COMPREHENSIVE  -     LIPID PANEL    2. Essential hypertension  -     METABOLIC PANEL, COMPREHENSIVE    3. Late effect of stroke    4. Vitamin B12 deficiency  -     VITAMIN B12    5. Vitamin D deficiency  -     VITAMIN D, 25 HYDROXY    6. Gastroesophageal reflux disease without esophagitis  -     Omeprazole delayed release (PRILOSEC D/R) 20 mg tablet; TAKE 1 TABLET BY MOUTH EVERY DAY    7. Sensory ataxia    8. Colon cancer screening  -     REFERRAL TO GASTROENTEROLOGY    Discussed lifestyle issues and health guidance given  Patient was given an after visit summary which includes diagnoses, vital signs, current medications, instructions and references & authorized prescriptions . Results of labs will be conveyed to patient, once available. Pt verbalized instructions I provided and expressed understanding of discussion that was held today.

## 2020-06-11 NOTE — PROGRESS NOTES
Chief Complaint   Patient presents with    Hypertension    Cholesterol Problem     1. Have you been to the ER, urgent care clinic since your last visit? Hospitalized since your last visit? No    2. Have you seen or consulted any other health care providers outside of the [address] [place] [address] since your last visit? Include any pap smears or colon screening.  No

## 2020-06-11 NOTE — PATIENT INSTRUCTIONS
Gastroesophageal Reflux Disease (GERD): Care Instructions Your Care Instructions Gastroesophageal reflux disease (GERD) is the backward flow of stomach acid into the esophagus. The esophagus is the tube that leads from your throat to your stomach. A one-way valve prevents the stomach acid from backing up into this tube. When you have GERD, this valve does not close tightly enough. This can also cause pain and swelling in your esophagus (esophagitis). If you have mild GERD symptoms including heartburn, you may be able to control the problem with antacids or over-the-counter medicine. Changing your diet and eating habits, such as not eating late at night, losing weight, and making other lifestyle changes can also help reduce symptoms. Follow-up care is a key part of your treatment and safety. Be sure to make and go to all appointments, and call your doctor if you are having problems. It's also a good idea to know your test results and keep a list of the medicines you take. How can you care for yourself at home? · Take your medicines exactly as prescribed. Call your doctor if you think you are having a problem with your medicine. · Your doctor may recommend over-the-counter medicine. For mild or occasional indigestion, antacids, such as Tums, Gaviscon, Mylanta, or Maalox, may help. Your doctor also may recommend over-the-counter acid reducers, such as Pepcid AC (famotidine), Tagamet HB (cimetidine), or Prilosec (omeprazole). Read and follow all instructions on the label. If you use these medicines often, talk with your doctor. · Change your eating habits. ? It's best to eat several small meals instead of two or three large meals. ? After you eat, wait 2 to 3 hours before you lie down. ? Chocolate, mint, and alcohol can make GERD worse. ? Spicy foods, foods that have a lot of acid (like tomatoes and oranges), and coffee can make GERD symptoms worse in some people.  If your symptoms are worse after you eat a certain food, you may want to stop eating that food to see if your symptoms get better. · Do not smoke or chew tobacco. Smoking can make GERD worse. If you need help quitting, talk to your doctor about stop-smoking programs and medicines. These can increase your chances of quitting for good. · If you have GERD symptoms at night, raise the head of your bed 6 to 8 inches by putting the frame on blocks or placing a foam wedge under the head of your mattress. (Adding extra pillows does not work.) · Do not wear tight clothing around your middle. · Lose weight if you need to. Losing just 5 to 10 pounds can help. When should you call for help? Call your doctor now or seek immediate medical care if: 
· You have new or different belly pain. · Your stools are black and tarlike or have streaks of blood. Watch closely for changes in your health, and be sure to contact your doctor if: 
· Your symptoms have not improved after 2 days. · Food seems to catch in your throat or chest. 
Where can you learn more? Go to http://bri-riley.info/ Enter X557 in the search box to learn more about \"Gastroesophageal Reflux Disease (GERD): Care Instructions. \" Current as of: August 12, 2019               Content Version: 12.5 © 9337-6174 [employer]. Care instructions adapted under license by [employer] (which disclaims liability or warranty for this information). If you have questions about a medical condition or this instruction, always ask your healthcare professional. [place] [address] any warranty or liability for your use of this information.

## 2020-06-12 LAB
25(OH)D3+25(OH)D2 SERPL-MCNC: 59.7 NG/ML (ref 30–100)
ALBUMIN SERPL-MCNC: 4.7 G/DL (ref 3.8–4.8)
ALBUMIN/GLOB SERPL: 1.4 {RATIO} (ref 1.2–2.2)
ALP SERPL-CCNC: 84 IU/L (ref 39–117)
ALT SERPL-CCNC: 24 IU/L (ref 0–44)
AST SERPL-CCNC: 20 IU/L (ref 0–40)
BILIRUB SERPL-MCNC: 0.4 MG/DL (ref 0–1.2)
BUN SERPL-MCNC: 14 MG/DL (ref 8–27)
BUN/CREAT SERPL: 13 (ref 10–24)
CALCIUM SERPL-MCNC: 9.5 MG/DL (ref 8.6–10.2)
CHLORIDE SERPL-SCNC: 103 MMOL/L (ref 96–106)
CHOLEST SERPL-MCNC: 134 MG/DL (ref 100–199)
CO2 SERPL-SCNC: 24 MMOL/L (ref 20–29)
CREAT SERPL-MCNC: 1.07 MG/DL (ref 0.76–1.27)
GLOBULIN SER CALC-MCNC: 3.3 G/DL (ref 1.5–4.5)
GLUCOSE SERPL-MCNC: 104 MG/DL (ref 65–99)
HDLC SERPL-MCNC: 33 MG/DL
INTERPRETATION, 910389: NORMAL
LDLC SERPL CALC-MCNC: 55 MG/DL (ref 0–99)
POTASSIUM SERPL-SCNC: 4.5 MMOL/L (ref 3.5–5.2)
PROT SERPL-MCNC: 8 G/DL (ref 6–8.5)
SODIUM SERPL-SCNC: 141 MMOL/L (ref 134–144)
TRIGL SERPL-MCNC: 231 MG/DL (ref 0–149)
VIT B12 SERPL-MCNC: 554 PG/ML (ref 232–1245)
VLDLC SERPL CALC-MCNC: 46 MG/DL (ref 5–40)

## 2020-06-12 NOTE — PROGRESS NOTES
Please inform PHI,  Kidney,liver function, vitamin D,vitamin B12  Levels very normal.  Cholesterol results are high but has significant improvement in LDL from last time  No change in current medicines  thanks

## 2020-06-16 ENCOUNTER — TELEPHONE (OUTPATIENT)
Dept: FAMILY MEDICINE CLINIC | Age: 70
End: 2020-06-16

## 2020-06-19 RX ORDER — PHENOL/SODIUM PHENOLATE
20 AEROSOL, SPRAY (ML) MUCOUS MEMBRANE DAILY
Qty: 90 TAB | Refills: 1 | Status: SHIPPED | OUTPATIENT
Start: 2020-06-19 | End: 2020-10-15 | Stop reason: ALTCHOICE

## 2020-06-29 DIAGNOSIS — I69.30 LATE EFFECT OF STROKE: ICD-10-CM

## 2020-06-29 RX ORDER — AMITRIPTYLINE HYDROCHLORIDE 25 MG/1
25 TABLET, FILM COATED ORAL
Qty: 90 TAB | Refills: 1 | Status: SHIPPED | OUTPATIENT
Start: 2020-06-29 | End: 2021-02-10 | Stop reason: SDUPTHER

## 2020-06-29 NOTE — TELEPHONE ENCOUNTER
Patient request for 90 d/s    Last Visit: 6/11/20  MD Ivone Gale  Next Appointment: 10/15/20  MD Ivone Gale  Previous Refill Encounter(s): 6/5/20  30 + 2 refills    Requested Prescriptions     Pending Prescriptions Disp Refills    amitriptyline (ELAVIL) 25 mg tablet 90 Tab 1     Sig: Take 1 Tab by mouth nightly.

## 2020-06-30 NOTE — TELEPHONE ENCOUNTER
Pharmacy faxed over that med be changed from tablet to capsule. MD aware and med changed. No Prior Auth Needed.

## 2020-07-31 RX ORDER — CHOLECALCIFEROL (VITAMIN D3) 125 MCG
CAPSULE ORAL
Qty: 90 CAP | Refills: 1 | Status: SHIPPED | OUTPATIENT
Start: 2020-07-31 | End: 2021-02-17

## 2020-09-07 RX ORDER — ATENOLOL 25 MG/1
TABLET ORAL
Qty: 90 TAB | Refills: 0 | Status: SHIPPED | OUTPATIENT
Start: 2020-09-07 | End: 2020-12-18

## 2020-10-12 DIAGNOSIS — E78.5 HYPERLIPIDEMIA WITH TARGET LDL LESS THAN 130: ICD-10-CM

## 2020-10-12 RX ORDER — ATORVASTATIN CALCIUM 20 MG/1
TABLET, FILM COATED ORAL
Qty: 90 TAB | Refills: 1 | Status: SHIPPED | OUTPATIENT
Start: 2020-10-12 | End: 2021-03-15 | Stop reason: SDUPTHER

## 2020-10-15 ENCOUNTER — OFFICE VISIT (OUTPATIENT)
Dept: FAMILY MEDICINE CLINIC | Age: 70
End: 2020-10-15
Payer: MEDICAID

## 2020-10-15 VITALS
HEIGHT: 63 IN | HEART RATE: 86 BPM | RESPIRATION RATE: 18 BRPM | WEIGHT: 131 LBS | SYSTOLIC BLOOD PRESSURE: 120 MMHG | OXYGEN SATURATION: 99 % | TEMPERATURE: 97.8 F | DIASTOLIC BLOOD PRESSURE: 81 MMHG | BODY MASS INDEX: 23.21 KG/M2

## 2020-10-15 DIAGNOSIS — Z12.5 PROSTATE CANCER SCREENING: ICD-10-CM

## 2020-10-15 DIAGNOSIS — E55.9 VITAMIN D DEFICIENCY: ICD-10-CM

## 2020-10-15 DIAGNOSIS — Z23 ENCOUNTER FOR IMMUNIZATION: ICD-10-CM

## 2020-10-15 DIAGNOSIS — Z91.09 ENVIRONMENTAL ALLERGIES: ICD-10-CM

## 2020-10-15 DIAGNOSIS — I10 ESSENTIAL HYPERTENSION: ICD-10-CM

## 2020-10-15 DIAGNOSIS — K21.9 GASTROESOPHAGEAL REFLUX DISEASE WITHOUT ESOPHAGITIS: ICD-10-CM

## 2020-10-15 DIAGNOSIS — E53.8 VITAMIN B12 DEFICIENCY: ICD-10-CM

## 2020-10-15 DIAGNOSIS — E78.5 HYPERLIPIDEMIA WITH TARGET LDL LESS THAN 130: Primary | ICD-10-CM

## 2020-10-15 PROCEDURE — 90471 IMMUNIZATION ADMIN: CPT

## 2020-10-15 PROCEDURE — 90653 IIV ADJUVANT VACCINE IM: CPT

## 2020-10-15 PROCEDURE — 99214 OFFICE O/P EST MOD 30 MIN: CPT | Performed by: FAMILY MEDICINE

## 2020-10-15 RX ORDER — CROMOLYN SODIUM 40 MG/ML
SOLUTION/ DROPS OPHTHALMIC
Qty: 10 ML | Refills: 1 | Status: SHIPPED | OUTPATIENT
Start: 2020-10-15 | End: 2021-03-23

## 2020-10-15 RX ORDER — MONTELUKAST SODIUM 10 MG/1
TABLET ORAL
Qty: 30 TAB | Refills: 1 | Status: SHIPPED | OUTPATIENT
Start: 2020-10-15 | End: 2020-12-28 | Stop reason: SDUPTHER

## 2020-10-15 RX ORDER — OMEPRAZOLE 20 MG/1
20 CAPSULE, DELAYED RELEASE ORAL
Qty: 90 CAP | Refills: 1 | Status: SHIPPED | OUTPATIENT
Start: 2020-10-15 | End: 2021-04-20 | Stop reason: SDUPTHER

## 2020-10-15 NOTE — PATIENT INSTRUCTIONS
DASH Diet: Care Instructions  Your Care Instructions     The DASH diet is an eating plan that can help lower your blood pressure. DASH stands for Dietary Approaches to Stop Hypertension. Hypertension is high blood pressure. The DASH diet focuses on eating foods that are high in calcium, potassium, and magnesium. These nutrients can lower blood pressure. The foods that are highest in these nutrients are fruits, vegetables, low-fat dairy products, nuts, seeds, and legumes. But taking calcium, potassium, and magnesium supplements instead of eating foods that are high in those nutrients does not have the same effect. The DASH diet also includes whole grains, fish, and poultry. The DASH diet is one of several lifestyle changes your doctor may recommend to lower your high blood pressure. Your doctor may also want you to decrease the amount of sodium in your diet. Lowering sodium while following the DASH diet can lower blood pressure even further than just the DASH diet alone. Follow-up care is a key part of your treatment and safety. Be sure to make and go to all appointments, and call your doctor if you are having problems. It's also a good idea to know your test results and keep a list of the medicines you take. How can you care for yourself at home? Following the DASH diet  · Eat 4 to 5 servings of fruit each day. A serving is 1 medium-sized piece of fruit, ½ cup chopped or canned fruit, 1/4 cup dried fruit, or 4 ounces (½ cup) of fruit juice. Choose fruit more often than fruit juice. · Eat 4 to 5 servings of vegetables each day. A serving is 1 cup of lettuce or raw leafy vegetables, ½ cup of chopped or cooked vegetables, or 4 ounces (½ cup) of vegetable juice. Choose vegetables more often than vegetable juice. · Get 2 to 3 servings of low-fat and fat-free dairy each day. A serving is 8 ounces of milk, 1 cup of yogurt, or 1 ½ ounces of cheese. · Eat 6 to 8 servings of grains each day.  A serving is 1 slice of bread, 1 ounce of dry cereal, or ½ cup of cooked rice, pasta, or cooked cereal. Try to choose whole-grain products as much as possible. · Limit lean meat, poultry, and fish to 2 servings each day. A serving is 3 ounces, about the size of a deck of cards. · Eat 4 to 5 servings of nuts, seeds, and legumes (cooked dried beans, lentils, and split peas) each week. A serving is 1/3 cup of nuts, 2 tablespoons of seeds, or ½ cup of cooked beans or peas. · Limit fats and oils to 2 to 3 servings each day. A serving is 1 teaspoon of vegetable oil or 2 tablespoons of salad dressing. · Limit sweets and added sugars to 5 servings or less a week. A serving is 1 tablespoon jelly or jam, ½ cup sorbet, or 1 cup of lemonade. · Eat less than 2,300 milligrams (mg) of sodium a day. If you limit your sodium to 1,500 mg a day, you can lower your blood pressure even more. Tips for success  · Start small. Do not try to make dramatic changes to your diet all at once. You might feel that you are missing out on your favorite foods and then be more likely to not follow the plan. Make small changes, and stick with them. Once those changes become habit, add a few more changes. · Try some of the following:  ? Make it a goal to eat a fruit or vegetable at every meal and at snacks. This will make it easy to get the recommended amount of fruits and vegetables each day. ? Try yogurt topped with fruit and nuts for a snack or healthy dessert. ? Add lettuce, tomato, cucumber, and onion to sandwiches. ? Combine a ready-made pizza crust with low-fat mozzarella cheese and lots of vegetable toppings. Try using tomatoes, squash, spinach, broccoli, carrots, cauliflower, and onions. ? Have a variety of cut-up vegetables with a low-fat dip as an appetizer instead of chips and dip. ? Sprinkle sunflower seeds or chopped almonds over salads. Or try adding chopped walnuts or almonds to cooked vegetables.   ? Try some vegetarian meals using beans and peas. Add garbanzo or kidney beans to salads. Make burritos and tacos with mashed knapp beans or black beans. Where can you learn more? Go to http://bri-riley.info/  Enter H967 in the search box to learn more about \"DASH Diet: Care Instructions. \"  Current as of: December 16, 2019               Content Version: 12.6  © 0184-3309 [employer]. Care instructions adapted under license by [employer] (which disclaims liability or warranty for this information). If you have questions about a medical condition or this instruction, always ask your healthcare professional. [address] any warranty or liability for your use of this information.

## 2020-10-15 NOTE — PROGRESS NOTES
Chief Complaint   Patient presents with    Cholesterol Problem     4 month follow up    Hypertension     1. Have you been to the ER, urgent care clinic since your last visit? Hospitalized since your last visit? No    2. Have you seen or consulted any other health care providers outside of the [address] [place] [address] since your last visit? Include any pap smears or colon screening.  No

## 2020-10-15 NOTE — PROGRESS NOTES
HISTORY OF PRESENT ILLNESS  Angelica Peña is a 78 y.o. male. seen for follow up on HTN,dyslipidemia, GERD, sensory ataxia  During fall and spring times, his allergy symptoms get worst.  HPI    Cardiovascular Review  The patient has hypertension and hyperlipidemia.  He reports taking medications as instructed, no medication side effects noted, no TIA's, no chest pain on exertion, no dyspnea on exertion, no swelling of ankles, no orthostatic dizziness or lightheadedness, no orthopnea or paroxysmal nocturnal dyspnea, no palpitations, no intermittent claudication symptoms, no muscle aches or pain.  Diet and Lifestyle: not attempting to follow a low fat, low cholesterol diet, not attempting to follow a low sodium diet, sedentary, alcohol intake 3-4 beers/day.  Lab review: labs reviewed and discussed with patient.    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 He had MRI of head, Cervical spine and lumbar spine. 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 He was also referred for PT. He has negative RPR, normal homocystine and methylmalonic acid levels. He  had EMG and NCV which showed sensory neuropathy in both LE. Is on Vitamin b12 and Vitamin D supplement.     Uses cane when goes out side. In home, walks without support.     He is also on chronic PPI and taking Singulair and nasal spray during allergy season ( fall and spring)      Review of Systems   Constitutional: Negative for chills, fever and malaise/fatigue. HENT: Negative for congestion, ear pain, sore throat and tinnitus.     Eyes: Negative for blurred vision, double vision, pain and discharge. Respiratory: Negative for cough, shortness of breath and wheezing. Cardiovascular: Negative for chest pain, palpitations and leg swelling. Gastrointestinal: Negative for abdominal pain, blood in stool, constipation, diarrhea, nausea and vomiting. Genitourinary: Negative for dysuria, frequency, hematuria and urgency. Musculoskeletal: Negative for back pain, joint pain and myalgias. Skin: Negative for rash. Neurological: Negative for dizziness, tremors, seizures and headaches. Endo/Heme/Allergies: Negative for polydipsia. Does not bruise/bleed easily. Psychiatric/Behavioral: Negative for depression and substance abuse. The patient is not nervous/anxious. Physical Exam  Vitals signs and nursing note reviewed. Constitutional:       Appearance: He is well-developed. He is not diaphoretic. HENT:      Head: Normocephalic and atraumatic. Right Ear: External ear normal.      Mouth/Throat:      Pharynx: No oropharyngeal exudate. Eyes:      General: No scleral icterus. Conjunctiva/sclera: Conjunctivae normal.      Pupils: Pupils are equal, round, and reactive to light. Neck:      Musculoskeletal: Normal range of motion and neck supple. Thyroid: No thyromegaly. Vascular: No JVD. Cardiovascular:      Rate and Rhythm: Normal rate and regular rhythm. Heart sounds: Normal heart sounds. No murmur. Pulmonary:      Effort: Pulmonary effort is normal.      Breath sounds: Normal breath sounds. No wheezing. Abdominal:      General: Bowel sounds are normal. There is no distension. Palpations: Abdomen is soft. There is no mass. Musculoskeletal: Normal range of motion. General: No tenderness. Lymphadenopathy:      Cervical: No cervical adenopathy. Skin:     General: Skin is warm and dry. Findings: No rash.    Neurological:      Mental Status: He is alert and oriented to person, place, and time.      Cranial Nerves: No cranial nerve deficit. Deep Tendon Reflexes: Reflexes are normal and symmetric. Reflexes normal.         ASSESSMENT and PLAN  Diagnoses and all orders for this visit:    1. Hyperlipidemia with target LDL less than 889  -     METABOLIC PANEL, COMPREHENSIVE  -     LIPID PANEL    2. Essential hypertension  -     METABOLIC PANEL, COMPREHENSIVE    3. Gastroesophageal reflux disease without esophagitis  -     omeprazole (PRILOSEC) 20 mg capsule; Take 1 Cap by mouth Daily (before breakfast). 4. Environmental allergies  -     cromolyn (OPTICROM) 4 % ophthalmic solution; PLACE 1 DROP IN BOTH EYES 4 TIMES A DAY  -     montelukast (SINGULAIR) 10 mg tablet; TAKE 1 TABLET BY MOUTH EVERY DAY    5. Encounter for immunization  -     INFLUENZA, HIGH DOSE SEASONAL    6. Prostate cancer screening  -     PSA W/ REFLX FREE PSA    7. Vitamin D deficiency    8. Vitamin B12 deficiency    stable vitamin levels, will not repeat labs for it  Discussed lifestyle issues and health guidance given  Patient was given an after visit summary which includes diagnoses, vital signs, current medications, instructions and references & authorized prescriptions . Results of labs will be conveyed to patient, once available. Pt verbalized instructions I provided and expressed understanding of discussion that was held today. Follow-up and Dispositions    · Return in about 4 months (around 2/15/2021).

## 2020-10-16 LAB
ALBUMIN SERPL-MCNC: 4.7 G/DL (ref 3.8–4.8)
ALBUMIN/GLOB SERPL: 1.6 {RATIO} (ref 1.2–2.2)
ALP SERPL-CCNC: 104 IU/L (ref 39–117)
ALT SERPL-CCNC: 31 IU/L (ref 0–44)
AST SERPL-CCNC: 22 IU/L (ref 0–40)
BILIRUB SERPL-MCNC: 0.6 MG/DL (ref 0–1.2)
BUN SERPL-MCNC: 12 MG/DL (ref 8–27)
BUN/CREAT SERPL: 12 (ref 10–24)
CALCIUM SERPL-MCNC: 10 MG/DL (ref 8.6–10.2)
CHLORIDE SERPL-SCNC: 103 MMOL/L (ref 96–106)
CHOLEST SERPL-MCNC: 134 MG/DL (ref 100–199)
CO2 SERPL-SCNC: 23 MMOL/L (ref 20–29)
CREAT SERPL-MCNC: 1.02 MG/DL (ref 0.76–1.27)
GLOBULIN SER CALC-MCNC: 3 G/DL (ref 1.5–4.5)
GLUCOSE SERPL-MCNC: 93 MG/DL (ref 65–99)
HDLC SERPL-MCNC: 32 MG/DL
INTERPRETATION, 910389: NORMAL
LDLC SERPL CALC-MCNC: 64 MG/DL (ref 0–99)
POTASSIUM SERPL-SCNC: 4.3 MMOL/L (ref 3.5–5.2)
PROT SERPL-MCNC: 7.7 G/DL (ref 6–8.5)
PSA SERPL-MCNC: 0.8 NG/ML (ref 0–4)
REFLEX CRITERIA: NORMAL
SODIUM SERPL-SCNC: 141 MMOL/L (ref 134–144)
TRIGL SERPL-MCNC: 230 MG/DL (ref 0–149)
VLDLC SERPL CALC-MCNC: 38 MG/DL (ref 5–40)

## 2020-10-16 NOTE — PROGRESS NOTES
Outbound call to patient name and  verified. Patient was advised of recent test results and understands.

## 2020-10-16 NOTE — PROGRESS NOTES
Please inform patient,  Kidney,liver,prostate cancer screen, all stable  Cholesterol ok except elevated Triglycerides.  Need to watch diet more strictly for fried food and red meat  thanks

## 2020-10-21 ENCOUNTER — TELEPHONE (OUTPATIENT)
Dept: FAMILY MEDICINE CLINIC | Age: 70
End: 2020-10-21

## 2020-10-21 NOTE — TELEPHONE ENCOUNTER
----- Message from Vanessa Ferrell, [address] sent at 10/21/2020  4:58 PM EDT -----  Regarding: FW: /Telephone  Please advise  ----- Message -----  From: Kamini Hall  Sent: 10/21/2020   3:11 PM EDT  To: Vanessa Ferrell, CMA  Subject: FW: /Telephone                            ----- Message -----  From: Angelika Allen  Sent: 10/21/2020   4:38 PM EDT  To: [facility] Front Office Pool  Subject: /Telephone                              General Message/Vendor Calls    Caller's first and last name: YULI Ely [facility])      Reason for call: Mariana Meadows wants to inform PCP that he has not been taking his medication for gastric reflux because it ran out months ago.       Callback required yes/no and why: yes      Best contact number(s): [phone]      Details to clarify the request: N/A      Randolph Mathews

## 2020-10-21 NOTE — TELEPHONE ENCOUNTER
I have already  Discussed with patient on his recent appointment and new Rx of Omeprazole was sent on same day, 10/15/2020. Patient is aware of it. Not sure what Ms Saurabh Perico is referring about.   thanks

## 2020-12-18 RX ORDER — ATENOLOL 25 MG/1
TABLET ORAL
Qty: 90 TAB | Refills: 0 | Status: SHIPPED | OUTPATIENT
Start: 2020-12-18 | End: 2021-03-15 | Stop reason: SDUPTHER

## 2020-12-28 DIAGNOSIS — Z91.09 ENVIRONMENTAL ALLERGIES: ICD-10-CM

## 2020-12-28 RX ORDER — MONTELUKAST SODIUM 10 MG/1
TABLET ORAL
Qty: 30 TAB | Refills: 5 | Status: SHIPPED | OUTPATIENT
Start: 2020-12-28 | End: 2021-10-19 | Stop reason: SDUPTHER

## 2020-12-28 NOTE — TELEPHONE ENCOUNTER
Last Visit: 10/15/20  MD Lizabeth Dickerson  Next Appointment: 3/15/21  MD Lizabeth Dickerson  Previous Refill Encounter(s): 10/15/20 30 + 1    Requested Prescriptions     Pending Prescriptions Disp Refills    montelukast (SINGULAIR) 10 mg tablet 30 Tab 5     Sig: TAKE 1 TABLET BY MOUTH EVERY DAY

## 2021-01-06 ENCOUNTER — ANESTHESIA EVENT (OUTPATIENT)
Dept: ENDOSCOPY | Age: 71
End: 2021-01-06
Payer: MEDICAID

## 2021-01-06 ENCOUNTER — ANESTHESIA (OUTPATIENT)
Dept: ENDOSCOPY | Age: 71
End: 2021-01-06
Payer: MEDICAID

## 2021-01-06 ENCOUNTER — HOSPITAL ENCOUNTER (OUTPATIENT)
Age: 71
Setting detail: OUTPATIENT SURGERY
Discharge: HOME | End: 2021-01-06
Attending: INTERNAL MEDICINE | Admitting: INTERNAL MEDICINE
Payer: MEDICAID

## 2021-01-06 VITALS
TEMPERATURE: 97.1 F | RESPIRATION RATE: 18 BRPM | HEART RATE: 55 BPM | WEIGHT: 131 LBS | OXYGEN SATURATION: 100 % | SYSTOLIC BLOOD PRESSURE: 142 MMHG | HEIGHT: 63 IN | BODY MASS INDEX: 23.21 KG/M2 | DIASTOLIC BLOOD PRESSURE: 75 MMHG

## 2021-01-06 LAB
H PYLORI FROM TISSUE: NEGATIVE
KIT LOT NO., HCLOLOT: NORMAL
NEGATIVE CONTROL: NEGATIVE
POSITIVE CONTROL: POSITIVE

## 2021-01-06 PROCEDURE — 77030021593 HC FCPS BIOP ENDOSC BSC -A: Performed by: INTERNAL MEDICINE

## 2021-01-06 PROCEDURE — 74011000250 HC RX REV CODE- 250: Performed by: NURSE ANESTHETIST, CERTIFIED REGISTERED

## 2021-01-06 PROCEDURE — 87077 CULTURE AEROBIC IDENTIFY: CPT | Performed by: INTERNAL MEDICINE

## 2021-01-06 PROCEDURE — 76040000019: Performed by: INTERNAL MEDICINE

## 2021-01-06 PROCEDURE — 2709999900 HC NON-CHARGEABLE SUPPLY: Performed by: INTERNAL MEDICINE

## 2021-01-06 PROCEDURE — 74011250636 HC RX REV CODE- 250/636: Performed by: NURSE ANESTHETIST, CERTIFIED REGISTERED

## 2021-01-06 PROCEDURE — 76060000031 HC ANESTHESIA FIRST 0.5 HR: Performed by: INTERNAL MEDICINE

## 2021-01-06 RX ORDER — DEXTROMETHORPHAN/PSEUDOEPHED 2.5-7.5/.8
1.2 DROPS ORAL
Status: DISCONTINUED | OUTPATIENT
Start: 2021-01-06 | End: 2021-01-06 | Stop reason: HOSPADM

## 2021-01-06 RX ORDER — NALOXONE HYDROCHLORIDE 0.4 MG/ML
0.4 INJECTION, SOLUTION INTRAMUSCULAR; INTRAVENOUS; SUBCUTANEOUS
Status: DISCONTINUED | OUTPATIENT
Start: 2021-01-06 | End: 2021-01-06 | Stop reason: HOSPADM

## 2021-01-06 RX ORDER — PROPOFOL 10 MG/ML
INJECTION, EMULSION INTRAVENOUS AS NEEDED
Status: DISCONTINUED | OUTPATIENT
Start: 2021-01-06 | End: 2021-01-06 | Stop reason: HOSPADM

## 2021-01-06 RX ORDER — SODIUM CHLORIDE 9 MG/ML
INJECTION, SOLUTION INTRAVENOUS
Status: DISCONTINUED | OUTPATIENT
Start: 2021-01-06 | End: 2021-01-06 | Stop reason: HOSPADM

## 2021-01-06 RX ORDER — FLUMAZENIL 0.1 MG/ML
0.2 INJECTION INTRAVENOUS
Status: DISCONTINUED | OUTPATIENT
Start: 2021-01-06 | End: 2021-01-06 | Stop reason: HOSPADM

## 2021-01-06 RX ORDER — SODIUM CHLORIDE 9 MG/ML
50 INJECTION, SOLUTION INTRAVENOUS CONTINUOUS
Status: DISCONTINUED | OUTPATIENT
Start: 2021-01-06 | End: 2021-01-06 | Stop reason: HOSPADM

## 2021-01-06 RX ORDER — EPINEPHRINE 0.1 MG/ML
1 INJECTION INTRACARDIAC; INTRAVENOUS
Status: DISCONTINUED | OUTPATIENT
Start: 2021-01-06 | End: 2021-01-06 | Stop reason: HOSPADM

## 2021-01-06 RX ORDER — ATROPINE SULFATE 0.1 MG/ML
0.5 INJECTION INTRAVENOUS
Status: DISCONTINUED | OUTPATIENT
Start: 2021-01-06 | End: 2021-01-06 | Stop reason: HOSPADM

## 2021-01-06 RX ORDER — LIDOCAINE HYDROCHLORIDE 20 MG/ML
INJECTION, SOLUTION EPIDURAL; INFILTRATION; INTRACAUDAL; PERINEURAL AS NEEDED
Status: DISCONTINUED | OUTPATIENT
Start: 2021-01-06 | End: 2021-01-06 | Stop reason: HOSPADM

## 2021-01-06 RX ORDER — SODIUM CHLORIDE 0.9 % (FLUSH) 0.9 %
5-40 SYRINGE (ML) INJECTION AS NEEDED
Status: DISCONTINUED | OUTPATIENT
Start: 2021-01-06 | End: 2021-01-06 | Stop reason: HOSPADM

## 2021-01-06 RX ORDER — SODIUM CHLORIDE 0.9 % (FLUSH) 0.9 %
5-40 SYRINGE (ML) INJECTION EVERY 8 HOURS
Status: DISCONTINUED | OUTPATIENT
Start: 2021-01-06 | End: 2021-01-06 | Stop reason: HOSPADM

## 2021-01-06 RX ADMIN — PROPOFOL 50 MG: 10 INJECTION, EMULSION INTRAVENOUS at 14:22

## 2021-01-06 RX ADMIN — PROPOFOL 50 MG: 10 INJECTION, EMULSION INTRAVENOUS at 14:32

## 2021-01-06 RX ADMIN — LIDOCAINE HYDROCHLORIDE 60 MG: 20 INJECTION, SOLUTION EPIDURAL; INFILTRATION; INTRACAUDAL; PERINEURAL at 14:16

## 2021-01-06 RX ADMIN — PROPOFOL 50 MG: 10 INJECTION, EMULSION INTRAVENOUS at 14:18

## 2021-01-06 RX ADMIN — PROPOFOL 50 MG: 10 INJECTION, EMULSION INTRAVENOUS at 14:20

## 2021-01-06 RX ADMIN — SODIUM CHLORIDE: 900 INJECTION, SOLUTION INTRAVENOUS at 14:14

## 2021-01-06 RX ADMIN — PROPOFOL 50 MG: 10 INJECTION, EMULSION INTRAVENOUS at 14:26

## 2021-01-06 RX ADMIN — PROPOFOL 50 MG: 10 INJECTION, EMULSION INTRAVENOUS at 14:16

## 2021-01-06 NOTE — ANESTHESIA POSTPROCEDURE EVALUATION
Post-Anesthesia Evaluation and Assessment    Patient: Judy Bean MRN: [id number]  SSN: [id number]    YOB: 1950  Age: 73 y.o. Sex: male      I have evaluated the patient and they are stable and ready for discharge from the PACU. Cardiovascular Function/Vital Signs  Visit Vitals  BP (!) 71/48   Pulse (!) 49   Temp 36.2 °C (97.1 °F)   Resp 20   Ht 5' 3\" (1.6 m)   Wt 59.4 kg (131 lb)   SpO2 99%   BMI 23.21 kg/m²       Patient is status post MAC anesthesia for Procedure(s):  COLONOSCOPY,EGD  ESOPHAGOGASTRODUODENOSCOPY (EGD) patient bring test results with him  ESOPHAGOGASTRODUODENAL (EGD) BIOPSY. Nausea/Vomiting: None    Postoperative hydration reviewed and adequate. Pain:  Pain Scale 1: Visual (01/06/21 1448)  Pain Intensity 1: 0 (01/06/21 1448)   Managed    Neurological Status: At baseline    Mental Status, Level of Consciousness: Alert and  oriented to person, place, and time    Pulmonary Status:   O2 Device: Nasal cannula (01/06/21 1448)   Adequate oxygenation and airway patent    Complications related to anesthesia: None    Post-anesthesia assessment completed. No concerns    Signed By: Destiny Arechiga MD     January 6, 2021              Procedure(s):  COLONOSCOPY,EGD  ESOPHAGOGASTRODUODENOSCOPY (EGD) patient bring test results with him  ESOPHAGOGASTRODUODENAL (EGD) BIOPSY. MAC    <BSHSIANPOST>    INITIAL Post-op Vital signs:   Vitals Value Taken Time   /60 01/06/21 1500   Temp 36.2 °C (97.1 °F) 01/06/21 1448   Pulse 46 01/06/21 1505   Resp 15 01/06/21 1505   SpO2 100 % 01/06/21 1505   Vitals shown include unvalidated device data.

## 2021-01-06 NOTE — PERIOP NOTES

## 2021-01-06 NOTE — DISCHARGE INSTRUCTIONS
[address]  [address] [address] [address], [place] [address]  [address]  [id number]  1950    DISCOMFORT:  Redness at IV site- apply warm compress to area; if redness or soreness persist- contact your physician  There may be a slight amount of blood passed from the rectum  Gaseous discomfort- walking, belching will help relieve any discomfort    DIET:   High fiber diet. GERD diet: avoid fried and fatty foods. peppermint, chocolate, alcohol, coffee, citrus fruits and juices, tomoato products; avoid lying down for 2 to 3 hours after eating.   - however -  remember your colon is empty and a heavy meal will produce gas. Avoid these foods:  vegetables, fried / greasy foods, carbonated drinks for today   You may not  drink alcoholic beverages for at least 12 hours      ACTIVITY  Spend the remainder of the day resting -  avoid any strenuous activity, then you may resume your normal daily activities   You may not operate a vehicle for 12 hours  You may not  engage in an occupation involving machinery or appliances for rest of today  Avoid making any critical decisions for at least 24 hour    CALL M.D. ANY SIGN OF   Increasing pain, nausea, vomiting  Abdominal distension (swelling)  New increased bleeding (oral or rectal)  Fever (chills)  Pain in chest area  Bloody discharge from nose or mouth  Shortness of breath    You may not  take any Advil, Aspirin, Ibuprofen, Motrin, Aleve, or Goodys for 3 days, ONLY  Tylenol as needed for pain. Post procedure diagnosis: Hiatial hernia  Hemmroids      Follow-up Instructions:   Call Dr. Kelle Wasserman for any questions or problems. If we took a biopsy please call the office within 2 weeks to discuss your pathology results. Telephone # [phone]         Learning About Coronavirus [phone])  Coronavirus [phone]): Overview  What is coronavirus (COVID-19)?   The coronavirus disease (COVID-19) is caused by a virus. It is an illness that was first found in [place], [place], in December 2019. It has since spread worldwide. The virus can cause fever, cough, and trouble breathing. In severe cases, it can cause pneumonia and make it hard to breathe without help. It can cause death. Coronaviruses are a large group of viruses. They cause the common cold. They also cause more serious illnesses like Middle East respiratory syndrome (MERS) and severe acute respiratory syndrome (SARS). COVID-19 is caused by a novel coronavirus. That means it's a new type that has not been seen in people before. This virus spreads person-to-person through droplets from coughing and sneezing. It can also spread when you are close to someone who is infected. And it can spread when you touch something that has the virus on it, such as a doorknob or a tabletop. What can you do to protect yourself from coronavirus (COVID-19)? The best way to protect yourself from getting sick is to:  · Avoid areas where there is an outbreak. · Avoid contact with people who may be infected. · Wash your hands often with soap or alcohol-based hand sanitizers. · Avoid crowds and try to stay at least 6 feet away from other people. · Wash your hands often, especially after you cough or sneeze. Use soap and water, and scrub for at least 20 seconds. If soap and water aren't available, use an alcohol-based hand . · Avoid touching your mouth, nose, and eyes. What can you do to avoid spreading the virus to others? To help avoid spreading the virus to others:  · Cover your mouth with a tissue when you cough or sneeze. Then throw the tissue in the trash. · Use a disinfectant to clean things that you touch often. · Stay home if you are sick or have been exposed to the virus. Don't go to school, work, or public areas. And don't use public transportation. · If you are sick:  ? Leave your home only if you need to get medical care.  But call the doctor's office first so they know you're coming. And wear a face mask, if you have one.  ? If you have a face mask, wear it whenever you're around other people. It can help stop the spread of the virus when you cough or sneeze. ? Clean and disinfect your home every day. Use household  and disinfectant wipes or sprays. Take special care to clean things that you grab with your hands. These include doorknobs, remote controls, phones, and handles on your refrigerator and microwave. And don't forget countertops, tabletops, bathrooms, and computer keyboards. When to call for help  Call 911 anytime you think you may need emergency care. For example, call if:  · You have severe trouble breathing. (You can't talk at all.)  · You have constant chest pain or pressure. · You are severely dizzy or lightheaded. · You are confused or can't think clearly. · Your face and lips have a blue color. · You pass out (lose consciousness) or are very hard to wake up. Call your doctor now if you develop symptoms such as:  · Shortness of breath. · Fever. · Cough. If you need to get care, call ahead to the doctor's office for instructions before you go. Make sure you wear a face mask, if you have one, to prevent exposing other people to the virus. Where can you get the latest information? The following health organizations are tracking and studying this virus. Their websites contain the most up-to-date information. Nicolettezoe Jered also learn what to do if you think you may have been exposed to the virus. · U.S. Centers for Disease Control and Prevention (CDC): The CDC provides updated news about the disease and travel advice. The website also tells you how to prevent the spread of infection. www.cdc.gov  · World Health Organization [facility]): WHO offers information about the virus outbreaks.  WHO also has travel advice. www.who.int  Current as of: April 1, 2020               Content Version: 12.4  © 1615-9421 [employer]. Care instructions adapted under license by your healthcare professional. If you have questions about a medical condition or this instruction, always ask your healthcare professional. [address] any warranty or liability for your use of this information.

## 2021-01-06 NOTE — H&P
[address] [facility]  [address] [address] [address], [address]  [phone]                                History and Physical     NAME: Renu Gupta   :  1950   MRN:  [id number]     HPI:  The patient was seen and examined. No past surgical history on file. Past Medical History:   Diagnosis Date    Acid indigestion     B12 deficiency 13    level 208    Hypercholesterolemia     Hypertension     Stroke [facility])      Social History     Tobacco Use    Smoking status: Never Smoker    Smokeless tobacco: Current User     Types: Snuff   Substance Use Topics    Alcohol use: No     Alcohol/week: 0.0 standard drinks     Comment: Social.  Patient currently is not drinking for 4 months     Drug use: No     No Known Allergies  No family history on file. Current Facility-Administered Medications   Medication Dose Route Frequency    0.9% sodium chloride infusion  50 mL/hr IntraVENous CONTINUOUS    sodium chloride (NS) flush 5-40 mL  5-40 mL IntraVENous Q8H    sodium chloride (NS) flush 5-40 mL  5-40 mL IntraVENous PRN    naloxone (NARCAN) injection 0.4 mg  0.4 mg IntraVENous Multiple    flumazeniL (ROMAZICON) 0.1 mg/mL injection 0.2 mg  0.2 mg IntraVENous Multiple    simethicone (MYLICON) 72UO/4.8DP oral drops 80 mg  1.2 mL Oral Multiple    atropine injection 0.5 mg  0.5 mg IntraVENous ONCE PRN    EPINEPHrine (ADRENALIN) 0.1 mg/mL syringe 1 mg  1 mg Endoscopically ONCE PRN         PHYSICAL EXAM:  General: WD, WN. Alert, cooperative, no acute distress    HEENT: NC, Atraumatic. PERRLA, EOMI. Anicteric sclerae. Lungs:  CTA Bilaterally. No Wheezing/Rhonchi/Rales. Heart:  Regular  rhythm,  No murmur, No Rubs, No Gallops  Abdomen: Soft, Non distended, Non tender. +Bowel sounds, no HSM  Extremities: No c/c/e  Neurologic:  CN 2-12 gi, Alert and oriented X 3. No acute neurological distress   Psych:   Good insight. Not anxious nor agitated.     The heart, lungs and mental status were satisfactory for the administration of MAC sedation and for the procedure. Mallampati score: 2     The patient was counseled at length about the risks of phil Covid-19 in the jared-operative and post-operative states including the recovery window of their procedure. The patient was made aware that phil Covid-19 after a surgical procedure may worsen their prognosis for recovering from the virus and lend to a higher morbidity and or mortality risk. The patient was given the options of postponing their procedure. All of the risks, benefits, and alternatives were discussed. The patient does  wish to proceed with the procedure.       Assessment:   · GERD  · Personal history colon polyps    Plan:   · Endoscopic procedure  · MAC sedation   ·

## 2021-01-06 NOTE — ANESTHESIA PREPROCEDURE EVALUATION
Relevant Problems   CARDIOVASCULAR   (+) Essential hypertension       Anesthetic History   No history of anesthetic complications            Review of Systems / Medical History  Patient summary reviewed, nursing notes reviewed and pertinent labs reviewed    Pulmonary            Asthma        Neuro/Psych       CVA       Cardiovascular    Hypertension: well controlled              Exercise tolerance: >4 METS     GI/Hepatic/Renal     GERD: well controlled           Endo/Other        Arthritis     Other Findings              Physical Exam    Airway  Mallampati: II  TM Distance: > 6 cm  Neck ROM: normal range of motion   Mouth opening: Normal     Cardiovascular  Regular rate and rhythm,  S1 and S2 normal,  no murmur, click, rub, or gallop             Dental  No notable dental hx       Pulmonary  Breath sounds clear to auscultation               Abdominal  GI exam deferred       Other Findings            Anesthetic Plan    ASA: 3  Anesthesia type: MAC          Induction: Intravenous  Anesthetic plan and risks discussed with: Patient

## 2021-01-06 NOTE — PROGRESS NOTES
When patient came back to recovery he was very sonorous and his Oxygen sats were dropping down to mid 80's. Did tongue-jaw lift maneuver and his sats would come up. Put non-rebreather on him and sats up to 100%. Still very sonorous. BP was low when he first came into recovery, but was given NS at a rapid rate and his BP has come up nicely.

## 2021-01-06 NOTE — ROUTINE PROCESS
Angelica QUINONES Bobby 1950 
[id number] Situation: 
Verbal report received from:Anel Mills 
Procedure: Procedure(s): 
COLONOSCOPY,EGD 
ESOPHAGOGASTRODUODENOSCOPY (EGD) patient bring test results with him ESOPHAGOGASTRODUODENAL (EGD) BIOPSY Background: 
 
Preoperative diagnosis: PERSONAL HX OF POLYPS    
GERD Postoperative diagnosis: Hiatial hernia Hemmroids :  Dr. Enrrique Laura 
Assistant(s): Endoscopy Technician-1: Abilio Hinkle Endoscopy RN-1: Shemar Thomas RN Specimens: * No specimens in log * H. Pylori  yes Assessment: 
Intra-procedure medications Anesthesia gave intra-procedure sedation and medications, see anesthesia flow sheet yes Intravenous fluids: NS@ [facility] Vital signs stable Abdominal assessment: round and soft Recommendation: 
Discharge patient per MD order. Family or Friend Son and  Permission to share finding with family or friend yes

## 2021-01-06 NOTE — PROCEDURES
[address]  [address], [address]  [phone]                           Colonoscopy and EGD Procedure Note        Indications:    Personal history of colon polyps (screening only), GERD      :  Rudi Santacrzu MD     Staff: * No surgical staff found *      Implants: none     Referring Provider:   UNKNOWN     Sedation:  MAC anesthesia     Procedure Details:  After informed consent was obtained with all risks and benefits of procedure explained and preoperative exam completed, the patient was taken to the endoscopy suite and placed in the left lateral decubitus position. Upon sequential sedation as per above, a digital rectal exam was performed  And was normal.  The Olympus videocolonoscope  was inserted in the rectum and carefully advanced to the cecum, which was identified by the ileocecal valve and appendiceal orifice. The quality of preparation was good. The colonoscope was slowly withdrawn with careful evaluation between folds. Retroflexion in the rectum was performed and was normal..      Colon Findings:   Rectum: no mucosal lesion appreciated  Grade 1 internal hemorrhoid(s); Sigmoid: no mucosal lesion appreciated  Descending Colon: no mucosal lesion appreciated  Transverse Colon: no mucosal lesion appreciated  Ascending Colon: no mucosal lesion appreciated  Cecum: no mucosal lesion appreciated  Terminal Ileum: not intubated        Following sequential administration of sedation as per above, the [id number] gastroscope was inserted into the mouth and advanced under direct vision to second portion of the duodenum. A careful inspection was made as the gastroscope was withdrawn, including a retroflexed view of the proximal stomach; findings and interventions are described below.       EGD Findings:  Esophagus: Small sliding hiatal hernia was noted. No esophagitis or ulceration was noted.   Stomach: Mild patchy erythema was noted in antrum and body.  Duodenum/jejunum:normal        Therapies:  biopsy of stomach body, antrum for NELIDA test     Complications:   None; patient tolerated the procedure well. Impression:    See Postoperative diagnosis above     Recommendations:  -Continue acid suppression. , -Await NELIDA test result and treat for Helicobacter pylori if positive. , -Follow symptoms. , -High fiber diet. GERD diet: avoid fried and fatty foods. peppermint, chocolate, alcohol, coffee, citrus fruits and juices, tomoato products; avoid lying down for 2 to 3 hours after eating.  -Repeat colonoscopy in 10 years     -Resume normal medication(s).       Interventions: Biopsy of gastric antrum and body for CLOtest     Complications: None.      Specimen Removed:  * No specimens in log *     EBL:  None.     Discharge Disposition:  Home in the company of a  when able to ambulate.     Mercedes Jefferson MD  1/6/2021  2:52 PM

## 2021-02-10 DIAGNOSIS — I69.30 LATE EFFECT OF STROKE: ICD-10-CM

## 2021-02-10 RX ORDER — AMITRIPTYLINE HYDROCHLORIDE 25 MG/1
25 TABLET, FILM COATED ORAL
Qty: 90 TAB | Refills: 1 | Status: SHIPPED | OUTPATIENT
Start: 2021-02-10 | End: 2021-03-15 | Stop reason: SDUPTHER

## 2021-02-10 NOTE — TELEPHONE ENCOUNTER
Last visit 10/15/2020 MD Connor Goemz   Next appointment 03/15/2021 MD Buzz Celestin   Previous refill encounter(s)   06/29/2020 Elavil #90 with 1 refill     Requested Prescriptions     Pending Prescriptions Disp Refills    amitriptyline (ELAVIL) 25 mg tablet 90 Tab 1     Sig: Take 1 Tab by mouth nightly.

## 2021-02-17 RX ORDER — CHOLECALCIFEROL (VITAMIN D3) 125 MCG
CAPSULE ORAL
Qty: 90 CAP | Refills: 1 | Status: SHIPPED | OUTPATIENT
Start: 2021-02-17 | End: 2021-10-19 | Stop reason: SDUPTHER

## 2021-03-15 ENCOUNTER — OFFICE VISIT (OUTPATIENT)
Dept: FAMILY MEDICINE CLINIC | Age: 71
End: 2021-03-15
Payer: MEDICAID

## 2021-03-15 VITALS
HEIGHT: 63 IN | BODY MASS INDEX: 23.6 KG/M2 | DIASTOLIC BLOOD PRESSURE: 81 MMHG | RESPIRATION RATE: 18 BRPM | HEART RATE: 60 BPM | SYSTOLIC BLOOD PRESSURE: 138 MMHG | OXYGEN SATURATION: 98 % | WEIGHT: 133.2 LBS | TEMPERATURE: 97.4 F

## 2021-03-15 DIAGNOSIS — I69.30 LATE EFFECT OF STROKE: ICD-10-CM

## 2021-03-15 DIAGNOSIS — E78.5 HYPERLIPIDEMIA WITH TARGET LDL LESS THAN 130: Primary | ICD-10-CM

## 2021-03-15 DIAGNOSIS — I10 ESSENTIAL HYPERTENSION: ICD-10-CM

## 2021-03-15 DIAGNOSIS — R27.8 SENSORY ATAXIA: ICD-10-CM

## 2021-03-15 DIAGNOSIS — H40.9 GLAUCOMA OF BOTH EYES, UNSPECIFIED GLAUCOMA TYPE: ICD-10-CM

## 2021-03-15 DIAGNOSIS — E55.9 VITAMIN D DEFICIENCY: ICD-10-CM

## 2021-03-15 LAB
25(OH)D3 SERPL-MCNC: 62.7 NG/ML (ref 30–100)
ALBUMIN SERPL-MCNC: 4.3 G/DL (ref 3.5–5)
ALBUMIN/GLOB SERPL: 1.1 {RATIO} (ref 1.1–2.2)
ALP SERPL-CCNC: 103 U/L (ref 45–117)
ALT SERPL-CCNC: 47 U/L (ref 12–78)
ANION GAP SERPL CALC-SCNC: 2 MMOL/L (ref 5–15)
AST SERPL-CCNC: 24 U/L (ref 15–37)
BILIRUB SERPL-MCNC: 0.7 MG/DL (ref 0.2–1)
BUN SERPL-MCNC: 14 MG/DL (ref 6–20)
BUN/CREAT SERPL: 13 (ref 12–20)
CALCIUM SERPL-MCNC: 9.4 MG/DL (ref 8.5–10.1)
CHLORIDE SERPL-SCNC: 107 MMOL/L (ref 97–108)
CHOLEST SERPL-MCNC: 125 MG/DL
CO2 SERPL-SCNC: 29 MMOL/L (ref 21–32)
CREAT SERPL-MCNC: 1.11 MG/DL (ref 0.7–1.3)
GLOBULIN SER CALC-MCNC: 3.8 G/DL (ref 2–4)
GLUCOSE SERPL-MCNC: 93 MG/DL (ref 65–100)
HDLC SERPL-MCNC: 39 MG/DL
HDLC SERPL: 3.2 {RATIO} (ref 0–5)
LDLC SERPL CALC-MCNC: 52.4 MG/DL (ref 0–100)
LIPID PROFILE,FLP: ABNORMAL
POTASSIUM SERPL-SCNC: 4.4 MMOL/L (ref 3.5–5.1)
PROT SERPL-MCNC: 8.1 G/DL (ref 6.4–8.2)
SODIUM SERPL-SCNC: 138 MMOL/L (ref 136–145)
TRIGL SERPL-MCNC: 168 MG/DL (ref ?–150)
VIT B12 SERPL-MCNC: 533 PG/ML (ref 193–986)
VLDLC SERPL CALC-MCNC: 33.6 MG/DL

## 2021-03-15 PROCEDURE — 99214 OFFICE O/P EST MOD 30 MIN: CPT | Performed by: FAMILY MEDICINE

## 2021-03-15 RX ORDER — ATENOLOL 25 MG/1
TABLET ORAL
Qty: 90 TAB | Refills: 1 | Status: SHIPPED | OUTPATIENT
Start: 2021-03-15 | End: 2021-10-19 | Stop reason: SDUPTHER

## 2021-03-15 RX ORDER — ATORVASTATIN CALCIUM 20 MG/1
TABLET, FILM COATED ORAL
Qty: 90 TAB | Refills: 1 | Status: SHIPPED | OUTPATIENT
Start: 2021-03-15 | End: 2021-10-19 | Stop reason: SDUPTHER

## 2021-03-15 RX ORDER — AMITRIPTYLINE HYDROCHLORIDE 25 MG/1
25 TABLET, FILM COATED ORAL
Qty: 90 TAB | Refills: 1 | Status: SHIPPED | OUTPATIENT
Start: 2021-03-15

## 2021-03-15 NOTE — PROGRESS NOTES
Chief Complaint   Patient presents with    Hypertension     4 months follow up       1. Have you been to the ER, urgent care clinic since your last visit? Hospitalized since your last visit? No    2. Have you seen or consulted any other health care providers outside of the [address] [place] [address] since your last visit? Include any pap smears or colon screening. No    Have you had the covid vaccine. No.. when    3 most recent PHQ Screens 3/15/2021   Little interest or pleasure in doing things Not at all   Feeling down, depressed, irritable, or hopeless Not at all   Total Score PHQ 2 0       Health Maintenance Due   Topic Date Due    COVID-19 Vaccine (1) Never done    Shingrix Vaccine Age 50> (1 of 2) Never done    GLAUCOMA SCREENING Q2Y  Never done     Patient will schedule an appointment for Glaucoma screening.

## 2021-03-15 NOTE — PATIENT INSTRUCTIONS
DASH Diet: Care Instructions Your Care Instructions The DASH diet is an eating plan that can help lower your blood pressure. DASH stands for Dietary Approaches to Stop Hypertension. Hypertension is high blood pressure. The DASH diet focuses on eating foods that are high in calcium, potassium, and magnesium. These nutrients can lower blood pressure. The foods that are highest in these nutrients are fruits, vegetables, low-fat dairy products, nuts, seeds, and legumes. But taking calcium, potassium, and magnesium supplements instead of eating foods that are high in those nutrients does not have the same effect. The DASH diet also includes whole grains, fish, and poultry. The DASH diet is one of several lifestyle changes your doctor may recommend to lower your high blood pressure. Your doctor may also want you to decrease the amount of sodium in your diet. Lowering sodium while following the DASH diet can lower blood pressure even further than just the DASH diet alone. Follow-up care is a key part of your treatment and safety. Be sure to make and go to all appointments, and call your doctor if you are having problems. It's also a good idea to know your test results and keep a list of the medicines you take. How can you care for yourself at home? Following the DASH diet · Eat 4 to 5 servings of fruit each day. A serving is 1 medium-sized piece of fruit, ½ cup chopped or canned fruit, 1/4 cup dried fruit, or 4 ounces (½ cup) of fruit juice. Choose fruit more often than fruit juice. · Eat 4 to 5 servings of vegetables each day. A serving is 1 cup of lettuce or raw leafy vegetables, ½ cup of chopped or cooked vegetables, or 4 ounces (½ cup) of vegetable juice. Choose vegetables more often than vegetable juice. · Get 2 to 3 servings of low-fat and fat-free dairy each day. A serving is 8 ounces of milk, 1 cup of yogurt, or 1 ½ ounces of cheese. · Eat 6 to 8 servings of grains each day.  A serving is 1 slice of bread, 1 ounce of dry cereal, or ½ cup of cooked rice, pasta, or cooked cereal. Try to choose whole-grain products as much as possible. · Limit lean meat, poultry, and fish to 2 servings each day. A serving is 3 ounces, about the size of a deck of cards. · Eat 4 to 5 servings of nuts, seeds, and legumes (cooked dried beans, lentils, and split peas) each week. A serving is 1/3 cup of nuts, 2 tablespoons of seeds, or ½ cup of cooked beans or peas. · Limit fats and oils to 2 to 3 servings each day. A serving is 1 teaspoon of vegetable oil or 2 tablespoons of salad dressing. · Limit sweets and added sugars to 5 servings or less a week. A serving is 1 tablespoon jelly or jam, ½ cup sorbet, or 1 cup of lemonade. · Eat less than 2,300 milligrams (mg) of sodium a day. If you limit your sodium to 1,500 mg a day, you can lower your blood pressure even more. Tips for success · Start small. Do not try to make dramatic changes to your diet all at once. You might feel that you are missing out on your favorite foods and then be more likely to not follow the plan. Make small changes, and stick with them. Once those changes become habit, add a few more changes. · Try some of the following: ? Make it a goal to eat a fruit or vegetable at every meal and at snacks. This will make it easy to get the recommended amount of fruits and vegetables each day. ? Try yogurt topped with fruit and nuts for a snack or healthy dessert. ? Add lettuce, tomato, cucumber, and onion to sandwiches. ? Combine a ready-made pizza crust with low-fat mozzarella cheese and lots of vegetable toppings. Try using tomatoes, squash, spinach, broccoli, carrots, cauliflower, and onions. ? Have a variety of cut-up vegetables with a low-fat dip as an appetizer instead of chips and dip. ? Sprinkle sunflower seeds or chopped almonds over salads. Or try adding chopped walnuts or almonds to cooked vegetables.  
? Try some vegetarian meals using beans and peas. Add garbanzo or kidney beans to salads. Make burritos and tacos with mashed knapp beans or black beans. Where can you learn more? Go to http://www.gray.com/ Enter S748 in the search box to learn more about \"DASH Diet: Care Instructions. \" Current as of: December 16, 2019               Content Version: 12.6 © 4914-4630 [employer]. Care instructions adapted under license by "[employer] (which disclaims liability or warranty for this information). If you have questions about a medical condition or this instruction, always ask your healthcare professional. [address] any warranty or liability for your use of this information.

## 2021-03-15 NOTE — PROGRESS NOTES
HISTORY OF PRESENT ILLNESS  Angelica Mckeon is a 73 y.o. male. Accompanied by son. Seen for follow up on chronic disease management. HPI  Cardiovascular Review  The patient has hypertension and hyperlipidemia.  He reports taking medications as instructed, no medication side effects noted, no TIA's, no chest pain on exertion, no dyspnea on exertion, no swelling of ankles, no orthostatic dizziness or lightheadedness, no orthopnea or paroxysmal nocturnal dyspnea, no palpitations, no intermittent claudication symptoms, no muscle aches or pain.  Diet and Lifestyle: not attempting to follow a low fat, low cholesterol diet, not attempting to follow a low sodium diet, sedentary, alcohol intake 3-4 beers/day.  Lab review: labs reviewed and discussed with patient.    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 He had MRI of head, Cervical spine and lumbar spine. 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 He was also referred for PT. He has negative RPR, normal homocystine and methylmalonic acid levels. He  had EMG and NCV which showed sensory neuropathy in both LE. Is on Vitamin b12 and Vitamin D supplement.     Uses cane when goes out side. In home, walks without support.     He is also on chronic PPI and taking Singulair and nasal spray during allergy season ( fall and spring)    Review of Systems   Constitutional: Negative for chills, fever and malaise/fatigue. HENT: Negative for congestion, ear pain, sore throat and tinnitus. Eyes: Negative for blurred vision, double vision, pain and discharge. Respiratory: Negative for cough, shortness of breath and wheezing. Cardiovascular: Negative for chest pain, palpitations and leg swelling. Gastrointestinal: Negative for abdominal pain, blood in stool, constipation, diarrhea, nausea and vomiting.    Genitourinary: Negative for dysuria, frequency, hematuria and urgency. Musculoskeletal: Negative for back pain, joint pain and myalgias. Skin: Negative for rash. Neurological: Negative for dizziness, tremors, seizures and headaches. Endo/Heme/Allergies: Negative for polydipsia. Does not bruise/bleed easily. Psychiatric/Behavioral: Negative for depression and substance abuse. The patient is not nervous/anxious. Physical Exam  Vitals signs and nursing note reviewed. Constitutional:       Appearance: He is well-developed. He is not diaphoretic. HENT:      Head: Normocephalic and atraumatic. Right Ear: External ear normal.      Nose: Nose normal.      Mouth/Throat:      Pharynx: No oropharyngeal exudate. Eyes:      General: No scleral icterus. Conjunctiva/sclera: Conjunctivae normal.      Pupils: Pupils are equal, round, and reactive to light. Neck:      Musculoskeletal: Normal range of motion and neck supple. Thyroid: No thyromegaly. Vascular: No JVD. Cardiovascular:      Rate and Rhythm: Normal rate and regular rhythm. Heart sounds: Normal heart sounds. No murmur. No friction rub. No gallop. Pulmonary:      Effort: Pulmonary effort is normal.      Breath sounds: Normal breath sounds. No wheezing. Abdominal:      General: Bowel sounds are normal. There is no distension. Palpations: Abdomen is soft. There is no mass. Musculoskeletal: Normal range of motion. General: No tenderness. Lymphadenopathy:      Cervical: No cervical adenopathy. Skin:     General: Skin is warm and dry. Findings: No rash. Neurological:      Mental Status: He is alert and oriented to person, place, and time. Cranial Nerves: No cranial nerve deficit. Coordination: Coordination normal.      Deep Tendon Reflexes: Reflexes are normal and symmetric. Reflex Scores:       Tricep reflexes are 2+ on the right side and 2+ on the left side.        Bicep reflexes are 2+ on the right side and 2+ on the left side. Brachioradialis reflexes are 2+ on the right side and 2+ on the left side. Comments: Decreased sensations on both lower extremities. Exaggerated knee reflex on left side. He is using cane to support when he walks. Gait has been significantly improved consistently. ASSESSMENT and PLAN  Diagnoses and all orders for this visit:    1. Hyperlipidemia with target LDL less than 130  -     LIPID PANEL; Future  -     METABOLIC PANEL, COMPREHENSIVE; Future  -     atorvastatin (LIPITOR) 20 mg tablet; TAKE 1 TABLET BY MOUTH EVERY DAY    2. Essential hypertension  -     METABOLIC PANEL, COMPREHENSIVE; Future  -     atenoloL (TENORMIN) 25 mg tablet; TAKE 1 TABLET BY MOUTH EVERY DAY    3. Late effect of stroke  -     LIPID PANEL; Future  -     amitriptyline (ELAVIL) 25 mg tablet; Take 1 Tab by mouth nightly. 4. Sensory ataxia  -     VITAMIN B12; Future    5. Vitamin D deficiency  -     VITAMIN D, 25 HYDROXY; Future    6. Glaucoma of both eyes, unspecified glaucoma type  -     REFERRAL TO OPHTHALMOLOGY      Discussed lifestyle issues and health guidance given  Patient was given an after visit summary which includes diagnoses, vital signs, current medications, instructions and references & authorized prescriptions . Results of labs will be conveyed to patient, once available. Pt verbalized instructions I provided and expressed understanding of discussion that was held today. Follow-up and Dispositions    · Return in about 4 months (around 7/15/2021) for follow up, fasting.

## 2021-03-16 NOTE — PROGRESS NOTES
Please inform son,  Kidney and liver function, vitamin D and vitamin B12 level are very reassuring. Cholesterol results have improved from last time, to continue on current medications.   Thanks

## 2021-03-16 NOTE — PROGRESS NOTES
Called patient's son. Name and  verified. Informed that Kidney and liver function, vitamin D and vitamin B12 level are very reassuring. Cholesterol results have improved from last time,Recommended to continue on current medications. He verbalized understanding.

## 2021-03-17 ENCOUNTER — IMMUNIZATION (OUTPATIENT)
Dept: INTERNAL MEDICINE CLINIC | Age: 71
End: 2021-03-17
Payer: MEDICAID

## 2021-03-17 DIAGNOSIS — Z23 ENCOUNTER FOR IMMUNIZATION: Primary | ICD-10-CM

## 2021-03-17 PROCEDURE — 91300 COVID-19, MRNA, LNP-S, PF, 30MCG/0.3ML DOSE(PFIZER): CPT | Performed by: FAMILY MEDICINE

## 2021-03-17 PROCEDURE — 0001A COVID-19, MRNA, LNP-S, PF, 30MCG/0.3ML DOSE(PFIZER): CPT | Performed by: FAMILY MEDICINE

## 2021-03-18 ENCOUNTER — TELEPHONE (OUTPATIENT)
Dept: FAMILY MEDICINE CLINIC | Age: 71
End: 2021-03-18

## 2021-03-18 NOTE — TELEPHONE ENCOUNTER
----- Message from Natalie Fisher sent at 3/17/2021  2:51 PM EDT -----  Regarding: Genesis Bailey MD/telephone  General Message/Vendor Calls    Caller's first and last name: Eleonora Gomez from [facility]       Reason for call: Requested       Callback required yes/no and why:      Best contact number(s): [phone]      Details to clarify the request: Ms. James Correa stated that the member would be having a care meeting and she would like to know if Dr. David Renee would like to attend the meeting and if there's anything she would like to discuss with them.        April [address]

## 2021-03-23 DIAGNOSIS — Z91.09 ENVIRONMENTAL ALLERGIES: ICD-10-CM

## 2021-03-23 RX ORDER — CROMOLYN SODIUM 40 MG/ML
SOLUTION/ DROPS OPHTHALMIC
Qty: 10 ML | Refills: 1 | Status: SHIPPED | OUTPATIENT
Start: 2021-03-23 | End: 2021-04-01

## 2021-03-26 ENCOUNTER — TELEPHONE (OUTPATIENT)
Dept: FAMILY MEDICINE CLINIC | Age: 71
End: 2021-03-26

## 2021-03-26 DIAGNOSIS — Z91.09 ENVIRONMENTAL ALLERGIES: Primary | ICD-10-CM

## 2021-03-26 RX ORDER — EPINASTINE HYDROCHLORIDE 0.5 MG/ML
1 SOLUTION/ DROPS OPHTHALMIC 2 TIMES DAILY
Qty: 5 ML | Refills: 1 | Status: SHIPPED | OUTPATIENT
Start: 2021-03-26 | End: 2021-04-01

## 2021-03-26 NOTE — TELEPHONE ENCOUNTER
Kendall Leonardo 4% opth soln is backordered. Is there another drop that can be prescribed?   Thanks, Jennifer Moy

## 2021-04-01 ENCOUNTER — TELEPHONE (OUTPATIENT)
Dept: FAMILY MEDICINE CLINIC | Age: 71
End: 2021-04-01

## 2021-04-01 DIAGNOSIS — Z91.09 ENVIRONMENTAL ALLERGIES: Primary | ICD-10-CM

## 2021-04-01 RX ORDER — KETOTIFEN FUMARATE 0.35 MG/ML
1 SOLUTION/ DROPS OPHTHALMIC 2 TIMES DAILY
Qty: 5 ML | Refills: 1 | Status: SHIPPED | OUTPATIENT
Start: 2021-04-01 | End: 2021-04-11

## 2021-04-01 NOTE — TELEPHONE ENCOUNTER
Chief Complaint   Patient presents with    Prior Auth     Epinastine HCl 0.05% solution:  DENIED:  Requires unsuccessful trial of two preferred medications for the eye, called antihistamines, such as Alaway OTC, ketotifen fumarate, olopatadine ( generic Patanol and Pataday ) Pazeo or Zaditor OTC drops.

## 2021-04-07 ENCOUNTER — IMMUNIZATION (OUTPATIENT)
Dept: INTERNAL MEDICINE CLINIC | Age: 71
End: 2021-04-07
Payer: MEDICAID

## 2021-04-07 DIAGNOSIS — Z23 ENCOUNTER FOR IMMUNIZATION: Primary | ICD-10-CM

## 2021-04-07 PROCEDURE — 91300 COVID-19, MRNA, LNP-S, PF, 30MCG/0.3ML DOSE(PFIZER): CPT | Performed by: FAMILY MEDICINE

## 2021-04-07 PROCEDURE — 0002A COVID-19, MRNA, LNP-S, PF, 30MCG/0.3ML DOSE(PFIZER): CPT | Performed by: FAMILY MEDICINE

## 2021-04-20 ENCOUNTER — TRANSCRIBE ORDER (OUTPATIENT)
Dept: INTERNAL MEDICINE CLINIC | Age: 71
End: 2021-04-20

## 2021-04-20 DIAGNOSIS — K21.9 GASTROESOPHAGEAL REFLUX DISEASE WITHOUT ESOPHAGITIS: ICD-10-CM

## 2021-04-20 RX ORDER — CHOLECALCIFEROL (VITAMIN D3) 125 MCG
CAPSULE ORAL
Qty: 90 CAP | Refills: 1 | Status: CANCELLED | OUTPATIENT
Start: 2021-04-20

## 2021-04-20 NOTE — TELEPHONE ENCOUNTER
----- Message from Jeri Samuel sent at 4/20/2021  4:08 PM EDT -----  Regarding: Bala Talbot MD  Medication Refill    Caller (if not patient): Alicia Valeriorafaelnaomi eubanks-Pt's son      Relationship of caller (if not patient):      Best contact number(s):  [phone]        Name of medication and dosage if known: omeprazole (PRILOSEC) 20 mg capsule; cholecalciferol (VITAMIN D3) (5000 Units /125 mcg) capsule; \"asthma medication\"      Is patient out of this medication (yes/no): Yes      Pharmacy name: [facility] [place], [address]  [facility] listed in chart? (yes/no):  Pharmacy phone number:      Details to clarify the request:      Jeri Samuel

## 2021-04-21 RX ORDER — OMEPRAZOLE 20 MG/1
20 CAPSULE, DELAYED RELEASE ORAL
Qty: 90 CAP | Refills: 1 | Status: SHIPPED | OUTPATIENT
Start: 2021-04-21 | End: 2021-07-03

## 2021-04-21 NOTE — TELEPHONE ENCOUNTER
Duplicate request:   - 49/16/5531 Singulair 10 mg #30 with 5 refills,  - 02/17/2021 Vitamin D3 #90 with 1 refill,  Both was sent to [address] [facility] [address] visit 03/15/2021 MD Berta Wilkins   Next appointment 07/15/2021 MD Berta Wilkins   Previous refill encounter(s)   10/15/2020 Prilosec #90 with 1 refill    Requested Prescriptions     Pending Prescriptions Disp Refills    omeprazole (PRILOSEC) 20 mg capsule 90 Cap 1     Sig: Take 1 Cap by mouth Daily (before breakfast).     cholecalciferol (VITAMIN D3) (5000 Units /125 mcg) capsule 90 Cap 1

## 2021-07-03 ENCOUNTER — HOSPITAL ENCOUNTER (EMERGENCY)
Age: 71
Discharge: HOME | End: 2021-07-03
Attending: STUDENT IN AN ORGANIZED HEALTH CARE EDUCATION/TRAINING PROGRAM
Payer: MEDICAID

## 2021-07-03 ENCOUNTER — APPOINTMENT (OUTPATIENT)
Dept: GENERAL RADIOLOGY | Age: 71
End: 2021-07-03
Attending: PHYSICIAN ASSISTANT
Payer: MEDICAID

## 2021-07-03 VITALS
HEART RATE: 64 BPM | TEMPERATURE: 97.6 F | OXYGEN SATURATION: 100 % | SYSTOLIC BLOOD PRESSURE: 134 MMHG | RESPIRATION RATE: 18 BRPM | WEIGHT: 133 LBS | HEIGHT: 63 IN | DIASTOLIC BLOOD PRESSURE: 81 MMHG | BODY MASS INDEX: 23.57 KG/M2

## 2021-07-03 DIAGNOSIS — R07.9 CHEST PAIN, UNSPECIFIED TYPE: Primary | ICD-10-CM

## 2021-07-03 DIAGNOSIS — R06.02 SOB (SHORTNESS OF BREATH): ICD-10-CM

## 2021-07-03 LAB
ALBUMIN SERPL-MCNC: 4 G/DL (ref 3.5–5)
ALBUMIN/GLOB SERPL: 1 {RATIO} (ref 1.1–2.2)
ALP SERPL-CCNC: 111 U/L (ref 45–117)
ALT SERPL-CCNC: 51 U/L (ref 12–78)
ANION GAP SERPL CALC-SCNC: 3 MMOL/L (ref 5–15)
AST SERPL-CCNC: 26 U/L (ref 15–37)
ATRIAL RATE: 107 BPM
ATRIAL RATE: 57 BPM
BASOPHILS # BLD: 0 K/UL (ref 0–0.1)
BASOPHILS NFR BLD: 1 % (ref 0–1)
BILIRUB SERPL-MCNC: 0.4 MG/DL (ref 0.2–1)
BUN SERPL-MCNC: 15 MG/DL (ref 6–20)
BUN/CREAT SERPL: 16 (ref 12–20)
CALCIUM SERPL-MCNC: 9.5 MG/DL (ref 8.5–10.1)
CALCULATED P AXIS, ECG09: 30 DEGREES
CALCULATED P AXIS, ECG09: 48 DEGREES
CALCULATED R AXIS, ECG10: -7 DEGREES
CALCULATED R AXIS, ECG10: 32 DEGREES
CALCULATED T AXIS, ECG11: 31 DEGREES
CALCULATED T AXIS, ECG11: 93 DEGREES
CHLORIDE SERPL-SCNC: 107 MMOL/L (ref 97–108)
CO2 SERPL-SCNC: 28 MMOL/L (ref 21–32)
COMMENT, HOLDF: NORMAL
CREAT SERPL-MCNC: 0.91 MG/DL (ref 0.7–1.3)
D DIMER PPP FEU-MCNC: 0.35 MG/L FEU (ref 0–0.65)
DIAGNOSIS, 93000: NORMAL
DIAGNOSIS, 93000: NORMAL
DIFFERENTIAL METHOD BLD: NORMAL
EOSINOPHIL # BLD: 0.2 K/UL (ref 0–0.4)
EOSINOPHIL NFR BLD: 4 % (ref 0–7)
ERYTHROCYTE [DISTWIDTH] IN BLOOD BY AUTOMATED COUNT: 13.3 % (ref 11.5–14.5)
GLOBULIN SER CALC-MCNC: 4.1 G/DL (ref 2–4)
GLUCOSE SERPL-MCNC: 101 MG/DL (ref 65–100)
HCT VFR BLD AUTO: 42.5 % (ref 36.6–50.3)
HGB BLD-MCNC: 14.4 G/DL (ref 12.1–17)
IMM GRANULOCYTES # BLD AUTO: 0 K/UL (ref 0–0.04)
IMM GRANULOCYTES NFR BLD AUTO: 0 % (ref 0–0.5)
LYMPHOCYTES # BLD: 1.7 K/UL (ref 0.8–3.5)
LYMPHOCYTES NFR BLD: 29 % (ref 12–49)
MCH RBC QN AUTO: 27.3 PG (ref 26–34)
MCHC RBC AUTO-ENTMCNC: 33.9 G/DL (ref 30–36.5)
MCV RBC AUTO: 80.6 FL (ref 80–99)
MONOCYTES # BLD: 0.5 K/UL (ref 0–1)
MONOCYTES NFR BLD: 9 % (ref 5–13)
NEUTS SEG # BLD: 3.4 K/UL (ref 1.8–8)
NEUTS SEG NFR BLD: 57 % (ref 32–75)
NRBC # BLD: 0 K/UL (ref 0–0.01)
NRBC BLD-RTO: 0 PER 100 WBC
P-R INTERVAL, ECG05: 122 MS
P-R INTERVAL, ECG05: 148 MS
PLATELET # BLD AUTO: 235 K/UL (ref 150–400)
PMV BLD AUTO: 11 FL (ref 8.9–12.9)
POTASSIUM SERPL-SCNC: 4.3 MMOL/L (ref 3.5–5.1)
PROT SERPL-MCNC: 8.1 G/DL (ref 6.4–8.2)
Q-T INTERVAL, ECG07: 420 MS
Q-T INTERVAL, ECG07: 422 MS
QRS DURATION, ECG06: 78 MS
QRS DURATION, ECG06: 86 MS
QTC CALCULATION (BEZET), ECG08: 408 MS
QTC CALCULATION (BEZET), ECG08: 563 MS
RBC # BLD AUTO: 5.27 M/UL (ref 4.1–5.7)
SAMPLES BEING HELD,HOLD: NORMAL
SODIUM SERPL-SCNC: 138 MMOL/L (ref 136–145)
TROPONIN I SERPL-MCNC: <0.05 NG/ML
TROPONIN I SERPL-MCNC: <0.05 NG/ML
VENTRICULAR RATE, ECG03: 107 BPM
VENTRICULAR RATE, ECG03: 57 BPM
WBC # BLD AUTO: 5.9 K/UL (ref 4.1–11.1)

## 2021-07-03 PROCEDURE — 74011250637 HC RX REV CODE- 250/637: Performed by: PHYSICIAN ASSISTANT

## 2021-07-03 PROCEDURE — 85025 COMPLETE CBC W/AUTO DIFF WBC: CPT

## 2021-07-03 PROCEDURE — 36415 COLL VENOUS BLD VENIPUNCTURE: CPT

## 2021-07-03 PROCEDURE — 99282 EMERGENCY DEPT VISIT SF MDM: CPT

## 2021-07-03 PROCEDURE — 74011000250 HC RX REV CODE- 250: Performed by: PHYSICIAN ASSISTANT

## 2021-07-03 PROCEDURE — 71046 X-RAY EXAM CHEST 2 VIEWS: CPT

## 2021-07-03 PROCEDURE — 85379 FIBRIN DEGRADATION QUANT: CPT

## 2021-07-03 PROCEDURE — 80053 COMPREHEN METABOLIC PANEL: CPT

## 2021-07-03 PROCEDURE — 84484 ASSAY OF TROPONIN QUANT: CPT

## 2021-07-03 PROCEDURE — 93005 ELECTROCARDIOGRAM TRACING: CPT

## 2021-07-03 RX ORDER — ALBUTEROL SULFATE 90 UG/1
1 AEROSOL, METERED RESPIRATORY (INHALATION)
Qty: 1 INHALER | Refills: 0 | Status: SHIPPED | OUTPATIENT
Start: 2021-07-03 | End: 2021-10-19 | Stop reason: SDUPTHER

## 2021-07-03 RX ORDER — FAMOTIDINE 20 MG/1
20 TABLET, FILM COATED ORAL 2 TIMES DAILY
Qty: 28 TABLET | Refills: 0 | Status: SHIPPED | OUTPATIENT
Start: 2021-07-03 | End: 2021-07-17

## 2021-07-03 RX ADMIN — ALUMINUM HYDROXIDE, MAGNESIUM HYDROXIDE, AND SIMETHICONE 40 ML: 200; 200; 20 SUSPENSION ORAL at 14:10

## 2021-07-03 NOTE — DISCHARGE INSTRUCTIONS
Please do not take Omeprazole and Pepcid at the same time, please take one or the other. Please follow up with Cardiology.

## 2021-07-03 NOTE — ED TRIAGE NOTES
Pt ambulatory to ED accompanied by family with c/o sob and wheezing onset 4 weeks ago progressively getting worse over the last 4 days. Denies fever.

## 2021-07-03 NOTE — ED PROVIDER NOTES
Reyes Kumar is a 73 y.o. male with PMhx of HTN, stroke, hypercholesterolemia, asthma, reflux who presents to ED with cc of intermittent shortness of breath, wheezing and mid-sternal/epuigastric pain x approx 4 weeks, states for the past 3 days it has seemed worse. States he doesn't take any medications for reflux or asthma. Has been treating with tylenol. States he doesn't associate his pain with any particular events such as eating food or movement. Endorses a cough, denies fever or congestion. Denies having CP or abd pain at this time. Pt denies additional symptoms of F/C, cough, congestion, CP, SOB, urinary or fecal changes, vomiting, syncope, visual changes, wound. PMHx: Reviewed, as below. PSHx: Reviewed, as below. PCP: Gianna Jordan MD    There are no other complaints verbalized at this time. [facility] : [id number]                 Past Medical History:   Diagnosis Date    Acid indigestion     Asthma     B12 deficiency 08/08/13    level 0    Hypercholesterolemia     Hypertension     Stroke [facility])        Past Surgical History:   Procedure Laterality Date    COLONOSCOPY N/A 1/6/2021    COLONOSCOPY,EGD performed by Haven Lopez MD at [place] [id number] reviewed. No pertinent family history.     Social History     Socioeconomic History    Marital status:      Spouse name: Not on file    Number of children: Not on file    Years of education: Not on file    Highest education level: Not on file   Occupational History    Not on file   Tobacco Use    Smoking status: Never Smoker    Smokeless tobacco: Current User     Types: Snuff   Vaping Use    Vaping Use: Never used   Substance and Sexual Activity    Alcohol use: No     Alcohol/week: 0.0 standard drinks     Comment: Social.  Patient currently is not drinking for 4 months     Drug use: No    Sexual activity: Never   Other Topics Concern    Not on file   Social History Narrative    Not on file Social Determinants of Health     Financial Resource Strain:     Difficulty of Paying Living Expenses:    Food Insecurity:     Worried About Running Out of Food in the Last Year:     [address] in the Last Year:    Transportation Needs:     Lack of Transportation (Medical):  Lack of Transportation (Non-Medical):    Physical Activity:     Days of Exercise per Week:     Minutes of Exercise per Session:    Stress:     Feeling of Stress :    Social Connections:     Frequency of Communication with Friends and Family:     Frequency of Social Gatherings with Friends and Family:     Attends [employer] Services:     Active Member of Clubs or Organizations:     Attends Club or Organization Meetings:     Marital Status:    Intimate Partner Violence:     Fear of Current or Ex-Partner:     Emotionally Abused:     Physically Abused:     Sexually Abused: ALLERGIES: Patient has no known allergies. Review of Systems   Constitutional: Negative for chills and fever. HENT: Negative for congestion. Eyes: Negative for visual disturbance. Respiratory: Positive for shortness of breath. Negative for cough. Cardiovascular: Positive for chest pain. Gastrointestinal: Positive for abdominal pain. Negative for constipation, diarrhea, nausea and vomiting. Genitourinary: Negative for dysuria and frequency. Musculoskeletal: Negative for neck stiffness. Skin: Negative for wound. Neurological: Negative for syncope. All other systems reviewed and are negative. Vitals:    07/03/21 1231 07/03/21 1752   BP: 128/72 134/81   Pulse: 65 64   Resp: 24 18   Temp: 97.2 °F (36.2 °C) 97.6 °F (36.4 °C)   SpO2: 98% 100%   Weight: 60.3 kg (133 lb)    Height: 5' 3\" (1.6 m)             Physical Exam  Vitals and nursing note reviewed. Constitutional:       Appearance: He is not diaphoretic. HENT:      Head: Normocephalic.       Right Ear: External ear normal.      Left Ear: External ear normal. Eyes:      General: No scleral icterus. Cardiovascular:      Rate and Rhythm: Normal rate and regular rhythm. Heart sounds: Normal heart sounds. No murmur heard. Pulmonary:      Effort: Pulmonary effort is normal. No respiratory distress. Breath sounds: Normal breath sounds. No stridor. No wheezing, rhonchi or rales. Chest:      Chest wall: No tenderness. Abdominal:      General: Bowel sounds are normal. There is no distension. Palpations: Abdomen is soft. There is no mass. Tenderness: There is no abdominal tenderness. There is no guarding or rebound. Hernia: No hernia is present. Musculoskeletal:         General: No swelling or deformity. Cervical back: Normal range of motion. Skin:     General: Skin is warm and dry. Neurological:      Mental Status: He is alert and oriented to person, place, and time. Mental status is at baseline. Gait: Gait normal.          MDM  Number of Diagnoses or Management Options     Amount and/or Complexity of Data Reviewed  Clinical lab tests: ordered and reviewed  Tests in the radiology section of CPT®: ordered and reviewed  Tests in the medicine section of CPT®: ordered and reviewed  Discuss the patient with other providers: yes (Dr Goyo Herrera, ED attending)           Procedures        CONSULT NOTE:     4:29 PM  Carolyn Nye PA-C spoke with Dr Ayesha Kaufman,   Specialty: Cardiology  Discussed pt's hx, disposition, and available diagnostic and imaging results. Reviewed care plans. Agrees with plan for follow up if repeat troponin negative.         [facility] : [id number] used at time of discharge    VITAL SIGNS:  Vitals:    07/03/21 1231 07/03/21 1752   BP: 128/72 134/81   Pulse: 65 64   Resp: 24 18   Temp: 97.2 °F (36.2 °C) 97.6 °F (36.4 °C)   SpO2: 98% 100%   Weight: 60.3 kg (133 lb)    Height: 5' 3\" (1.6 m)          LABS:  Recent Results (from the past 24 hour(s))   TROPONIN I    Collection Time: 07/03/21  4:45 PM   Result Value Ref Range Troponin-I, Qt. <0.05 <0.05 ng/mL         IMAGING:  XR CHEST PA LAT   Final Result   No acute cardiopulmonary process            Medications During Visit:  Medications   mylanta/viscous lidocaine (GI COCKTAIL) (40 mL Oral Given 7/3/21 1410)         DECISION MAKING:    Juan Jose Zhong is a 73 y.o. male who comes in as above. Presents afebrile and hemodynamically stable. Complaint of intermittent shortness of breath, wheezing and midsternal/epigastric pain for the past 4 weeks which has worsened over the past 3 days. Intermittent in nature and he is unable to discern any particular trigger. Physical exam overall encouraging without tenderness to palpation of chest or audible adventitious lung sounds. CBC, CMP, troponin, D-dimer, EKG, chest x-ray obtained without acute etiology. Repeat troponin without acute elevation. Discussed case with ED attending and spoke with cardiology. Cardiology agrees with care plan for follow-up. Will treat with Pepcid course at home and he has been discharged with albuterol inhaler given concern for wheezing with history of asthma to use as needed. We have discussed close return precautions as well as follow up recommendations with cardiology and with his primary care. Opportunity for questions presented. Pt verbalizes their understanding and agreement with care plan. IMPRESSION:  1. Chest pain, unspecified type    2. SOB (shortness of breath)        DISPOSITION:  Discharged      Discharge Medication List as of 7/3/2021  6:12 PM      START taking these medications    Details   albuterol (PROVENTIL HFA, VENTOLIN HFA, PROAIR HFA) 90 mcg/actuation inhaler Take 1 Puff by inhalation every six (6) hours as needed for Wheezing., Normal, Disp-1 Inhaler, R-0      famotidine (Pepcid) 20 mg tablet Take 1 Tablet by mouth two (2) times a day for 14 days. , Normal, Disp-28 Tablet, R-0         CONTINUE these medications which have NOT CHANGED    Details   atorvastatin (LIPITOR) 20 mg tablet TAKE 1 TABLET BY MOUTH EVERY DAY, Normal, Disp-90 Tab, R-1      amitriptyline (ELAVIL) 25 mg tablet Take 1 Tab by mouth nightly., Normal, Disp-90 Tab, R-1      atenoloL (TENORMIN) 25 mg tablet TAKE 1 TABLET BY MOUTH EVERY DAY, Normal, Disp-90 Tab, R-1      cholecalciferol (VITAMIN D3) (5000 Units /125 mcg) capsule TAKE 1 CAPSULE BY MOUTH EVERY DAY, Normal, Disp-90 Cap, R-1      montelukast (SINGULAIR) 10 mg tablet TAKE 1 TABLET BY MOUTH EVERY DAY, Normal, Disp-30 Tab, R-5      cyanocobalamin (VITAMIN B12) 500 mcg tablet TAKE 1 TAB BY MOUTH DAILY., Normal, Disp-100 Tab, R-0         STOP taking these medications       omeprazole (PRILOSEC) 20 mg capsule Comments:   Reason for Stopping: Follow-up Information     Follow up With Specialties Details Why Contact Info    Elgin Quevedo MD Family Medicine Schedule an appointment as soon as possible for a visit   [id number] [address], [facility] Emergency Medicine Go to  As needed Sharon Swan [address] [facility]    Alessandra Louis MD Cardiology Schedule an appointment as soon as possible for a visit   [address] [address] [facility]  [phone]              The patient is asked to follow-up with their primary care provider and any other physicians as above in the next several days. They are to call tomorrow for an appointment. We have discussed strict return precautions and the patient is asked to return promptly for any increased concerns or worsening of symptoms and for return precautions regarding their symptoms today. They can return to this emergency department or any other emergency department. I have discussed with them results as above and presented opportunity for questions. They verbalize their understanding of the aboveand agreement with care plan. Please note that this dictation was completed with [employer] voice recognition software.   Quite often unanticipated grammatical, syntax, homophones, and other interpretive errors are inadvertently transcribed by the computer software. Please disregard these errors. Please excuse any errors that have escaped final proofreading.

## 2021-07-15 ENCOUNTER — OFFICE VISIT (OUTPATIENT)
Dept: FAMILY MEDICINE CLINIC | Age: 71
End: 2021-07-15
Payer: MEDICAID

## 2021-07-15 VITALS
RESPIRATION RATE: 14 BRPM | BODY MASS INDEX: 23.67 KG/M2 | HEART RATE: 73 BPM | TEMPERATURE: 96.9 F | DIASTOLIC BLOOD PRESSURE: 88 MMHG | OXYGEN SATURATION: 99 % | SYSTOLIC BLOOD PRESSURE: 139 MMHG | HEIGHT: 63 IN | WEIGHT: 133.6 LBS

## 2021-07-15 DIAGNOSIS — E78.5 HYPERLIPIDEMIA WITH TARGET LDL LESS THAN 130: Primary | ICD-10-CM

## 2021-07-15 DIAGNOSIS — E55.9 VITAMIN D DEFICIENCY: ICD-10-CM

## 2021-07-15 DIAGNOSIS — R27.8 SENSORY ATAXIA: ICD-10-CM

## 2021-07-15 DIAGNOSIS — R20.0 NUMBNESS AND TINGLING OF BOTH LEGS: ICD-10-CM

## 2021-07-15 DIAGNOSIS — Z09 HOSPITAL DISCHARGE FOLLOW-UP: ICD-10-CM

## 2021-07-15 DIAGNOSIS — E53.8 VITAMIN B12 DEFICIENCY: ICD-10-CM

## 2021-07-15 DIAGNOSIS — R20.2 NUMBNESS AND TINGLING OF BOTH LEGS: ICD-10-CM

## 2021-07-15 DIAGNOSIS — I10 ESSENTIAL HYPERTENSION: ICD-10-CM

## 2021-07-15 DIAGNOSIS — R26.9 GAIT DISTURBANCE: ICD-10-CM

## 2021-07-15 PROBLEM — G93.40 ACUTE ENCEPHALOPATHY: Status: RESOLVED | Noted: 2019-05-01 | Resolved: 2021-07-15

## 2021-07-15 LAB
25(OH)D3 SERPL-MCNC: 39.2 NG/ML (ref 30–100)
ALBUMIN SERPL-MCNC: 4.3 G/DL (ref 3.5–5)
ALBUMIN/GLOB SERPL: 1.1 {RATIO} (ref 1.1–2.2)
ALP SERPL-CCNC: 115 U/L (ref 45–117)
ALT SERPL-CCNC: 41 U/L (ref 12–78)
ANION GAP SERPL CALC-SCNC: 4 MMOL/L (ref 5–15)
AST SERPL-CCNC: 23 U/L (ref 15–37)
BILIRUB SERPL-MCNC: 0.6 MG/DL (ref 0.2–1)
BUN SERPL-MCNC: 10 MG/DL (ref 6–20)
BUN/CREAT SERPL: 10 (ref 12–20)
CALCIUM SERPL-MCNC: 9.4 MG/DL (ref 8.5–10.1)
CHLORIDE SERPL-SCNC: 105 MMOL/L (ref 97–108)
CHOLEST SERPL-MCNC: 128 MG/DL
CO2 SERPL-SCNC: 29 MMOL/L (ref 21–32)
CREAT SERPL-MCNC: 1.04 MG/DL (ref 0.7–1.3)
GLOBULIN SER CALC-MCNC: 3.9 G/DL (ref 2–4)
GLUCOSE SERPL-MCNC: 99 MG/DL (ref 65–100)
HDLC SERPL-MCNC: 37 MG/DL
HDLC SERPL: 3.5 {RATIO} (ref 0–5)
LDLC SERPL CALC-MCNC: 45.4 MG/DL (ref 0–100)
POTASSIUM SERPL-SCNC: 4.7 MMOL/L (ref 3.5–5.1)
PROT SERPL-MCNC: 8.2 G/DL (ref 6.4–8.2)
SODIUM SERPL-SCNC: 138 MMOL/L (ref 136–145)
TRIGL SERPL-MCNC: 228 MG/DL (ref ?–150)
VIT B12 SERPL-MCNC: 620 PG/ML (ref 193–986)
VLDLC SERPL CALC-MCNC: 45.6 MG/DL

## 2021-07-15 PROCEDURE — 99214 OFFICE O/P EST MOD 30 MIN: CPT | Performed by: FAMILY MEDICINE

## 2021-07-15 NOTE — PROGRESS NOTES
HISTORY OF PRESENT ILLNESS  Angelica Saenz is a 73 y.o. male. Patient was seen today for follow-up on dyslipidemia, hypertension, sensory ataxia and also for follow-up on recent ER visit for shortness of breath. He is accompanied by daughter-in-law, who is caregiver at home. She wanted to get the paper to justify to get reimbursement for taking care of Mr. Rosales. She is also requesting for wheelchair, bedside commode, shower chair, railings for bed. HPI  Cardiovascular Review  The patient has hypertension and hyperlipidemia.  He reports taking medications as instructed, no medication side effects noted, no TIA's, no chest pain on exertion, no dyspnea on exertion, no swelling of ankles, no orthostatic dizziness or lightheadedness, no orthopnea or paroxysmal nocturnal dyspnea, no palpitations, no intermittent claudication symptoms, no muscle aches or pain.  Diet and Lifestyle: not attempting to follow a low fat, low cholesterol diet, not attempting to follow a low sodium diet, sedentary, alcohol intake 3-4 beers/day.  Lab review: labs reviewed and discussed with patient.    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 He had MRI of head, Cervical spine and lumbar spine. 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 He was also referred for PT. He has negative RPR, normal homocystine and methylmalonic acid levels. He  had EMG and NCV which showed sensory neuropathy in both LE. His symptoms are stable on amitriptyline 25 mg at night. Is on Vitamin b12 and Vitamin D supplement.     Uses cane when goes out side. In home, walks without support.     He is also on chronic PPI and taking Singulair and nasal spray during allergy season ( fall and spring)    Hospital Follow Up  Amena Dietz is seen for follow up from recent ED visit to [facility] on 7/3/21. .  We reviewed the active problem list, medication list, allergies, notes from last encounter, lab results, imaging, ER provider summary. He presented with chest tightness and shortness of breath. In ER all the work-up including cardiac enzyme x2, EKG x2, x-ray chest, CBC and CMP where normal.  His vitals were also very reassuring. He was diagnosed with asthma exacerbation and was prescribed albuterol inhaler and famotidine. He has appointment scheduled with cardiology as outpatient. Mikie Thomas He is taking his famotidine as directed & without any side effects. He reports symptoms are resolved. Review of Systems   Constitutional: Negative for chills, fever and malaise/fatigue. HENT: Negative for congestion, ear pain, sore throat and tinnitus. Eyes: Negative for blurred vision, double vision, pain and discharge. Respiratory: Negative for cough, shortness of breath and wheezing. Cardiovascular: Negative for chest pain, palpitations and leg swelling. Gastrointestinal: Negative for abdominal pain, blood in stool, constipation, diarrhea, nausea and vomiting. Genitourinary: Negative for dysuria, frequency, hematuria and urgency. Musculoskeletal: Negative for back pain, joint pain and myalgias. Skin: Negative for rash. Neurological: Negative for dizziness, tremors, seizures and headaches. Endo/Heme/Allergies: Negative for polydipsia. Does not bruise/bleed easily. Psychiatric/Behavioral: Negative for depression and substance abuse. The patient is not nervous/anxious. Physical Exam  Vitals and nursing note reviewed. Constitutional:       Appearance: He is well-developed. He is not diaphoretic. HENT:      Head: Normocephalic and atraumatic. Right Ear: External ear normal.      Nose: Nose normal.      Mouth/Throat:      Pharynx: No oropharyngeal exudate. Eyes:      General: No scleral icterus. Conjunctiva/sclera: Conjunctivae normal.      Pupils: Pupils are equal, round, and reactive to light. Neck:      Thyroid: No thyromegaly. Vascular: No JVD. Cardiovascular:      Rate and Rhythm: Normal rate and regular rhythm. Heart sounds: Normal heart sounds. No murmur heard. No friction rub. No gallop. Pulmonary:      Effort: Pulmonary effort is normal.      Breath sounds: Normal breath sounds. No wheezing. Abdominal:      General: Bowel sounds are normal. There is no distension. Palpations: Abdomen is soft. There is no mass. Musculoskeletal:         General: No tenderness. Normal range of motion. Cervical back: Normal range of motion and neck supple. Lymphadenopathy:      Cervical: No cervical adenopathy. Skin:     General: Skin is warm and dry. Findings: No rash. Neurological:      Mental Status: He is alert and oriented to person, place, and time. Cranial Nerves: No cranial nerve deficit. Coordination: Coordination normal.      Deep Tendon Reflexes: Reflexes are normal and symmetric. Reflex Scores:       Tricep reflexes are 2+ on the right side and 2+ on the left side. Bicep reflexes are 2+ on the right side and 2+ on the left side. Brachioradialis reflexes are 2+ on the right side and 2+ on the left side. Comments: Decreased sensations on both lower extremities. Exaggerated knee reflex on left side. He is using cane to support when he walks. Gait has been significantly improved consistently. ASSESSMENT and PLAN  Diagnoses and all orders for this visit:    1. Hyperlipidemia with target LDL less than 130  -     LIPID PANEL; Future  -     METABOLIC PANEL, COMPREHENSIVE; Future    2. Essential hypertension  -     METABOLIC PANEL, COMPREHENSIVE; Future    3. Vitamin B12 deficiency  -     VITAMIN B12; Future    4. Numbness and tingling of both legs  -     Shower Chair XX grace; To balance while taking showers  -     Walker misc; Use when you walk    5. Vitamin D deficiency  -     VITAMIN D, 25 HYDROXY; Future    6.  Sensory ataxia  - Walker misc; Use when you walk  -     Shower Chair XX grace; To balance while taking showers  -     Walker misc; Use when you walk    7. Gait disturbance  -     Walker misc; Use when you walk  -     Shower Chair XX grace; To balance while taking showers  -     Walker misc; Use when you walk    8. Hospital discharge follow-up      Discussed lifestyle issues and health guidance given  Patient was given an after visit summary which includes diagnoses, vital signs, current medications, instructions and references & authorized prescriptions . Results of labs will be conveyed to patient, once available. Pt verbalized instructions I provided and expressed understanding of discussion that was held today. Follow-up and Dispositions    · Return in about 4 months (around 11/15/2021) for follow up, fasting.

## 2021-07-15 NOTE — PROGRESS NOTES
Chief Complaint   Patient presents with    Hypertension     4 months follow up       1. Have you been to the ER, urgent care clinic since your last visit? Hospitalized since your last visit? Yes When: 7/3/21 Where: [facility]  Reason for visit: shortness of breath, chest pain    2. Have you seen or consulted any other health care providers outside of the [address] [place] [address] [place] since your last visit? Include any pap smears or colon screening. No    Fall Risk Assessment, last 12 mths 7/15/2021   Able to walk? Yes   Fall in past 12 months? 1   Do you feel unsteady? 1   Are you worried about falling 1   Is TUG test greater than 12 seconds? 0   Number of falls in past 12 months 2   Fall with injury?  0                 3 most recent PHQ Screens 7/15/2021   Little interest or pleasure in doing things Several days   Feeling down, depressed, irritable, or hopeless Several days   Total Score PHQ 2 2       Health Maintenance Due   Topic Date Due    Shingrix Vaccine Age 50> (1 of 2) Never done

## 2021-07-15 NOTE — PROGRESS NOTES
Please inform person on PHI,All results including kidney and liver function, cholesterol, vitamin D and B12 levels are very reassuring. To continue on current medications. Thanks

## 2021-07-15 NOTE — LETTER
7/15/2021 10:03 AM    Mr. Santiago Hutchinson  [address] [facility] [address]    To whom so ever it concerns,    This is problem list for patient. 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 5XXA    Vitamin D deficiency E55.9    Pulmonary nodule seen on imaging study R91.1    Tachycardia R00.0    Osteoarthritis of knee, unilateral M17.10   [facility] discharge follow-up Z09    Contusion of right chest wall S20.211A    Tongue mass K14.8     Let me know if you have any questions.   thanks  Dc Grissom MD  [address] [place] [address]  Phone:[phone]  Fax: [phone]

## 2021-07-16 ENCOUNTER — TELEPHONE (OUTPATIENT)
Dept: FAMILY MEDICINE CLINIC | Age: 71
End: 2021-07-16

## 2021-07-16 DIAGNOSIS — R26.9 GAIT DISTURBANCE: ICD-10-CM

## 2021-07-16 DIAGNOSIS — R20.0 NUMBNESS AND TINGLING OF BOTH LEGS: ICD-10-CM

## 2021-07-16 DIAGNOSIS — R20.2 NUMBNESS AND TINGLING OF BOTH LEGS: ICD-10-CM

## 2021-07-16 DIAGNOSIS — R27.8 SENSORY ATAXIA: ICD-10-CM

## 2021-07-16 NOTE — TELEPHONE ENCOUNTER
----- Message from Virginia Presley sent at 7/16/2021  1:00 PM EDT -----  Regarding: Dr. Gaudencio Sommers Message/Vendor Calls    Caller's first and last name: Brandi Chen,       Reason for call: Pt needs assistance with DME supplies. Callback required yes/no and why: Yes to assist.       Best contact number(s): [phone]      Details to clarify the request: Pt's grand-daughter advised pt received scripts for walker and shower chair to take to the [facility] pt uses. [facility] advised they do not carry these supplies. Robb needs assistance on where pt can get supplies.        Virginia Presley

## 2021-07-16 NOTE — PROGRESS NOTES
Called patient's genevieve Hoffman as per PHI. Two patient identifiers verified. Discussed lab results. He Verbalized understanding. Letter placed in the mail.

## 2021-07-21 NOTE — TELEPHONE ENCOUNTER
Outbound call to patient's PHI. Name and  verified. Informed to call patient care coordination number on insurance card and that we will fax to [place] [employer] medical assistant service. He verbalized understanding.

## 2021-07-21 NOTE — TELEPHONE ENCOUNTER
I have already informed them to contact patient care coordination number on the insurance. Also I have printed out his prescriptions and we can fax to  [address] of medical assistant service, durable medical equipment department. Fax number is [phone].   Thanks

## 2021-08-03 ENCOUNTER — TELEPHONE (OUTPATIENT)
Dept: FAMILY MEDICINE CLINIC | Age: 71
End: 2021-08-03

## 2021-08-03 RX ORDER — FAMOTIDINE 20 MG/1
20 TABLET, FILM COATED ORAL 2 TIMES DAILY
Qty: 60 TABLET | Refills: 0 | Status: SHIPPED | OUTPATIENT
Start: 2021-08-03 | End: 2021-08-26

## 2021-08-03 NOTE — TELEPHONE ENCOUNTER
Palomo Napoles [phone]     Pt's granddaughter would like to order Sanotidine for Pt. Advise that it is not listed as one of his medication. Pt granddaughter  would like an order placed because he is out. Please call back and advise.

## 2021-08-03 NOTE — TELEPHONE ENCOUNTER
Patient is on omeprazole. Famotidine has been discontinued several years ago. Actually it was prescribed by emergency department. Will send refill, but it is not medication that he needs to continue for a longer. .  Let daughter know.

## 2021-08-03 NOTE — TELEPHONE ENCOUNTER
Outbound call to patient's PHI. Nima Wilson. Name and  verified. Advised her on provider's note about famotidine and informed that Rx has been sent to the pharmacy. Advised it is not medication that he needs to continue for a longer. She stated understanding.

## 2021-08-26 RX ORDER — FAMOTIDINE 20 MG/1
20 TABLET, FILM COATED ORAL 2 TIMES DAILY
Qty: 60 TABLET | Refills: 0 | Status: SHIPPED | OUTPATIENT
Start: 2021-08-26 | End: 2021-09-20

## 2021-09-20 RX ORDER — FAMOTIDINE 20 MG/1
20 TABLET, FILM COATED ORAL 2 TIMES DAILY
Qty: 60 TABLET | Refills: 0 | Status: SHIPPED | OUTPATIENT
Start: 2021-09-20 | End: 2021-10-13

## 2021-09-23 ENCOUNTER — TELEPHONE (OUTPATIENT)
Dept: FAMILY MEDICINE CLINIC | Age: 71
End: 2021-09-23

## 2021-09-23 NOTE — TELEPHONE ENCOUNTER
Marlene Graff calling to get Pt's health insurance information faxed over for medication         FHR:[phone]

## 2021-10-13 RX ORDER — FAMOTIDINE 20 MG/1
20 TABLET, FILM COATED ORAL 2 TIMES DAILY
Qty: 60 TABLET | Refills: 0 | Status: SHIPPED | OUTPATIENT
Start: 2021-10-13 | End: 2021-11-07

## 2021-10-19 ENCOUNTER — OFFICE VISIT (OUTPATIENT)
Dept: FAMILY MEDICINE CLINIC | Age: 71
End: 2021-10-19
Payer: MEDICAID

## 2021-10-19 VITALS
WEIGHT: 131.8 LBS | TEMPERATURE: 97.5 F | HEIGHT: 63 IN | DIASTOLIC BLOOD PRESSURE: 82 MMHG | SYSTOLIC BLOOD PRESSURE: 137 MMHG | HEART RATE: 73 BPM | OXYGEN SATURATION: 100 % | RESPIRATION RATE: 16 BRPM | BODY MASS INDEX: 23.35 KG/M2

## 2021-10-19 DIAGNOSIS — R26.9 GAIT DISTURBANCE: ICD-10-CM

## 2021-10-19 DIAGNOSIS — K21.9 GASTROESOPHAGEAL REFLUX DISEASE WITHOUT ESOPHAGITIS: ICD-10-CM

## 2021-10-19 DIAGNOSIS — Z23 ENCOUNTER FOR IMMUNIZATION: ICD-10-CM

## 2021-10-19 DIAGNOSIS — E53.8 VITAMIN B12 DEFICIENCY: ICD-10-CM

## 2021-10-19 DIAGNOSIS — I10 ESSENTIAL HYPERTENSION: ICD-10-CM

## 2021-10-19 DIAGNOSIS — Z91.09 ENVIRONMENTAL ALLERGIES: ICD-10-CM

## 2021-10-19 DIAGNOSIS — R20.2 NUMBNESS AND TINGLING OF BOTH LEGS: ICD-10-CM

## 2021-10-19 DIAGNOSIS — J45.20 MILD INTERMITTENT ASTHMA WITHOUT COMPLICATION: ICD-10-CM

## 2021-10-19 DIAGNOSIS — R20.0 NUMBNESS AND TINGLING OF BOTH LEGS: ICD-10-CM

## 2021-10-19 DIAGNOSIS — E78.5 HYPERLIPIDEMIA WITH TARGET LDL LESS THAN 130: Primary | ICD-10-CM

## 2021-10-19 DIAGNOSIS — R27.8 SENSORY ATAXIA: ICD-10-CM

## 2021-10-19 DIAGNOSIS — E55.9 VITAMIN D DEFICIENCY: ICD-10-CM

## 2021-10-19 DIAGNOSIS — Z71.89 ADVANCED CARE PLANNING/COUNSELING DISCUSSION: ICD-10-CM

## 2021-10-19 PROCEDURE — 90694 VACC AIIV4 NO PRSRV 0.5ML IM: CPT | Performed by: FAMILY MEDICINE

## 2021-10-19 PROCEDURE — 99214 OFFICE O/P EST MOD 30 MIN: CPT | Performed by: FAMILY MEDICINE

## 2021-10-19 RX ORDER — MONTELUKAST SODIUM 10 MG/1
TABLET ORAL
Qty: 30 TABLET | Refills: 5 | Status: SHIPPED | OUTPATIENT
Start: 2021-10-19

## 2021-10-19 RX ORDER — ALBUTEROL SULFATE 90 UG/1
1 AEROSOL, METERED RESPIRATORY (INHALATION)
Qty: 1 EACH | Refills: 1 | Status: SHIPPED | OUTPATIENT
Start: 2021-10-19

## 2021-10-19 RX ORDER — ATENOLOL 25 MG/1
TABLET ORAL
Qty: 90 TABLET | Refills: 1 | Status: SHIPPED | OUTPATIENT
Start: 2021-10-19

## 2021-10-19 RX ORDER — LANOLIN ALCOHOL/MO/W.PET/CERES
500 CREAM (GRAM) TOPICAL DAILY
Qty: 100 TABLET | Refills: 0 | Status: SHIPPED | OUTPATIENT
Start: 2021-10-19

## 2021-10-19 RX ORDER — CHOLECALCIFEROL (VITAMIN D3) 125 MCG
5000 CAPSULE ORAL DAILY
Qty: 90 CAPSULE | Refills: 1 | Status: SHIPPED | OUTPATIENT
Start: 2021-10-19

## 2021-10-19 RX ORDER — ATORVASTATIN CALCIUM 20 MG/1
TABLET, FILM COATED ORAL
Qty: 90 TABLET | Refills: 1 | Status: SHIPPED | OUTPATIENT
Start: 2021-10-19

## 2021-10-19 NOTE — PATIENT INSTRUCTIONS
DASH Diet: Care Instructions  Your Care Instructions     The DASH diet is an eating plan that can help lower your blood pressure. DASH stands for Dietary Approaches to Stop Hypertension. Hypertension is high blood pressure. The DASH diet focuses on eating foods that are high in calcium, potassium, and magnesium. These nutrients can lower blood pressure. The foods that are highest in these nutrients are fruits, vegetables, low-fat dairy products, nuts, seeds, and legumes. But taking calcium, potassium, and magnesium supplements instead of eating foods that are high in those nutrients does not have the same effect. The DASH diet also includes whole grains, fish, and poultry. The DASH diet is one of several lifestyle changes your doctor may recommend to lower your high blood pressure. Your doctor may also want you to decrease the amount of sodium in your diet. Lowering sodium while following the DASH diet can lower blood pressure even further than just the DASH diet alone. Follow-up care is a key part of your treatment and safety. Be sure to make and go to all appointments, and call your doctor if you are having problems. It's also a good idea to know your test results and keep a list of the medicines you take. How can you care for yourself at home? Following the DASH diet  · Eat 4 to 5 servings of fruit each day. A serving is 1 medium-sized piece of fruit, ½ cup chopped or canned fruit, 1/4 cup dried fruit, or 4 ounces (½ cup) of fruit juice. Choose fruit more often than fruit juice. · Eat 4 to 5 servings of vegetables each day. A serving is 1 cup of lettuce or raw leafy vegetables, ½ cup of chopped or cooked vegetables, or 4 ounces (½ cup) of vegetable juice. Choose vegetables more often than vegetable juice. · Get 2 to 3 servings of low-fat and fat-free dairy each day. A serving is 8 ounces of milk, 1 cup of yogurt, or 1 ½ ounces of cheese. · Eat 6 to 8 servings of grains each day.  A serving is 1 slice of bread, 1 ounce of dry cereal, or ½ cup of cooked rice, pasta, or cooked cereal. Try to choose whole-grain products as much as possible. · Limit lean meat, poultry, and fish to 2 servings each day. A serving is 3 ounces, about the size of a deck of cards. · Eat 4 to 5 servings of nuts, seeds, and legumes (cooked dried beans, lentils, and split peas) each week. A serving is 1/3 cup of nuts, 2 tablespoons of seeds, or ½ cup of cooked beans or peas. · Limit fats and oils to 2 to 3 servings each day. A serving is 1 teaspoon of vegetable oil or 2 tablespoons of salad dressing. · Limit sweets and added sugars to 5 servings or less a week. A serving is 1 tablespoon jelly or jam, ½ cup sorbet, or 1 cup of lemonade. · Eat less than 2,300 milligrams (mg) of sodium a day. If you limit your sodium to 1,500 mg a day, you can lower your blood pressure even more. · Be aware that all of these are the suggested number of servings for people who eat 1,800 to 2,000 calories a day. Your recommended number of servings may be different if you need more or fewer calories. Tips for success  · Start small. Do not try to make dramatic changes to your diet all at once. You might feel that you are missing out on your favorite foods and then be more likely to not follow the plan. Make small changes, and stick with them. Once those changes become habit, add a few more changes. · Try some of the following:  ? Make it a goal to eat a fruit or vegetable at every meal and at snacks. This will make it easy to get the recommended amount of fruits and vegetables each day. ? Try yogurt topped with fruit and nuts for a snack or healthy dessert. ? Add lettuce, tomato, cucumber, and onion to sandwiches. ? Combine a ready-made pizza crust with low-fat mozzarella cheese and lots of vegetable toppings. Try using tomatoes, squash, spinach, broccoli, carrots, cauliflower, and onions. ?  Have a variety of cut-up vegetables with a low-fat dip as an appetizer instead of chips and dip. ? Sprinkle sunflower seeds or chopped almonds over salads. Or try adding chopped walnuts or almonds to cooked vegetables. ? Try some vegetarian meals using beans and peas. Add garbanzo or kidney beans to salads. Make burritos and tacos with mashed knapp beans or black beans. Where can you learn more? Go to http://www.luo.com/  Enter H967 in the search box to learn more about \"DASH Diet: Care Instructions. \"  Current as of: April 29, 2021               Content Version: 13.0  © 5799-0117 "[employer]". Care instructions adapted under license by [employer] (which disclaims liability or warranty for this information). If you have questions about a medical condition or this instruction, always ask your healthcare professional. [address] any warranty or liability for your use of this information.

## 2021-10-19 NOTE — PROGRESS NOTES
Chief Complaint   Patient presents with    Hypertension     follow up         1. \"Have you been to the ER, urgent care clinic since your last visit? Hospitalized since your last visit? \" Yes When: 2 weeks ago Where: ER at [facility] Reason for visit: abdominal pain    2. \"Have you seen or consulted any other health care providers outside of the [address] [place] [address] since your last visit? \" No     3. For patients over 45: Has the patient had a colonoscopy?  No         3 most recent PHQ Screens 10/19/2021   Little interest or pleasure in doing things Not at all   Feeling down, depressed, irritable, or hopeless Not at all   Total Score PHQ 2 0       Health Maintenance Due   Topic Date Due    Shingrix Vaccine Age 50> (1 of 2) Never done    Flu Vaccine (1) 09/01/2021

## 2021-10-19 NOTE — PROGRESS NOTES
HISTORY OF PRESENT ILLNESS  Angelica Barker is a 73 y.o. male. Patient was seen today for follow-up on several medical concerns and issues and medication refills as he is moving out of the state next month. As per son, he was again seen in the ER 2 weeks ago for abdominal pain, bloating. He had episodes of vomiting and diarrhea with significant improvement in symptoms. In the emergency department work-up was reassuring and he was sent home without any medication. HPI  Cardiovascular Review  The patient has hypertension and hyperlipidemia.  He reports taking medications as instructed, no medication side effects noted, no TIA's, no chest pain on exertion, no dyspnea on exertion, no swelling of ankles, no orthostatic dizziness or lightheadedness, no orthopnea or paroxysmal nocturnal dyspnea, no palpitations, no intermittent claudication symptoms, no muscle aches or pain.  Diet and Lifestyle: not attempting to follow a low fat, low cholesterol diet, not attempting to follow a low sodium diet, sedentary, alcohol intake 3-4 beers/day.  Lab review: labs reviewed and discussed with patient.    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 He had MRI of head, Cervical spine and lumbar spine. 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 He was also referred for PT. He has negative RPR, normal homocystine and methylmalonic acid levels. He  had EMG and NCV which showed sensory neuropathy in both LE. His symptoms are stable on amitriptyline 25 mg at night. Is on Vitamin b12 and Vitamin D supplement.     Uses cane when goes out side. In home, walks without support.     He is also on chronic PPI and taking Singulair and nasal spray during allergy season ( fall and spring). He is using Rescue inhaler for wheezing and cough. Requesting refill for inhaler also.     Review of Systems Constitutional: Negative for chills, fever and malaise/fatigue. HENT: Negative for congestion, ear pain, sore throat and tinnitus. Eyes: Negative for blurred vision, double vision, pain and discharge. Respiratory: Negative for cough, shortness of breath and wheezing. Cardiovascular: Negative for chest pain, palpitations and leg swelling. Gastrointestinal: Negative for abdominal pain, blood in stool, constipation, diarrhea, nausea and vomiting. Genitourinary: Negative for dysuria, frequency, hematuria and urgency. Musculoskeletal: Negative for back pain, joint pain and myalgias. Skin: Negative for rash. Neurological: Negative for dizziness, tremors, seizures and headaches. Endo/Heme/Allergies: Negative for polydipsia. Does not bruise/bleed easily. Psychiatric/Behavioral: Negative for depression and substance abuse. The patient is not nervous/anxious. Physical Exam  Vitals and nursing note reviewed. Constitutional:       Appearance: He is well-developed. He is not diaphoretic. HENT:      Head: Normocephalic and atraumatic. Right Ear: External ear normal.      Nose: Nose normal.      Mouth/Throat:      Pharynx: No oropharyngeal exudate. Eyes:      General: No scleral icterus. Conjunctiva/sclera: Conjunctivae normal.      Pupils: Pupils are equal, round, and reactive to light. Neck:      Thyroid: No thyromegaly. Vascular: No JVD. Cardiovascular:      Rate and Rhythm: Normal rate and regular rhythm. Heart sounds: Normal heart sounds. No murmur heard. No friction rub. No gallop. Pulmonary:      Effort: Pulmonary effort is normal.      Breath sounds: Normal breath sounds. No wheezing. Abdominal:      General: Bowel sounds are normal. There is no distension. Palpations: Abdomen is soft. There is no mass. Musculoskeletal:         General: No tenderness. Normal range of motion. Cervical back: Normal range of motion and neck supple. Lymphadenopathy:      Cervical: No cervical adenopathy. Skin:     General: Skin is warm and dry. Findings: No rash. Neurological:      Mental Status: He is alert and oriented to person, place, and time. Cranial Nerves: No cranial nerve deficit. Coordination: Coordination normal.      Deep Tendon Reflexes: Reflexes are normal and symmetric. Reflex Scores:       Tricep reflexes are 2+ on the right side and 2+ on the left side. Bicep reflexes are 2+ on the right side and 2+ on the left side. Brachioradialis reflexes are 2+ on the right side and 2+ on the left side. Comments: Decreased sensations on both lower extremities. Exaggerated knee reflex on left side. He is using cane to support when he walks. Gait has been significantly improved consistently. ASSESSMENT and PLAN  Diagnoses and all orders for this visit:    1. Hyperlipidemia with target LDL less than 130  -     LIPID PANEL; Future  -     METABOLIC PANEL, COMPREHENSIVE; Future  -     atorvastatin (LIPITOR) 20 mg tablet; TAKE 1 TABLET BY MOUTH EVERY DAY    2. Essential hypertension  -     METABOLIC PANEL, COMPREHENSIVE; Future  -     atenoloL (TENORMIN) 25 mg tablet; TAKE 1 TABLET BY MOUTH EVERY DAY    3. Numbness and tingling of both legs  -     Shower Chair XX grace; To balance while taking showers  -     VITAMIN B12; Future    4. Sensory ataxia  -     Shower Chair XX grace; To balance while taking showers  -     Walker misc; Use when you walk    5. Gait disturbance  -     Shower Chair XX grace; To balance while taking showers  -     Walker misc; Use when you walk    6. Vitamin D deficiency  -     VITAMIN D, 25 HYDROXY; Future  -     cholecalciferol (VITAMIN D3) (5000 Units /125 mcg) capsule; Take 1 Capsule by mouth daily. 7. Vitamin B12 deficiency  -     VITAMIN B12; Future  -     cyanocobalamin (VITAMIN B12) 500 mcg tablet; Take 1 Tablet by mouth daily.     8. Gastroesophageal reflux disease without esophagitis  -     CBC WITH AUTOMATED DIFF; Future    9. Advanced care planning/counseling discussion    10. Encounter for immunization  -     INFLUENZA, HIGH DOSE SEASONAL    11. Mild intermittent asthma without complication  -     montelukast (SINGULAIR) 10 mg tablet; TAKE 1 TABLET BY MOUTH EVERY DAY  -     albuterol (PROVENTIL HFA, VENTOLIN HFA, PROAIR HFA) 90 mcg/actuation inhaler; Take 1 Puff by inhalation every six (6) hours as needed for Wheezing. 12. Environmental allergies  -     montelukast (SINGULAIR) 10 mg tablet; TAKE 1 TABLET BY MOUTH EVERY DAY      Discussed lifestyle issues and health guidance given  Patient was given an after visit summary which includes diagnoses, vital signs, current medications, instructions and references & authorized prescriptions . Results of labs will be conveyed to patient, once available. Pt verbalized instructions I provided and expressed understanding of discussion that was held today. Follow-up and Dispositions    · Return if symptoms worsen or fail to improve. Please note that this dictation was completed with [employer], the computer voice recognition software. Quite often unanticipated grammatical, syntax, homophones, and other interpretive errors are inadvertently transcribed by the computer software. Please disregard these errors. Please excuse any errors that have escaped final proofreading. Thank you.

## 2021-10-20 LAB
25(OH)D3 SERPL-MCNC: 42.8 NG/ML (ref 30–100)
ALBUMIN SERPL-MCNC: 4 G/DL (ref 3.5–5)
ALBUMIN/GLOB SERPL: 1.1 {RATIO} (ref 1.1–2.2)
ALP SERPL-CCNC: 102 U/L (ref 45–117)
ALT SERPL-CCNC: 41 U/L (ref 12–78)
ANION GAP SERPL CALC-SCNC: 1 MMOL/L (ref 5–15)
AST SERPL-CCNC: 22 U/L (ref 15–37)
BASOPHILS # BLD: 0 K/UL (ref 0–0.1)
BASOPHILS NFR BLD: 1 % (ref 0–1)
BILIRUB SERPL-MCNC: 0.6 MG/DL (ref 0.2–1)
BUN SERPL-MCNC: 18 MG/DL (ref 6–20)
BUN/CREAT SERPL: 17 (ref 12–20)
CALCIUM SERPL-MCNC: 9.3 MG/DL (ref 8.5–10.1)
CHLORIDE SERPL-SCNC: 108 MMOL/L (ref 97–108)
CHOLEST SERPL-MCNC: 129 MG/DL
CO2 SERPL-SCNC: 27 MMOL/L (ref 21–32)
CREAT SERPL-MCNC: 1.07 MG/DL (ref 0.7–1.3)
DIFFERENTIAL METHOD BLD: NORMAL
EOSINOPHIL # BLD: 0.3 K/UL (ref 0–0.4)
EOSINOPHIL NFR BLD: 4 % (ref 0–7)
ERYTHROCYTE [DISTWIDTH] IN BLOOD BY AUTOMATED COUNT: 13.9 % (ref 11.5–14.5)
GLOBULIN SER CALC-MCNC: 3.8 G/DL (ref 2–4)
GLUCOSE SERPL-MCNC: 95 MG/DL (ref 65–100)
HCT VFR BLD AUTO: 43.7 % (ref 36.6–50.3)
HDLC SERPL-MCNC: 31 MG/DL
HDLC SERPL: 4.2 {RATIO} (ref 0–5)
HGB BLD-MCNC: 13.8 G/DL (ref 12.1–17)
IMM GRANULOCYTES # BLD AUTO: 0 K/UL (ref 0–0.04)
IMM GRANULOCYTES NFR BLD AUTO: 0 % (ref 0–0.5)
LDLC SERPL CALC-MCNC: 52.8 MG/DL (ref 0–100)
LYMPHOCYTES # BLD: 1.8 K/UL (ref 0.8–3.5)
LYMPHOCYTES NFR BLD: 27 % (ref 12–49)
MCH RBC QN AUTO: 26.4 PG (ref 26–34)
MCHC RBC AUTO-ENTMCNC: 31.6 G/DL (ref 30–36.5)
MCV RBC AUTO: 83.6 FL (ref 80–99)
MONOCYTES # BLD: 0.5 K/UL (ref 0–1)
MONOCYTES NFR BLD: 8 % (ref 5–13)
NEUTS SEG # BLD: 4 K/UL (ref 1.8–8)
NEUTS SEG NFR BLD: 60 % (ref 32–75)
NRBC # BLD: 0 K/UL (ref 0–0.01)
NRBC BLD-RTO: 0 PER 100 WBC
PLATELET # BLD AUTO: 258 K/UL (ref 150–400)
PMV BLD AUTO: 11.7 FL (ref 8.9–12.9)
POTASSIUM SERPL-SCNC: 4.6 MMOL/L (ref 3.5–5.1)
PROT SERPL-MCNC: 7.8 G/DL (ref 6.4–8.2)
RBC # BLD AUTO: 5.23 M/UL (ref 4.1–5.7)
SODIUM SERPL-SCNC: 136 MMOL/L (ref 136–145)
TRIGL SERPL-MCNC: 226 MG/DL (ref ?–150)
VIT B12 SERPL-MCNC: 593 PG/ML (ref 193–986)
VLDLC SERPL CALC-MCNC: 45.2 MG/DL
WBC # BLD AUTO: 6.7 K/UL (ref 4.1–11.1)

## 2021-10-22 NOTE — PROGRESS NOTES
Called patient. Two patient identifiers verified. Discussed lab results per provider's note. He Verbalized understanding. Letter placed in the mail.

## 2021-10-22 NOTE — PROGRESS NOTES
Please inform [facility] and also mail results as patient is moving out of state. So he can carry all his results to his new PCP. All results including blood count,Kidney and liver function, cholesterol results, vitamin D and vitamin B12 are very reassuring and in acceptable range. To continue with current medications. To establish care with new primary care provider as soon as possible with [place] as we cannot continue medication refills for more than 3 months. Thanks

## 2021-11-07 RX ORDER — FAMOTIDINE 20 MG/1
TABLET, FILM COATED ORAL
Qty: 60 TABLET | Refills: 0 | Status: SHIPPED | OUTPATIENT
Start: 2021-11-07

## 2022-03-18 PROBLEM — Z09 HOSPITAL DISCHARGE FOLLOW-UP: Status: ACTIVE | Noted: 2017-05-31

## 2022-03-18 PROBLEM — S20.211A CONTUSION OF RIGHT CHEST WALL: Status: ACTIVE | Noted: 2017-05-31

## 2022-03-20 PROBLEM — K14.8 TONGUE MASS: Status: ACTIVE | Noted: 2017-09-11

## 2023-05-18 RX ORDER — ATENOLOL 25 MG/1
1 TABLET ORAL DAILY
COMMUNITY
Start: 2021-10-19

## 2023-05-18 RX ORDER — AMITRIPTYLINE HYDROCHLORIDE 25 MG/1
25 TABLET, FILM COATED ORAL
COMMUNITY
Start: 2021-03-15

## 2023-05-18 RX ORDER — FAMOTIDINE 20 MG/1
1 TABLET, FILM COATED ORAL 2 TIMES DAILY
COMMUNITY
Start: 2021-11-07

## 2023-05-18 RX ORDER — ALBUTEROL SULFATE 90 UG/1
1 AEROSOL, METERED RESPIRATORY (INHALATION) EVERY 6 HOURS PRN
COMMUNITY
Start: 2021-10-19

## 2023-05-18 RX ORDER — MONTELUKAST SODIUM 10 MG/1
1 TABLET ORAL DAILY
COMMUNITY
Start: 2021-10-19

## 2023-05-18 RX ORDER — ATORVASTATIN CALCIUM 20 MG/1
1 TABLET, FILM COATED ORAL DAILY
COMMUNITY
Start: 2021-10-19

## 2024-05-15 NOTE — PROGRESS NOTES
Chief Complaint   Patient presents with    Cholesterol Problem     Follow up, Fasting    Hypertension     1. Have you been to the ER, urgent care clinic since your last visit? Hospitalized since your last visit? No    2. Have you seen or consulted any other health care providers outside of the [address] [place] [address] since your last visit? Include any pap smears or colon screening.  No 46.7

## 2025-02-05 NOTE — TELEPHONE ENCOUNTER
Pt's caregiver made aware of message below, understanding voiced
Seth ALBRIGHT  [phone]    Patient's son, Maliha Le, is calling to check the status of the pre-auth for diclofenac. He states that it has been 10 days. Please advise.
Waiting for a call back from Mr Maliha Le to inform medication was denied and message forward to doctor. Spoke with Mr. Maliha Le, informed Diclofenac was denied. Wants to know what other medication can be used.
(4) no impairment

## (undated) DEVICE — FORCEPS BX L240CM JAW DIA2.8MM L CAP W/ NDL MIC MESH TOOTH

## (undated) DEVICE — TUBING HYDR IRR --